# Patient Record
Sex: FEMALE | Race: BLACK OR AFRICAN AMERICAN | NOT HISPANIC OR LATINO | Employment: PART TIME | URBAN - METROPOLITAN AREA
[De-identification: names, ages, dates, MRNs, and addresses within clinical notes are randomized per-mention and may not be internally consistent; named-entity substitution may affect disease eponyms.]

---

## 2017-11-13 ENCOUNTER — CONVERSION ENCOUNTER (OUTPATIENT)
Dept: MAMMOGRAPHY | Facility: HOSPITAL | Age: 62
End: 2017-11-13

## 2018-01-29 ENCOUNTER — OFFICE VISIT CONVERTED (OUTPATIENT)
Dept: ORTHOPEDIC SURGERY | Facility: CLINIC | Age: 63
End: 2018-01-29
Attending: PHYSICIAN ASSISTANT

## 2018-02-02 ENCOUNTER — OFFICE VISIT CONVERTED (OUTPATIENT)
Dept: FAMILY MEDICINE CLINIC | Facility: CLINIC | Age: 63
End: 2018-02-02
Attending: NURSE PRACTITIONER

## 2018-02-02 ENCOUNTER — CONVERSION ENCOUNTER (OUTPATIENT)
Dept: FAMILY MEDICINE CLINIC | Facility: CLINIC | Age: 63
End: 2018-02-02

## 2018-02-28 ENCOUNTER — OFFICE VISIT CONVERTED (OUTPATIENT)
Dept: ORTHOPEDIC SURGERY | Facility: CLINIC | Age: 63
End: 2018-02-28
Attending: PHYSICIAN ASSISTANT

## 2018-03-28 ENCOUNTER — OFFICE VISIT CONVERTED (OUTPATIENT)
Dept: ORTHOPEDIC SURGERY | Facility: CLINIC | Age: 63
End: 2018-03-28
Attending: PHYSICIAN ASSISTANT

## 2018-04-25 ENCOUNTER — OFFICE VISIT CONVERTED (OUTPATIENT)
Dept: ORTHOPEDIC SURGERY | Facility: CLINIC | Age: 63
End: 2018-04-25
Attending: PHYSICIAN ASSISTANT

## 2018-05-16 ENCOUNTER — OFFICE VISIT CONVERTED (OUTPATIENT)
Dept: ORTHOPEDIC SURGERY | Facility: CLINIC | Age: 63
End: 2018-05-16
Attending: ORTHOPAEDIC SURGERY

## 2018-06-25 ENCOUNTER — OFFICE VISIT CONVERTED (OUTPATIENT)
Dept: FAMILY MEDICINE CLINIC | Facility: CLINIC | Age: 63
End: 2018-06-25
Attending: NURSE PRACTITIONER

## 2018-08-29 ENCOUNTER — OFFICE VISIT CONVERTED (OUTPATIENT)
Dept: FAMILY MEDICINE CLINIC | Facility: CLINIC | Age: 63
End: 2018-08-29
Attending: NURSE PRACTITIONER

## 2018-08-29 ENCOUNTER — CONVERSION ENCOUNTER (OUTPATIENT)
Dept: FAMILY MEDICINE CLINIC | Facility: CLINIC | Age: 63
End: 2018-08-29

## 2018-09-12 ENCOUNTER — OFFICE VISIT CONVERTED (OUTPATIENT)
Dept: ORTHOPEDIC SURGERY | Facility: CLINIC | Age: 63
End: 2018-09-12
Attending: PHYSICIAN ASSISTANT

## 2018-11-15 ENCOUNTER — CONVERSION ENCOUNTER (OUTPATIENT)
Dept: MAMMOGRAPHY | Facility: HOSPITAL | Age: 63
End: 2018-11-15

## 2018-12-07 ENCOUNTER — CONVERSION ENCOUNTER (OUTPATIENT)
Dept: MAMMOGRAPHY | Facility: HOSPITAL | Age: 63
End: 2018-12-07

## 2019-01-15 ENCOUNTER — HOSPITAL ENCOUNTER (OUTPATIENT)
Dept: GENERAL RADIOLOGY | Facility: HOSPITAL | Age: 64
Discharge: HOME | End: 2019-01-15
Attending: NURSE PRACTITIONER

## 2019-01-15 ENCOUNTER — OFFICE VISIT CONVERTED (OUTPATIENT)
Dept: FAMILY MEDICINE CLINIC | Facility: CLINIC | Age: 64
End: 2019-01-15
Attending: NURSE PRACTITIONER

## 2019-03-12 ENCOUNTER — HOSPITAL ENCOUNTER (OUTPATIENT)
Dept: PHYSICAL THERAPY | Facility: CLINIC | Age: 64
Setting detail: RECURRING SERIES
Discharge: STILL ADMITTED OR ADMINISTRATIVE | End: 2019-07-05
Attending: NURSE PRACTITIONER

## 2019-03-25 ENCOUNTER — CONVERSION ENCOUNTER (OUTPATIENT)
Dept: ORTHOPEDIC SURGERY | Facility: CLINIC | Age: 64
End: 2019-03-25

## 2019-03-25 ENCOUNTER — OFFICE VISIT CONVERTED (OUTPATIENT)
Dept: ORTHOPEDIC SURGERY | Facility: CLINIC | Age: 64
End: 2019-03-25
Attending: ORTHOPAEDIC SURGERY

## 2019-03-27 ENCOUNTER — OFFICE VISIT CONVERTED (OUTPATIENT)
Dept: FAMILY MEDICINE CLINIC | Facility: CLINIC | Age: 64
End: 2019-03-27
Attending: NURSE PRACTITIONER

## 2019-05-22 ENCOUNTER — HOSPITAL ENCOUNTER (OUTPATIENT)
Dept: ULTRASOUND IMAGING | Facility: HOSPITAL | Age: 64
Discharge: HOME | End: 2019-05-22
Attending: NURSE PRACTITIONER

## 2019-06-04 ENCOUNTER — OFFICE VISIT CONVERTED (OUTPATIENT)
Dept: FAMILY MEDICINE CLINIC | Facility: CLINIC | Age: 64
End: 2019-06-04
Attending: NURSE PRACTITIONER

## 2019-06-04 ENCOUNTER — CONVERSION ENCOUNTER (OUTPATIENT)
Dept: FAMILY MEDICINE CLINIC | Facility: CLINIC | Age: 64
End: 2019-06-04

## 2019-07-03 ENCOUNTER — HOSPITAL ENCOUNTER (OUTPATIENT)
Dept: LAB | Facility: HOSPITAL | Age: 64
Discharge: HOME | End: 2019-07-03
Attending: NURSE PRACTITIONER

## 2019-07-03 ENCOUNTER — OFFICE VISIT CONVERTED (OUTPATIENT)
Dept: FAMILY MEDICINE CLINIC | Facility: CLINIC | Age: 64
End: 2019-07-03
Attending: NURSE PRACTITIONER

## 2019-07-03 LAB
CONV RHEUMATOID FACTOR IGM: 91.9 [IU]/ML (ref 0–14)
ERYTHROCYTE [SEDIMENTATION RATE] IN BLOOD: 14 MM/H (ref 0–30)
URATE SERPL-MCNC: 5.4 MG/DL (ref 2.5–7.5)

## 2019-07-05 LAB — CONV ANTI NUCLEAR ANTIBODY WITH REFLEX: POSITIVE

## 2019-07-08 LAB
CENTROMERE B AB SER-ACNC: <0.2 AI (ref 0–0.9)
CHROMATIN AB: 0.2 AI (ref 0–0.9)
CONV ANTI DOUBLE STRAND DNA  (REFLEX): 1 IU/ML (ref 0–9)
CONV SS-A ANTIBODY (REFLEX): >8 AI (ref 0–0.9)
CONV SS-B ANTIBODY (REFLEX): 0.4 AI (ref 0–0.9)
ENA JO1 AB SER IA-ACNC: <0.2 AI (ref 0–0.9)
ENA RNP AB SER-ACNC: <0.2 AI (ref 0–0.9)
ENA SCL70 AB SER QL IA: <0.2 AI (ref 0–0.9)
ENA SM AB SER-ACNC: <0.2 AI (ref 0–0.9)
Lab: ABNORMAL

## 2019-07-10 ENCOUNTER — HOSPITAL ENCOUNTER (OUTPATIENT)
Dept: PHYSICAL THERAPY | Facility: CLINIC | Age: 64
Setting detail: RECURRING SERIES
Discharge: HOME | End: 2019-07-19
Attending: NURSE PRACTITIONER

## 2019-07-12 ENCOUNTER — OFFICE VISIT CONVERTED (OUTPATIENT)
Dept: FAMILY MEDICINE CLINIC | Facility: CLINIC | Age: 64
End: 2019-07-12
Attending: NURSE PRACTITIONER

## 2019-11-21 ENCOUNTER — HOSPITAL ENCOUNTER (OUTPATIENT)
Dept: MAMMOGRAPHY | Facility: HOSPITAL | Age: 64
Discharge: HOME | End: 2019-11-21
Attending: NURSE PRACTITIONER

## 2019-12-04 ENCOUNTER — CONVERSION ENCOUNTER (OUTPATIENT)
Dept: FAMILY MEDICINE CLINIC | Facility: CLINIC | Age: 64
End: 2019-12-04

## 2019-12-04 ENCOUNTER — OFFICE VISIT CONVERTED (OUTPATIENT)
Dept: FAMILY MEDICINE CLINIC | Facility: CLINIC | Age: 64
End: 2019-12-04
Attending: NURSE PRACTITIONER

## 2020-02-14 ENCOUNTER — HOSPITAL ENCOUNTER (OUTPATIENT)
Dept: LAB | Facility: HOSPITAL | Age: 65
Discharge: HOME | End: 2020-02-14
Attending: NURSE PRACTITIONER

## 2020-02-14 LAB
ALBUMIN SERPL-MCNC: 4.1 G/DL (ref 3.5–5)
ALBUMIN/GLOB SERPL: 1.2 {RATIO} (ref 1.4–2.6)
ALP SERPL-CCNC: 67 U/L (ref 43–160)
ALT SERPL-CCNC: 22 U/L (ref 10–40)
ANION GAP SERPL CALC-SCNC: 18 MMOL/L (ref 8–19)
AST SERPL-CCNC: 17 U/L (ref 15–50)
BASOPHILS # BLD AUTO: 0.02 10*3/UL (ref 0–0.2)
BASOPHILS NFR BLD AUTO: 0.4 % (ref 0–3)
BILIRUB SERPL-MCNC: 0.26 MG/DL (ref 0.2–1.3)
BUN SERPL-MCNC: 12 MG/DL (ref 5–25)
BUN/CREAT SERPL: 19 {RATIO} (ref 6–20)
CALCIUM SERPL-MCNC: 9.3 MG/DL (ref 8.7–10.4)
CHLORIDE SERPL-SCNC: 105 MMOL/L (ref 99–111)
CHOLEST SERPL-MCNC: 147 MG/DL (ref 107–200)
CHOLEST/HDLC SERPL: 2.6 {RATIO} (ref 3–6)
CONV ABS IMM GRAN: 0.01 10*3/UL (ref 0–0.2)
CONV CO2: 24 MMOL/L (ref 22–32)
CONV IMMATURE GRAN: 0.2 % (ref 0–1.8)
CONV TOTAL PROTEIN: 7.4 G/DL (ref 6.3–8.2)
CREAT UR-MCNC: 0.63 MG/DL (ref 0.5–0.9)
DEPRECATED RDW RBC AUTO: 37.7 FL (ref 36.4–46.3)
EOSINOPHIL # BLD AUTO: 0.15 10*3/UL (ref 0–0.7)
EOSINOPHIL # BLD AUTO: 3.2 % (ref 0–7)
ERYTHROCYTE [DISTWIDTH] IN BLOOD BY AUTOMATED COUNT: 13.9 % (ref 11.7–14.4)
GFR SERPLBLD BASED ON 1.73 SQ M-ARVRAT: >60 ML/MIN/{1.73_M2}
GLOBULIN UR ELPH-MCNC: 3.3 G/DL (ref 2–3.5)
GLUCOSE SERPL-MCNC: 83 MG/DL (ref 65–99)
HCT VFR BLD AUTO: 37.2 % (ref 37–47)
HDLC SERPL-MCNC: 56 MG/DL (ref 40–60)
HGB BLD-MCNC: 10.9 G/DL (ref 12–16)
LDLC SERPL CALC-MCNC: 81 MG/DL (ref 70–100)
LYMPHOCYTES # BLD AUTO: 2.09 10*3/UL (ref 1–5)
LYMPHOCYTES NFR BLD AUTO: 44.8 % (ref 20–45)
MCH RBC QN AUTO: 22.4 PG (ref 27–31)
MCHC RBC AUTO-ENTMCNC: 29.3 G/DL (ref 33–37)
MCV RBC AUTO: 76.5 FL (ref 81–99)
MONOCYTES # BLD AUTO: 0.45 10*3/UL (ref 0.2–1.2)
MONOCYTES NFR BLD AUTO: 9.6 % (ref 3–10)
NEUTROPHILS # BLD AUTO: 1.95 10*3/UL (ref 2–8)
NEUTROPHILS NFR BLD AUTO: 41.8 % (ref 30–85)
NRBC CBCN: 0 % (ref 0–0.7)
OSMOLALITY SERPL CALC.SUM OF ELEC: 295 MOSM/KG (ref 273–304)
PLATELET # BLD AUTO: 368 10*3/UL (ref 130–400)
PMV BLD AUTO: 10.9 FL (ref 9.4–12.3)
POTASSIUM SERPL-SCNC: 3.9 MMOL/L (ref 3.5–5.3)
RBC # BLD AUTO: 4.86 10*6/UL (ref 4.2–5.4)
SODIUM SERPL-SCNC: 143 MMOL/L (ref 135–147)
TRIGL SERPL-MCNC: 49 MG/DL (ref 40–150)
TSH SERPL-ACNC: 0.97 M[IU]/L (ref 0.27–4.2)
VLDLC SERPL-MCNC: 10 MG/DL (ref 5–37)
WBC # BLD AUTO: 4.67 10*3/UL (ref 4.8–10.8)

## 2020-03-13 ENCOUNTER — HOSPITAL ENCOUNTER (OUTPATIENT)
Dept: LAB | Facility: HOSPITAL | Age: 65
Discharge: HOME | End: 2020-03-13
Attending: NURSE PRACTITIONER

## 2020-03-13 LAB
BASOPHILS # BLD AUTO: 0.02 10*3/UL (ref 0–0.2)
BASOPHILS NFR BLD AUTO: 0.4 % (ref 0–3)
CONV ABS IMM GRAN: 0.01 10*3/UL (ref 0–0.2)
CONV IMMATURE GRAN: 0.2 % (ref 0–1.8)
DEPRECATED RDW RBC AUTO: 39.9 FL (ref 36.4–46.3)
EOSINOPHIL # BLD AUTO: 0.26 10*3/UL (ref 0–0.7)
EOSINOPHIL # BLD AUTO: 4.8 % (ref 0–7)
ERYTHROCYTE [DISTWIDTH] IN BLOOD BY AUTOMATED COUNT: 14.5 % (ref 11.7–14.4)
HCT VFR BLD AUTO: 37.8 % (ref 37–47)
HGB BLD-MCNC: 10.9 G/DL (ref 12–16)
LYMPHOCYTES # BLD AUTO: 2.22 10*3/UL (ref 1–5)
LYMPHOCYTES NFR BLD AUTO: 40.6 % (ref 20–45)
MCH RBC QN AUTO: 22.3 PG (ref 27–31)
MCHC RBC AUTO-ENTMCNC: 28.8 G/DL (ref 33–37)
MCV RBC AUTO: 77.3 FL (ref 81–99)
MONOCYTES # BLD AUTO: 0.43 10*3/UL (ref 0.2–1.2)
MONOCYTES NFR BLD AUTO: 7.9 % (ref 3–10)
NEUTROPHILS # BLD AUTO: 2.53 10*3/UL (ref 2–8)
NEUTROPHILS NFR BLD AUTO: 46.1 % (ref 30–85)
NRBC CBCN: 0 % (ref 0–0.7)
PLATELET # BLD AUTO: 319 10*3/UL (ref 130–400)
PMV BLD AUTO: 11 FL (ref 9.4–12.3)
RBC # BLD AUTO: 4.89 10*6/UL (ref 4.2–5.4)
WBC # BLD AUTO: 5.47 10*3/UL (ref 4.8–10.8)

## 2020-03-20 ENCOUNTER — OFFICE VISIT CONVERTED (OUTPATIENT)
Dept: FAMILY MEDICINE CLINIC | Facility: CLINIC | Age: 65
End: 2020-03-20
Attending: NURSE PRACTITIONER

## 2020-03-20 ENCOUNTER — CONVERSION ENCOUNTER (OUTPATIENT)
Dept: FAMILY MEDICINE CLINIC | Facility: CLINIC | Age: 65
End: 2020-03-20

## 2020-04-08 ENCOUNTER — OFFICE VISIT CONVERTED (OUTPATIENT)
Dept: ORTHOPEDIC SURGERY | Facility: CLINIC | Age: 65
End: 2020-04-08
Attending: ORTHOPAEDIC SURGERY

## 2020-05-22 ENCOUNTER — CONVERSION ENCOUNTER (OUTPATIENT)
Dept: ORTHOPEDIC SURGERY | Facility: CLINIC | Age: 65
End: 2020-05-22

## 2020-05-22 ENCOUNTER — TELEMEDICINE CONVERTED (OUTPATIENT)
Dept: ORTHOPEDIC SURGERY | Facility: CLINIC | Age: 65
End: 2020-05-22
Attending: PHYSICIAN ASSISTANT

## 2020-06-05 ENCOUNTER — OFFICE VISIT CONVERTED (OUTPATIENT)
Dept: FAMILY MEDICINE CLINIC | Facility: CLINIC | Age: 65
End: 2020-06-05
Attending: NURSE PRACTITIONER

## 2020-06-05 ENCOUNTER — HOSPITAL ENCOUNTER (OUTPATIENT)
Dept: GENERAL RADIOLOGY | Facility: HOSPITAL | Age: 65
Discharge: HOME | End: 2020-06-05
Attending: NURSE PRACTITIONER

## 2020-06-15 ENCOUNTER — OFFICE VISIT CONVERTED (OUTPATIENT)
Dept: FAMILY MEDICINE CLINIC | Facility: CLINIC | Age: 65
End: 2020-06-15
Attending: NURSE PRACTITIONER

## 2020-06-16 ENCOUNTER — HOSPITAL ENCOUNTER (OUTPATIENT)
Dept: MRI IMAGING | Facility: HOSPITAL | Age: 65
Discharge: HOME | End: 2020-06-16
Attending: NURSE PRACTITIONER

## 2020-06-29 ENCOUNTER — OFFICE VISIT CONVERTED (OUTPATIENT)
Dept: ORTHOPEDIC SURGERY | Facility: CLINIC | Age: 65
End: 2020-06-29
Attending: ORTHOPAEDIC SURGERY

## 2020-09-21 ENCOUNTER — OFFICE VISIT CONVERTED (OUTPATIENT)
Dept: ORTHOPEDIC SURGERY | Facility: CLINIC | Age: 65
End: 2020-09-21
Attending: ORTHOPAEDIC SURGERY

## 2020-11-04 ENCOUNTER — OFFICE VISIT CONVERTED (OUTPATIENT)
Dept: FAMILY MEDICINE CLINIC | Facility: CLINIC | Age: 65
End: 2020-11-04
Attending: STUDENT IN AN ORGANIZED HEALTH CARE EDUCATION/TRAINING PROGRAM

## 2020-11-04 ENCOUNTER — OFFICE VISIT CONVERTED (OUTPATIENT)
Dept: ORTHOPEDIC SURGERY | Facility: CLINIC | Age: 65
End: 2020-11-04
Attending: PHYSICIAN ASSISTANT

## 2020-12-03 ENCOUNTER — HOSPITAL ENCOUNTER (OUTPATIENT)
Dept: LAB | Facility: HOSPITAL | Age: 65
Discharge: HOME | End: 2020-12-03
Attending: NURSE PRACTITIONER

## 2020-12-03 ENCOUNTER — CONVERSION ENCOUNTER (OUTPATIENT)
Dept: FAMILY MEDICINE CLINIC | Facility: CLINIC | Age: 65
End: 2020-12-03

## 2020-12-03 ENCOUNTER — OFFICE VISIT CONVERTED (OUTPATIENT)
Dept: FAMILY MEDICINE CLINIC | Facility: CLINIC | Age: 65
End: 2020-12-03
Attending: NURSE PRACTITIONER

## 2020-12-03 LAB
ALBUMIN SERPL-MCNC: 3.9 G/DL (ref 3.5–5)
ALBUMIN/GLOB SERPL: 1.1 {RATIO} (ref 1.4–2.6)
ALP SERPL-CCNC: 72 U/L (ref 43–160)
ALT SERPL-CCNC: 15 U/L (ref 10–40)
ANION GAP SERPL CALC-SCNC: 13 MMOL/L (ref 8–19)
AST SERPL-CCNC: 19 U/L (ref 15–50)
BILIRUB SERPL-MCNC: 0.22 MG/DL (ref 0.2–1.3)
BUN SERPL-MCNC: 12 MG/DL (ref 5–25)
BUN/CREAT SERPL: 17 {RATIO} (ref 6–20)
CALCIUM SERPL-MCNC: 9.5 MG/DL (ref 8.7–10.4)
CHLORIDE SERPL-SCNC: 104 MMOL/L (ref 99–111)
CHOLEST SERPL-MCNC: 162 MG/DL (ref 107–200)
CHOLEST/HDLC SERPL: 2.6 {RATIO} (ref 3–6)
CONV CO2: 26 MMOL/L (ref 22–32)
CONV TOTAL PROTEIN: 7.5 G/DL (ref 6.3–8.2)
CREAT UR-MCNC: 0.71 MG/DL (ref 0.5–0.9)
GFR SERPLBLD BASED ON 1.73 SQ M-ARVRAT: >60 ML/MIN/{1.73_M2}
GLOBULIN UR ELPH-MCNC: 3.6 G/DL (ref 2–3.5)
GLUCOSE SERPL-MCNC: 76 MG/DL (ref 65–99)
HDLC SERPL-MCNC: 63 MG/DL (ref 40–60)
LDLC SERPL CALC-MCNC: 90 MG/DL (ref 70–100)
OSMOLALITY SERPL CALC.SUM OF ELEC: 287 MOSM/KG (ref 273–304)
POTASSIUM SERPL-SCNC: 3.9 MMOL/L (ref 3.5–5.3)
SODIUM SERPL-SCNC: 139 MMOL/L (ref 135–147)
TRIGL SERPL-MCNC: 47 MG/DL (ref 40–150)
VLDLC SERPL-MCNC: 9 MG/DL (ref 5–37)

## 2020-12-15 ENCOUNTER — HOSPITAL ENCOUNTER (OUTPATIENT)
Dept: URGENT CARE | Facility: CLINIC | Age: 65
Discharge: HOME | End: 2020-12-15
Attending: EMERGENCY MEDICINE

## 2020-12-15 ENCOUNTER — HOSPITAL ENCOUNTER (OUTPATIENT)
Dept: MAMMOGRAPHY | Facility: HOSPITAL | Age: 65
Discharge: HOME | End: 2020-12-15
Attending: NURSE PRACTITIONER

## 2021-01-02 ENCOUNTER — HOSPITAL ENCOUNTER (OUTPATIENT)
Dept: PREADMISSION TESTING | Facility: HOSPITAL | Age: 66
Discharge: HOME | End: 2021-01-02
Attending: ORTHOPAEDIC SURGERY

## 2021-01-04 LAB — SARS-COV-2 RNA SPEC QL NAA+PROBE: NOT DETECTED

## 2021-01-07 ENCOUNTER — HOSPITAL ENCOUNTER (OUTPATIENT)
Dept: PERIOP | Facility: HOSPITAL | Age: 66
Setting detail: HOSPITAL OUTPATIENT SURGERY
Discharge: HOME | End: 2021-01-07
Attending: ORTHOPAEDIC SURGERY

## 2021-01-20 ENCOUNTER — OFFICE VISIT CONVERTED (OUTPATIENT)
Dept: ORTHOPEDIC SURGERY | Facility: CLINIC | Age: 66
End: 2021-01-20
Attending: PHYSICIAN ASSISTANT

## 2021-02-22 ENCOUNTER — OFFICE VISIT CONVERTED (OUTPATIENT)
Dept: ORTHOPEDIC SURGERY | Facility: CLINIC | Age: 66
End: 2021-02-22
Attending: PHYSICIAN ASSISTANT

## 2021-03-16 ENCOUNTER — HOSPITAL ENCOUNTER (OUTPATIENT)
Dept: VACCINE CLINIC | Facility: HOSPITAL | Age: 66
Discharge: HOME | End: 2021-03-16
Attending: INTERNAL MEDICINE

## 2021-03-29 ENCOUNTER — OFFICE VISIT CONVERTED (OUTPATIENT)
Dept: ORTHOPEDIC SURGERY | Facility: CLINIC | Age: 66
End: 2021-03-29

## 2021-04-07 ENCOUNTER — HOSPITAL ENCOUNTER (OUTPATIENT)
Dept: VACCINE CLINIC | Facility: HOSPITAL | Age: 66
Discharge: HOME | End: 2021-04-07
Attending: INTERNAL MEDICINE

## 2021-05-10 NOTE — H&P
History and Physical      Patient Name: Meliza Art   Patient ID: [id number]   Sex: Female   YOB: 1955    Primary Care Provider: Bishnu GRIJALVA    Visit Date: June 29, 2020    Provider: Oc Wang MD   Location: [facility]   Location Address: [address] [place] [address]  [id number]   Location Phone: [phone]          Chief Complaint  · Left Shoulder Pain       History Of Present Illness  Meliza Art is a 64 year old /Black female who presents today to [facility]. Patient is following-up for left shoulder injury sustained one month ago after she fell. She states pain at the anterior shoulder, radiates down to her left elbow. Patient states weakness in the left arm. Patient had a MRI.       Past Medical History  Aftercare following right knee joint replacement surgery; Allergic conjunctivitis and rhinitis; Anemia; Arthritis; Hepatitis; History of total knee arthroplasty, right; Limb Swelling; Lipoma of arm; Screening Mammogram; Seasonal allergies; Sinus trouble         Past Surgical History  Artificial Joints/Limbs; Colonoscopy; Excision of right breast mass with needle localization; Hysterectomy; Joint Surgery; Knee repair; Tubal ligation         Medication List  Tylenol Arthritis Pain 650 mg oral tablet extended release; Voltaren 1 % topical gel         Allergy List  SULFA (SULFONAMIDES)         Family Medical History  Stroke; Heart Disease; Cancer, Unspecified; Diabetes, unspecified type; Multiple myeloma; Diabetes; Family history of certain chronic disabling diseases; arthritis; Family history of Arthritis         Social History  Alcohol (Never); Alcohol Use (Current some day); .; lives with other; Recreational Drug Use (Never); Retired.; Tobacco (Never)         Review of Systems  · Constitutional  o Denies  o : fever, chills, weight loss  · Cardiovascular  o Denies  o : chest pain, shortness of  "breath  · Gastrointestinal  o Denies  o : liver disease, heartburn, nausea, blood in stools  · Genitourinary  o Denies  o : painful urination, blood in urine  · Integument  o Denies  o : rash, itching  · Neurologic  o Denies  o : headache, weakness, loss of consciousness  · Musculoskeletal  o Denies  o : painful, swollen joints  · Psychiatric  o Denies  o : drug/alcohol addiction, anxiety, depression      Vitals  Date Time BP Position Site L\R Cuff Size HR RR TEMP (F) WT  HT  BMI kg/m2 BSA m2 O2 Sat        06/29/2020 01:36 PM      85 - R   194lbs 16oz 5'  9.5\" 28.38 2.08 98 %          Physical Examination  · Constitutional  o Appearance  o : well developed, well-nourished, no obvious deformities present  · Head and Face  o Head  o :   § Inspection  § : normocephalic  o Face  o :   § Inspection  § : no facial lesions  · Eyes  o Conjunctivae  o : conjunctivae normal  o Sclerae  o : sclerae white  · Ears, Nose, Mouth and Throat  o Ears  o :   § External Ears  § : appearance within normal limits  § Hearing  § : intact  o Nose  o :   § External Nose  § : appearance normal  · Neck  o Inspection/Palpation  o : normal appearance  o Range of Motion  o : full range of motion  · Respiratory  o Respiratory Effort  o : breathing unlabored  o Inspection of Chest  o : normal appearance  o Auscultation of Lungs  o : no audible wheezing or rales  · Cardiovascular  o Heart  o : regular rate  · Gastrointestinal  o Abdominal Examination  o : soft and non-tender  · Skin and Subcutaneous Tissue  o General Inspection  o : intact, no rashes  · Psychiatric  o General  o : Alert and oriented x3  o Judgement and Insight  o : judgment and insight intact  o Mood and Affect  o : mood normal, affect appropriate  · Left Shoulder  o Inspection  o : No skin discoloration, atrophy or swelling. Palpable tenderness over AC joint. Palpable tenderness over subacromial bursa. Palpable tenderness over greater tuberosity. Forward flexion to 160. Abduction " 160. Internal rotation to L4. Positive empty can. Positive Neers. positive Urrutia. Pain with cross body adduction. Neurovascularly and sensation grossly intact.   · Injection Note/Aspiration Note  o Site  o : left shoulder   o Procedure  o : Procedure: After educating the patient, patient gave consent for procedure. After using Chloraprep, the joint space was injected. The patient tolerated the procedure well.   o Medication  o : 80 mg of DepoMedrol with 9cc of 1% Lidocaine  · Imaging  o Imaging  o : MRI left shoulder: 1) Mildly prominent left axillary lymph nodes. Correlate with physical examination. Consider chest CT to further evaluate. 2) Moderate supraspinatus and mild subscapularis tendinopathy. 3) Tear of the distal supraspinatus tendon which is favored to extend full-thickness. No appreciate tendon retraction. 4) Moderate glenohumeral joint effusion.           Assessment  · Left shoulder pain, unspecified chronicity     719.41/M25.512  · Left rotator cuff tear     840.4/M75.100      Plan  · Orders  o Depo-Medrol injection 80mg () - - 06/29/2020   Lot [id number] Exp 06 2021 [employer] Administered by LUCY Wang MD  o Shoulder Intra-articular Injection without US Guidance [facility] ([id number]) - - 06/29/2020   Lot [id number] 07 01 2021 [employer] Administered by LUCY Wang MD  · Medications  o Medications have been Reconciled  o Transition of Care or Provider Policy  · Instructions  o Reviewed the patient's Past Medical, Social, and Family history as well as the ROS at today's visit, no changes.  o Call or return if worsening symptoms.  o Discussed surgery.  o Risks/benefits discussed with patient including, but not limited to: infection, bleeding, neurovascular damage, malunion, nonunion, aesthetic deformity, need for further surgery, and death.  o Surgery pamphlet given.  o This note is transcribed by Soumya abdi/zaida  o Steroid injection. Schedule surgery. Risks and benefits of left arthroscopic surgery have  been discussed. Patient understands and wishes to proceed.   o Electronically Identified Patient Education Materials Provided Electronically            Electronically Signed by: Soumya Dick, -Author on July 1, 2020 03:39:56 PM  Electronically Co-signed by: Susan Rose PA-C -Reviewer on July 1, 2020 03:45:49 PM  Electronically Co-signed by: Oc Wang MD -Reviewer on July 6, 2020 02:04:21 PM

## 2021-05-10 NOTE — H&P
History and Physical      Patient Name: Meliza Art   Patient ID: [id number]   Sex: Female   YOB: 1955    Primary Care Provider: Bishnu GRIJALVA    Visit Date: April 8, 2020    Provider: Oc Wang MD   Location: [facility]   Location Address: [address] [place] [address]  [id number]   Location Phone: [phone]          Chief Complaint  · Left Knee Pain      History Of Present Illness  Meliza Art is a 64 year old /Black female who presents today to [facility].      She's here for evaluation of left knee pain that started recently. Patient complains of left knee pain and swelling. Left knee x-rays 3/16/2020 revealed no acute osseous abnormality. A joint effusion is present. 2) Moderate tricompartmental osteoarthritic degenerative changes of the left knee. Patient has a history of right total knee arthroplasty on 1/16/18.       Past Medical History  Aftercare following right knee joint replacement surgery; Allergic conjunctivitis and rhinitis; Anemia; Arthritis; Hepatitis; History of total knee arthroplasty, right; Limb Swelling; Screening Mammogram; Seasonal allergies; Sinus trouble         Past Surgical History  Artificial Joints/Limbs; Colonoscopy; Excision of right breast mass with needle localization; Hysterectomy; Joint Surgery; Knee repair; Tubal ligation         Medication List  Adacel(Tdap Adolesn/Adult)(PF) 2 Lf-(2.5-5-3-5 mcg)-5Lf/0.5 mL intramuscular syringe; amoxicillin 500 mg oral capsule; diclofenac sodium 1 % topical gel; diclofenac sodium 50 mg oral tablet,delayed release (DR/EC); hydrocodone-acetaminophen 5-325 mg oral tablet; ibuprofen 800 mg oral tablet; prednisone 20 mg oral tablet; valacyclovir 1 gram oral tablet; Voltaren 1 % topical gel         Allergy List  SULFA (SULFONAMIDES)         Family Medical History  Stroke; Heart Disease; Cancer, Unspecified; Diabetes, unspecified type; Multiple myeloma;  "Diabetes; Family history of certain chronic disabling diseases; arthritis; Family history of Arthritis         Social History  Alcohol (Never); Alcohol Use (Current some day); .; lives with other; Recreational Drug Use (Never); Retired.; Tobacco (Never)         Immunizations  Name Date Admin   Influenza Refused         Review of Systems  · Constitutional  o Denies  o : fever, chills, weight loss  · Cardiovascular  o Denies  o : chest pain, shortness of breath  · Gastrointestinal  o Denies  o : liver disease, heartburn, nausea, blood in stools  · Genitourinary  o Denies  o : painful urination, blood in urine  · Integument  o Denies  o : rash, itching  · Neurologic  o Denies  o : headache, weakness, loss of consciousness  · Musculoskeletal  o Denies  o : painful, swollen joints  · Psychiatric  o Denies  o : drug/alcohol addiction, anxiety, depression      Vitals  Date Time BP Position Site L\R Cuff Size HR RR TEMP (F) WT  HT  BMI kg/m2 BSA m2 O2 Sat        04/08/2020 10:14 AM         192lbs 0oz 5'  10\" 27.55 2.07           Physical Examination  · Constitutional  o Appearance  o : well developed, well-nourished, no obvious deformities present  · Head and Face  o Head  o :   § Inspection  § : normocephalic  o Face  o :   § Inspection  § : no facial lesions  · Eyes  o Conjunctivae  o : conjunctivae normal  o Sclerae  o : sclerae white  · Ears, Nose, Mouth and Throat  o Ears  o :   § External Ears  § : appearance within normal limits  § Hearing  § : intact  o Nose  o :   § External Nose  § : appearance normal  · Neck  o Inspection/Palpation  o : normal appearance  o Range of Motion  o : full range of motion  · Respiratory  o Respiratory Effort  o : breathing unlabored  o Inspection of Chest  o : normal appearance  o Auscultation of Lungs  o : no audible wheezing or rales  · Cardiovascular  o Heart  o : regular rate  · Gastrointestinal  o Abdominal Examination  o : soft and non-tender  · Skin and Subcutaneous " Tissue  o General Inspection  o : intact, no rashes  · Psychiatric  o General  o : Alert and oriented x3  o Judgement and Insight  o : judgment and insight intact  o Mood and Affect  o : mood normal, affect appropriate  · Left Knee  o Inspection  o : Full weight-bearing. Positive effusion. No skin discoloration, or atrophy. Near-full range of motion with pain. Medial and lateral joint line tenderness. Positive crepitus. Good strength of quadriceps, hamstrings, dorsiflexors, and plantar flexors. Neurovascularly intact. Sensation grossly intact. Calf supple; non-tender.   · Injection Note/Aspiration Note  o Site  o : left knee   o Procedure  o : Procedure: After educating the patient, patient gave consent for procedure. After using Chloraprep, the joint space was injected. The patient tolerated the procedure well.   o Medication  o : 80 mg of DepoMedrol with 9cc of 1% Lidocaine          Assessment  · Primary osteoarthritis of left knee     715.16/M17.12  · Left knee pain, unspecified chronicity     719.46/M25.562      Plan  · Orders  o Depo-Medrol injection 80mg () - - 04/08/2020   Lot [id number] Exp 05 2021 [employer] Administered by LUCY Wang MD  o Knee Intra-articular Injection without US Guidance [facility] ([id number]) - - 04/08/2020   Lot [id number] 07 01 2021 [employer] Administered by LUCY Wang MD  · Medications  o Medications have been Reconciled  o Transition of Care or Provider Policy  · Instructions  o Reviewed the patient's Past Medical, Social, and Family history as well as the ROS at today's visit, no changes.  o Call or return if worsening symptoms.  o Discussed surgery.  o Risks/benefits discussed with patient including, but not limited to: infection, bleeding, neurovascular damage, malunion, nonunion, aesthetic deformity, need for further surgery, and death.  o Reviewed Xrays.  o The above service was scribed by Katiana Mac on my behalf and I attest to the accuracy of the note. zaida  o Discussed  conservative management versus surgical intervention. Patient is a candidate for left total knee arthroplasty. The plan is left knee aspiration and steroid injection. Follow-up in 4-6 weeks if failing to improve.            Electronically Signed by: Negrita Mac - , Other -Author on April 9, 2020 09:35:26 AM  Electronically Co-signed by: Oc Wang MD -Reviewer on April 10, 2020 05:04:34 PM

## 2021-05-12 ENCOUNTER — OFFICE VISIT CONVERTED (OUTPATIENT)
Dept: ORTHOPEDIC SURGERY | Facility: CLINIC | Age: 66
End: 2021-05-12
Attending: PHYSICIAN ASSISTANT

## 2021-05-13 NOTE — PROGRESS NOTES
Progress Note      Patient Name: Meliza Art   Patient ID: [id number]   Sex: Female   YOB: 1955    Primary Care Provider: Bishnu GRIJALVA    Visit Date: December 3, 2020    Provider: VALENTINE Cast   Location: [facility]   Location Address: [address] [place] [address], [address] [place] [address]  [id number]   Location Phone: [phone]          Chief Complaint     The patient is here for a six month f/u of joint pain.       History Of Present Illness  Meliza Art is a 65 year old /Black female who presents for evaluation and treatment of:      arthralgia:  doing well on medication.       Past Medical History  Disease Name Date Onset Notes   Aftercare following right knee joint replacement surgery 01/29/2018 --    Allergic conjunctivitis and rhinitis --  --    Anemia --  --    Arthritis --  --    Hepatitis --  --    History of total knee arthroplasty, right 05/16/2018 --    Limb Swelling --  --    Lipoma of arm 06/15/2020 --    Screening Mammogram 11/21.2019 --    Seasonal allergies --  --    Sinus trouble --  --          Past Surgical History  Procedure Name Date Notes   Artificial Joints/Limbs --  --    Colonoscopy 06/2017 --    Excision of right breast mass with needle localization 6/6/1997 --    Hysterectomy 2006 --    Joint Surgery --  --    Knee repair 1989/1991/2010 rt knee-89'/10' lft knee-91'   Tubal ligation 09/14/1993 --          Medication List  Name Date Started Instructions   Tylenol Arthritis Pain 650 mg oral tablet extended release  take 2 tablets by oral route every 8 hours   Voltaren 1 % topical gel 09/21/2020 APPLY 4 GRAMS TOPICALLY TO THE AFFECTED AREA(S) FOUR TIMES DAILY AS DIRECTED         Allergy List  Allergen Name Date Reaction Notes   SULFA (SULFONAMIDES) --  --  --          Family Medical History  Disease Name Relative/Age Notes   Stroke Father/   Father   Heart Disease Father/   Father  Father  Mother   "Mother  grandparents/not specified   Cancer, Unspecified Mother/   Mother  Mother  Mother  Mother; Brother   Diabetes, unspecified type Brother/  Father/  Sister/   Father; Sister; Brother  Father; Sister; Brother  Father; Brother  Father; Sister; Brother   Multiple myeloma Brother/23   --    Diabetes Brother/  Father/  Sister/   --    Family history of certain chronic disabling diseases; arthritis Mother/   Mother   Family history of Arthritis Brother/  Mother/   Mother; Brother  Mother         Social History  Finding Status Start/Stop Quantity Notes   Alcohol Never --/-- --  01/29/2018 -    Alcohol Use Never --/-- --  does not drink   . --  --/-- --  --    lives with other --  --/-- --  --    Recreational Drug Use Never --/-- --  no   Retired. --  --/-- --  --    Tobacco Never --/-- --  never smoker   Working --  --/-- --  --          Immunizations  NameDate Admin Mfg Trade Name Lot Number Route Inj VIS Given VIS Publication   InfluenzaRefused 12/03/2020 NE Not Entered  NE NE     Comments:          Review of Systems  · Constitutional  o Denies  o : fever, fatigue, weight loss, weight gain  · Cardiovascular  o Denies  o : lower extremity edema, claudication, chest pressure, palpitations  · Respiratory  o Denies  o : shortness of breath, wheezing, cough, hemoptysis, dyspnea on exertion  · Gastrointestinal  o Denies  o : nausea, vomiting, diarrhea, constipation, abdominal pain      Vitals  Date Time BP Position Site L\R Cuff Size HR RR TEMP (F) WT  HT  BMI kg/m2 BSA m2 O2 Sat FR L/min FiO2        12/03/2020 08:10 /82 Sitting    91 - R 16 97 192lbs 0oz 5'  11\" 26.78 2.09 98 %  21%          Physical Examination  · Constitutional  o Appearance  o : well-nourished, well developed, alert, in no acute distress  · Eyes  o Conjunctivae  o : conjunctivae normal  o Sclerae  o : sclerae white  o Pupils and Irises  o : pupils equal, round, and reactive to light and accommodation " bilaterally  o Corneas  o : tear film normal, no lesions present  o Eyelids/Ocular Adnexae  o : eyelid appearance normal, no exudates present, eye moisture level normal  · Respiratory  o Respiratory Effort  o : breathing unlabored  o Inspection of Chest  o : normal appearance, no retractions  o Auscultation of Lungs  o : normal breath sounds throughout  · Cardiovascular  o Heart  o :   § Auscultation of Heart  § : regular rate and rhythm without murmur, PMI normal  o Peripheral Vascular System  o :   § Extremities  § : no cyanosis, clubbing or edema; less than 2 second refill noted  · Musculoskeletal  o General  o : No joint swelling or deformity noted. Muscle tone, strength and development grossly normal.  · Skin and Subcutaneous Tissue  o General Inspection  o : no rashes or lesions present, no areas of discoloration  · Neurologic  o Mental Status Examination  o : judgement, insight intact, modd and affect appropriate  o Motor Examination  o : strength grossly intact in all four extremities  o Gait and Station  o : normal gait, able to stand without difficulty          Assessment  · Encounter for screening for cardiovascular disorders     V81.2/Z13.6  · Osteoarthritis     715.90/M19.90  · Other long term (current) drug therapy     V58.69/Z79.899      Plan  · Orders  o Lipid Panel [facility] ([id number]) - V81.2/Z13.6 - 12/03/2020  o CMP [facility] ([id number]) - V58.69/Z79.899 - 12/03/2020  o ACO-39: Current medications updated and reviewed (, 1159F) - - 12/03/2020  o ACO-14: Influenza immunization was not administered for reasons documented [facility] () - - 12/03/2020  · Medications  o Medications have been Reconciled  o Transition of Care or Provider Policy  · Instructions  o Patient was educated/instructed on their diagnosis, treatment and medications prior to discharge from the clinic today.  o Minutes spent with patient including greater than 50% in Education/Counseling/Care Coordination.  o Time spent with the patient was  minutes, more than 50% face to face.  o Flu vaccine declined.  · Disposition  o F/U in 1 year            Electronically Signed by: VALENTINE Cast -Author on December 3, 2020 08:47:28 AM

## 2021-05-13 NOTE — PROGRESS NOTES
Progress Note      Patient Name: Meliza Art   Patient ID: [id number]   Sex: Female   YOB: 1955    Primary Care Provider: Bishnu GRIJALVA    Visit Date: Viviana 15, 2020    Provider: VALENTINE Cast   Location: [facility]   Location Address: [address] [place] [address], [address] [place] [address]  [id number]   Location Phone: [phone]          Chief Complaint     The patient is here for a six month f/u of arthralgia.  She still has shoulder pain, but MRI is scheduled.       History Of Present Illness  Meliza Art is a 64 year old /Black female who presents for evaluation and treatment of:      6 month follow up:    arthralgia- doing ok.  States Voltaren gel works sometimes and she is also taking Tylenol arthritis as needed.    She is still having left shoulder pain but states that she reinjured it 2 weeks ago while at work.  She is having an MRI scheduled.  She describes the pain as constant and occasionally sharp.    She also states that she has two lumps on her right arm that should would like to have looked it.  They have been there for awhile and do not hurt.       Past Medical History  Disease Name Date Onset Notes   Aftercare following right knee joint replacement surgery 01/29/2018 --    Allergic conjunctivitis and rhinitis --  --    Anemia --  --    Arthritis --  --    Hepatitis --  --    History of total knee arthroplasty, right 05/16/2018 --    Limb Swelling --  --    Lipoma of arm 06/15/2020 --    Screening Mammogram 11/21.2019 --    Seasonal allergies --  --    Sinus trouble --  --          Past Surgical History  Procedure Name Date Notes   Artificial Joints/Limbs --  --    Colonoscopy 06/2017 --    Excision of right breast mass with needle localization 6/6/1997 --    Hysterectomy 2006 --    Joint Surgery --  --    Knee repair 1989/1991/2010 rt knee-89'/10' lft knee-91'   Tubal ligation 09/14/1993 --          Medication List  Name Date  Started Instructions   Tylenol Arthritis Pain 650 mg oral tablet extended release  take 2 tablets by oral route every 8 hours   Voltaren 1 % topical gel 12/04/2019 apply 4 gram to the affected area(s) by topical route 4 times per day for 90 days         Allergy List  Allergen Name Date Reaction Notes   SULFA (SULFONAMIDES) --  --  --          Family Medical History  Disease Name Relative/Age Notes   Stroke Father/   Father   Heart Disease Father/   Father  Mother  Mother  grandparents/not specified   Cancer, Unspecified Mother/   Mother  Mother  Mother; Brother   Diabetes, unspecified type Brother/  Father/  Sister/   Father; Sister; Brother  Father; Brother  Father; Sister; Brother   Multiple myeloma Brother/23   --    Diabetes Brother/  Father/  Sister/   --    Family history of certain chronic disabling diseases; arthritis Mother/   Mother   Family history of Arthritis Mother/   Mother         Social History  Finding Status Start/Stop Quantity Notes   Alcohol Never --/-- --  01/29/2018 -    Alcohol Use Current some day --/-- --  rarely drinks   . --  --/-- --  --    lives with other --  --/-- --  --    Recreational Drug Use Never --/-- --  no   Retired. --  --/-- --  --    Tobacco Never --/-- --  never smoker         Immunizations  NameDate Admin Mfg Trade Name Lot Number Route Inj VIS Given VIS Publication   InfluenzaRefused 07/03/2019 NE Not Entered  NE NE     Comments:          Review of Systems  · Constitutional  o Denies  o : fever, fatigue, weight loss, weight gain  · Cardiovascular  o Denies  o : lower extremity edema, claudication, chest pressure, palpitations  · Respiratory  o Denies  o : shortness of breath, wheezing, cough, hemoptysis, dyspnea on exertion  · Gastrointestinal  o Denies  o : nausea, vomiting, diarrhea, constipation, abdominal pain  · Musculoskeletal  o Admits  o : joint pain, shoulder pain      Vitals  Date Time BP Position Site L\R Cuff Size HR RR TEMP (F) WT  HT  BMI  "kg/m2 BSA m2 O2 Sat        06/15/2020 07:51 /72 Sitting    83 - R 16 97.1 189lbs 0oz 5'  10\" 27.12 2.06 97 %          Physical Examination  · Constitutional  o Appearance  o : well-nourished, well developed, alert, in no acute distress  · Eyes  o Conjunctivae  o : conjunctivae normal  o Sclerae  o : sclerae white  o Pupils and Irises  o : pupils equal, round, and reactive to light and accommodation bilaterally  o Corneas  o : tear film normal, no lesions present  o Eyelids/Ocular Adnexae  o : eyelid appearance normal, no exudates present, eye moisture level normal  · Respiratory  o Respiratory Effort  o : breathing unlabored, no accessory muscle use  o Inspection of Chest  o : normal appearance, no retractions  o Auscultation of Lungs  o : normal breath sounds throughout  · Cardiovascular  o Heart  o :   § Auscultation of Heart  § : regular rate and rhythm without murmur, PMI normal  o Peripheral Vascular System  o :   § Carotid Arteries  § : normal pulses bilaterally, no bruits present  § Extremities  § : no cyanosis, clubbing or edema; less than 2 second refill noted  · Musculoskeletal  o General  o : decreased ROM of left shoulder with flexion, extension, and abduction  · Skin and Subcutaneous Tissue  o General Inspection  o : nontender lump on right shoulder and forearm, no rashes or lesions present, no areas of discoloration  · Neurologic  o Mental Status Examination  o : judgement, insight intact, modd and affect appropriate  o Motor Examination  o : strength grossly intact in all four extremities  o Gait and Station  o : normal gait, able to stand without difficulty              Assessment  · Arthralgia     719.40/M25.50  · Shoulder pain, left     719.41/M25.512  · Lipoma of arm     214.8/D17.20      Plan  · Orders  o ACO-39: Current medications updated and reviewed () - - 06/15/2020  o ACO-14: Influenza immunization was not administered for reasons documented () - - " 06/15/2020  · Medications  o Medications have been Reconciled  o Transition of Care or Provider Policy  · Instructions  o Patient was educated/instructed on their diagnosis, treatment and medications prior to discharge from the clinic today.  o Minutes spent with patient including greater than 50% in Education/Counseling/Care Coordination.  o Time spent with the patient was minutes, more than 50% face to face.  · Disposition  o Return in 6 months            Electronically Signed by: VALENTINE Cast -Author on Vivaina 15, 2020 08:37:26 AM

## 2021-05-13 NOTE — PROGRESS NOTES
Progress Note      Patient Name: Meliza Art   Patient ID: [id number]   Sex: Female   YOB: 1955    Primary Care Provider: Bishnu GRIJALVA    Visit Date: November 4, 2020    Provider: Orin Wynn MD   Location: [facility]   Location Address: [address] [place] [address], [address]  [place], [place]  [id number]   Location Phone: [phone]          Chief Complaint     Pt here today for right hand pain X 3 days.       History Of Present Illness  Meliza Art is a 65 year old /Black female who presents for evaluation and treatment of:      65 years old female with past medical history of arthritis comes to the clinic today complaining of right wrist pain for 3 days.    Patient presents to the clinic complaining of right wrist pain and mild dorsal wrist swelling for 3 days.  Patient reports using Tylenol at home which helped with pain.  Patient reports swelling has improved significantly today.  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  Patient reports no skin changes or any weakness/numbness/tingling.  Pain is just located on the wrist area, sharp pain, intermittent, worsening with work.  Patient reports she has been using wrist brace which is helping her a lot with pain and swelling.       Past Medical History  Disease Name Date Onset Notes   Aftercare following right knee joint replacement surgery 01/29/2018 --    Allergic conjunctivitis and rhinitis --  --    Anemia --  --    Arthritis --  --    Hepatitis --  --    History of total knee arthroplasty, right 05/16/2018 --    Limb Swelling --  --    Lipoma of arm 06/15/2020 --    Screening Mammogram 11/21.2019 --    Seasonal allergies --  --    Sinus trouble --  --          Past Surgical History  Procedure Name Date Notes   Artificial Joints/Limbs --  --    Colonoscopy 06/2017 --    Excision of right breast mass with needle  localization 6/6/1997 --    Hysterectomy 2006 --    Joint Surgery --  --    Knee repair 1989/1991/2010 rt knee-89'/10' lft knee-91'   Tubal ligation 09/14/1993 --          Medication List  Name Date Started Instructions   Tylenol Arthritis Pain 650 mg oral tablet extended release  take 2 tablets by oral route every 8 hours   Voltaren 1 % topical gel 09/21/2020 APPLY 4 GRAMS TOPICALLY TO THE AFFECTED AREA(S) FOUR TIMES DAILY AS DIRECTED         Allergy List  Allergen Name Date Reaction Notes   SULFA (SULFONAMIDES) --  --  --          Family Medical History  Disease Name Relative/Age Notes   Stroke Father/   Father   Heart Disease Father/   Father  Father  Mother  Mother  grandparents/not specified   Cancer, Unspecified Mother/   Mother  Mother  Mother  Mother; Brother   Diabetes, unspecified type Brother/  Father/  Sister/   Father; Sister; Brother  Father; Sister; Brother  Father; Brother  Father; Sister; Brother   Multiple myeloma Brother/23   --    Diabetes Brother/  Father/  Sister/   --    Family history of certain chronic disabling diseases; arthritis Mother/   Mother   Family history of Arthritis Brother/  Mother/   Mother; Brother  Mother         Social History  Finding Status Start/Stop Quantity Notes   Alcohol Never --/-- --  01/29/2018 -    Alcohol Use Never --/-- --  does not drink   . --  --/-- --  --    lives with other --  --/-- --  --    Recreational Drug Use Never --/-- --  no   Retired. --  --/-- --  --    Tobacco Never --/-- --  never smoker   Working --  --/-- --  --          Immunizations  NameDate Admin Mfg Trade Name Lot Number Route Inj VIS Given VIS Publication   InfluenzaRefused 07/03/2019 NE Not Entered  NE NE     Comments:          Review of Systems  · Constitutional  o Denies  o : fatigue, fever  · Eyes  o Denies  o : discharge from eye, redness  · HENT  o Denies  o : headaches, congestion  · Cardiovascular  o Denies  o : chest pain,  "palpitations  · Respiratory  o Denies  o : shortness of breath, wheezing, cough  · Gastrointestinal  o Denies  o : vomiting, diarrhea, constipation  · Genitourinary  o Denies  o : dysuria, hematuria  · Integument  o Denies  o : rash, new skin lesions  · Neurologic  o Denies  o : altered mental status, seizures  · Musculoskeletal  o Admits  o : wrist pain, right hand pain/swelling.  o Denies  o : weakness, joint swelling  · Psychiatric  o Denies  o : anxiety, depression  · Heme-Lymph  o Denies  o : lymph node enlargement, tenderness      Vitals  Date Time BP Position Site L\R Cuff Size HR RR TEMP (F) WT  HT  BMI kg/m2 BSA m2 O2 Sat FR L/min FiO2 HC       11/04/2020 07:38 AM      82 - R   188lbs 0oz 5'  11\" 26.22 2.07 97 %      11/04/2020 01:26 /79 Sitting    99 - R 16 97.4 187lbs 0oz 5'  10\" 26.83 2.05 98 %  21%          Physical Examination  · Constitutional  o Appearance  o : alert, in no acute distress, well developed, well-nourished  · Head and Face  o Head  o : normocephalic, atraumatic, non tender, no palpable masses or nodules.  o Face  o : no facial lesions  · Eyes  o Vision  o : Acuity: grossly normal at distance, Conjuntivae: Normal, Sclerae white, Pupils: PERRL, Cornea: Clear, no lesions bilateral  · Neck  o Inspection/Palpation  o : Supple, no masses or tenderness, no deformities, Trachea: Midline, ROM: with in normal limits, no neck stiffness  o Thyroid  o : no thyomegaly, no palpabale masses   · Respiratory  o Auscultation of Lungs  o : normal breath sounds throughout  · Cardiovascular  o Heart  o : Regular rate and rhythm, Normal S1,S2 , No cardiac murmers, No S3 or S4 gallop or rubs  · Gastrointestinal  o Abdominal Examination  o : abdomen soft, nontender, non distended, no rigidity, gaurding, rebound tenderness, no ventral or inguinal hernias present  o Liver and spleen  o : no hepatomegaly present, liver nontender to palpation, spleen not palpable  · Musculoskeletal  o General  o :   § General " Musculoskeletal  § : Right wrist; mild dorsal swelling very minimal, nontender, motor and sensory intact, no erythema or any limitation in her wrist movement noted.  · Skin and Subcutaneous Tissue  o General Inspection  o : no rashes , or lesions present, normal skin color, warm and dry  o Digits and Nails  o : no clubbing, cyanosis, deformities or edema present, normal appearing nails  · Neurologic  o Mental Status Examination  o : alert and oriented to time, place, and person., Cranial Nerves: grossly intact,   · Psychiatric  o Mood and Affect  o : normal mood and affect          Assessment  · Wrist pain, acute, right     719.43/M25.531  --Likely due to arthritis/inflammation versus work-related  --RICE with NSAIDs discussed   --Continue with wrist brace while working  --Return to the office if no improvement in 4 to 5 days or worsening      Plan  · Orders  o ACO-39: Current medications updated and reviewed (1159F, ) - - 11/04/2020  o ACO-14: Influenza immunization was not administered for reasons documented [facility] () - - 11/04/2020  · Medications  o Naprosyn 500 mg oral tablet   SIG: take 1 tablet by oral route every 12 hours as needed for 14 days   DISP: (28) Tablet with 0 refills  Prescribed on 11/04/2020     · Instructions  o Patient was educated/instructed on their diagnosis, treatment and medications prior to discharge from the clinic today.  o Patient instructed to seek medical attention urgently for new or worsening symptoms.  o Call the office with any concerns or questions.  o Bring all medicines with their bottles to each office visit.  o Minutes spent with patient including greater than 50% in Education/Counseling/Care Coordination.  o Time spent with the patient was minutes, more than 50% face to face.  o Discussed Covid-19 precautions including, but not limited to, social distancing, avoid touching your face, and hand washing.   · Disposition  o Call or Return if symptoms worsen or  persist.  o Follow Up in 2 weeks.            Electronically Signed by: Orin Wynn MD -Author on November 4, 2020 01:45:54 PM

## 2021-05-13 NOTE — PROGRESS NOTES
Progress Note      Patient Name: Meliza Art   Patient ID: [id number]   Sex: Female   YOB: 1955    Primary Care Provider: Bishnu GRIJALVA    Visit Date: June 5, 2020    Provider: VALENTINE Cast   Location: [facility]   Location Address: [address] [place] [address], [address] [place] [address]  [id number]   Location Phone: [phone]          Chief Complaint     Patient fell on 6/3/20   She hurt her left shoulder and right hand when catching herself when she fell. She has been taking NSAIDS.       History Of Present Illness  Meliza Art is a 64 year old /Black female who presents for evaluation and treatment of:      Patient here after a fall.  She missed the last step of a ladder.  And caught herself and has left shoulder pain and right wrist pain.  Ibuprofen has helped a little bit.  Has arthritis.  And already has had some issues with her left shoulder.  Tenderness on her right side of her neck as well.  But it is point tender.       Past Medical History  Disease Name Date Onset Notes   Aftercare following right knee joint replacement surgery 01/29/2018 --    Allergic conjunctivitis and rhinitis --  --    Anemia --  --    Arthritis --  --    Hepatitis --  --    History of total knee arthroplasty, right 05/16/2018 --    Limb Swelling --  --    Screening Mammogram 11/21.2019 --    Seasonal allergies --  --    Sinus trouble --  --          Past Surgical History  Procedure Name Date Notes   Artificial Joints/Limbs --  --    Colonoscopy 06/2017 --    Excision of right breast mass with needle localization 6/6/1997 --    Hysterectomy 2006 --    Joint Surgery --  --    Knee repair 1989/1991/2010 rt knee-89'/10' lft knee-91'   Tubal ligation 09/14/1993 --          Medication List  Name Date Started Instructions   ibuprofen 800 mg oral tablet 03/20/2020 TAKE ONE TABLET BY MOUTH THREE TIMES DAILY WITH FOOD.   Voltaren 1 % topical gel 12/04/2019 apply 4 gram to  the affected area(s) by topical route 4 times per day for 90 days         Allergy List  Allergen Name Date Reaction Notes   SULFA (SULFONAMIDES) --  --  --          Family Medical History  Disease Name Relative/Age Notes   Stroke Father/   Father   Heart Disease Father/   Father  Mother  Mother  grandparents/not specified   Cancer, Unspecified Mother/   Mother  Mother  Mother; Brother   Diabetes, unspecified type Brother/  Father/  Sister/   Father; Sister; Brother  Father; Brother  Father; Sister; Brother   Multiple myeloma Brother/23   --    Diabetes Brother/  Father/  Sister/   --    Family history of certain chronic disabling diseases; arthritis Mother/   Mother   Family history of Arthritis Mother/   Mother         Social History  Finding Status Start/Stop Quantity Notes   Alcohol Never --/-- --  01/29/2018 -    Alcohol Use Current some day --/-- --  rarely drinks   . --  --/-- --  --    lives with other --  --/-- --  --    Recreational Drug Use Never --/-- --  no   Retired. --  --/-- --  --    Tobacco Never --/-- --  never smoker         Immunizations  NameDate Admin Mfg Trade Name Lot Number Route Inj VIS Given VIS Publication   InfluenzaRefused 07/03/2019 NE Not Entered  NE NE     Comments:          Review of Systems  · Constitutional  o Denies  o : fatigue, night sweats  · Eyes  o Denies  o : double vision, blurred vision  · HENT  o Denies  o : vertigo, recent head injury  · Breasts  o Denies  o : abnormal changes in breast size, additional breast symptoms except as noted in the HPI  · Cardiovascular  o Denies  o : chest pain, irregular heart beats  · Respiratory  o Denies  o : shortness of breath, productive cough  · Gastrointestinal  o Denies  o : nausea, vomiting  · Genitourinary  o Denies  o : dysuria, urinary retention  · Integument  o Denies  o : hair growth change, new skin lesions  · Neurologic  o Denies  o : altered mental status, seizures  · Musculoskeletal  o Admits  o : shoulder  "pain, wrist pain, hand pain  o Denies  o : joint swelling, limitation of motion  · Endocrine  o Denies  o : cold intolerance, heat intolerance  · Heme-Lymph  o Denies  o : petechiae, lymph node enlargement or tenderness  · Allergic-Immunologic  o Denies  o : frequent illnesses      Vitals  Date Time BP Position Site L\R Cuff Size HR RR TEMP (F) WT  HT  BMI kg/m2 BSA m2 O2 Sat        06/05/2020 01:40 /72 Sitting    90 - R 18 97.5 193lbs 0oz 5'  10\" 27.69 2.08 99 %          Physical Examination  · Constitutional  o Appearance  o : well-nourished, in no acute distress  · Eyes  o Conjunctivae  o : conjunctivae normal  o Sclerae  o : sclerae white  o Pupils and Irises  o : pupils equal and round  o Eyelids/Ocular Adnexae  o : eyelid appearance normal, no exudates present  · Respiratory  o Respiratory Effort  o : breathing unlabored  o Inspection of Chest  o : normal appearance  o Auscultation of Lungs  o : normal breath sounds throughout inspiration and expiration  · Cardiovascular  o Heart  o :   § Auscultation of Heart  § : regular rate and rhythm, no murmurs, gallops or rubs  o Peripheral Vascular System  o :   § Extremities  § : no clubbing or edema  · Musculoskeletal  o Spine  o :   § Inspection/Palpation  § : no spinal tenderness, scoliosis or kyphosis present  § Range of Motion  § : spine range of motion normal  o Right Upper Extremity  o :   § Inspection/Palpation  § : no tenderness to palpation, ROM normal  o Left Upper Extremity  o :   § Inspection/Palpation  § : no tenderness to palpation, ROM normal  o Right Lower Extremity  o :   § Inspection/Palpation  § : no joint or limb tenderness to palpation, ROM normal  o Left Lower Extremity  o :   § Inspection/Palpation  § : no joint or limb tenderness to palpation, ROM normal  · Skin and Subcutaneous Tissue  o General Inspection  o : no rashes or lesions present, no areas of discoloration  o Body Hair  o : hair normal for age, general body hair distribution " normal for age  o Digits and Nails  o : no clubbing, cyanosis, deformities or edema present, normal appearing nails  · Neurologic  o Mental Status Examination  o :   § Orientation  § : grossly oriented to person, place and time  o Gait and Station  o : normal gait, able to stand without difficulty  · Psychiatric  o Judgement and Insight  o : judgment and insight intact  o Mood and Affect  o : mood normal, affect appropriate  o Presence of Abnormal Thoughts  o : no hallucinations, no delusions present, no psychotic thoughts  · Left Shoulder  o Inspection  o : no swelling  o Palpation  o : non tender to palpation  o Range of Motion  o : abduction 90 degrees   o Strength  o : resisted supination weak   · Right Wrist  o Palpation  o : scaphoid tender, tenderness to palpation           Assessment  · Shoulder pain, left     719.41/M25.512  · Fall     E888.9/W19.XXXA  · Wrist pain, acute, right     719.43/M25.531      Plan  · Orders  o ACO-39: Current medications updated and reviewed () - - 06/05/2020  o ACO-14: Influenza immunization was not administered for reasons documented () - - 06/05/2020  o Wrist (Right) 3 or more views X-Ray [facility] ([id number]) - 719.43/M25.531 - 06/05/2020  o Shoulder (Left) 2 or more views X-Ray [facility] ([id number]) - 719.41/M25.512 - 06/05/2020  · Medications  o Adacel(Tdap Adolesn/Adult)(PF) 2 Lf-(2.5-5-3-5 mcg)-5Lf/0.5 mL intramuscular syringe   SIG: ---   DISP: # 0 with 0 refills  Discontinued on 06/05/2020     o amoxicillin 500 mg oral capsule   SIG: ---   DISP: # 0 with 0 refills  Discontinued on 06/05/2020     o diclofenac sodium 1 % topical gel   SIG: ---   DISP: # 0 with 0 refills  Discontinued on 06/05/2020     o diclofenac sodium 50 mg oral tablet,delayed release (DR/EC)   SIG: ---   DISP: # 0 with 0 refills  Discontinued on 06/05/2020     o hydrocodone-acetaminophen 5-325 mg oral tablet   SIG: ---   DISP: # 0 with 0 refills  Discontinued on 06/05/2020      o prednisone 20 mg oral tablet   SIG: ---   DISP: # 0 with 0 refills  Discontinued on 06/05/2020     o valacyclovir 1 gram oral tablet   SIG: ---   DISP: # 0 with 0 refills  Discontinued on 06/05/2020     o Medications have been Reconciled  o Transition of Care or Provider Policy  · Instructions  o Patient was educated/instructed on their diagnosis, treatment and medications prior to discharge from the clinic today.            Electronically Signed by: VALENTINE Cast -Author on June 5, 2020 02:13:10 PM

## 2021-05-13 NOTE — PROGRESS NOTES
Progress Note      Patient Name: Meliza Art   Patient ID: [id number]   Sex: Female   YOB: 1955    Primary Care Provider: Bishnu GRIJALVA   Referring Provider: Oc Wang MD    Visit Date: November 4, 2020    Provider: Rosie Acevedo PA-C   Location: [facility]   Location Address: [address] [place] [address]  [id number]   Location Phone: [phone]          Chief Complaint  · Follow up left knee pain      History Of Present Illness  Meliza Art is a 65 year old /Black female who presents today to [facility].      She is here for left knee pain. She has advanced OA. She states pain has progressed and states swelling. She works on her feet on concrete floors at [employer].       Past Medical History  Aftercare following right knee joint replacement surgery; Allergic conjunctivitis and rhinitis; Anemia; Arthritis; Hepatitis; History of total knee arthroplasty, right; Limb Swelling; Lipoma of arm; Screening Mammogram; Seasonal allergies; Sinus trouble         Past Surgical History  Artificial Joints/Limbs; Colonoscopy; Excision of right breast mass with needle localization; Hysterectomy; Joint Surgery; Knee repair; Tubal ligation         Medication List  Tylenol Arthritis Pain 650 mg oral tablet extended release; Voltaren 1 % topical gel         Allergy List  SULFA (SULFONAMIDES)       Allergies Reconciled  Family Medical History  Stroke; Heart Disease; Cancer, Unspecified; Diabetes, unspecified type; Multiple myeloma; Diabetes; Family history of certain chronic disabling diseases; arthritis; Family history of Arthritis         Social History  Alcohol (Never); Alcohol Use (Never); .; lives with other; Recreational Drug Use (Never); Retired.; Tobacco (Never); Working         Review of Systems  · Constitutional  o Denies  o : fever, chills, weight loss  · Cardiovascular  o Denies  o : chest pain, shortness of  "breath  · Gastrointestinal  o Denies  o : liver disease, heartburn, nausea, blood in stools  · Genitourinary  o Denies  o : painful urination, blood in urine  · Integument  o Denies  o : rash, itching  · Neurologic  o Denies  o : headache, weakness, loss of consciousness  · Musculoskeletal  o Admits  o : painful, swollen joints  · Psychiatric  o Denies  o : drug/alcohol addiction, anxiety, depression      Vitals  Date Time BP Position Site L\R Cuff Size HR RR TEMP (F) WT  HT  BMI kg/m2 BSA m2 O2 Sat FR L/min FiO2 HC       11/04/2020 07:38 AM      82 - R   188lbs 0oz 5'  11\" 26.22 2.07 97 %            Physical Examination  · Constitutional  o Appearance  o : well developed, well-nourished, no obvious deformities present  · Head and Face  o Head  o :   § Inspection  § : normocephalic  o Face  o :   § Inspection  § : no facial lesions  · Eyes  o Conjunctivae  o : conjunctivae normal  o Sclerae  o : sclerae white  · Ears, Nose, Mouth and Throat  o Ears  o :   § External Ears  § : appearance within normal limits  § Hearing  § : intact  o Nose  o :   § External Nose  § : appearance normal  · Neck  o Inspection/Palpation  o : normal appearance  o Range of Motion  o : full range of motion  · Respiratory  o Respiratory Effort  o : breathing unlabored  o Inspection of Chest  o : normal appearance  o Auscultation of Lungs  o : no audible wheezing or rales  · Cardiovascular  o Heart  o : regular rate  · Gastrointestinal  o Abdominal Examination  o : soft and non-tender  · Skin and Subcutaneous Tissue  o General Inspection  o : intact, no rashes  · Psychiatric  o General  o : Alert and oriented x3  o Judgement and Insight  o : judgment and insight intact  o Mood and Affect  o : mood normal, affect appropriate  · Left Knee  o Inspection  o : 2+ Effusion. No erythema or warmth. Tender joint lines. Full extension. Flexion 120 degrees. Stable to varus/valgus stress. Negative Lachman's. + Chelita's. Calf supple/nontender. Negative " Abby's. Neurovascularly intact. Full weight bearing.   · Injection Note/Aspiration Note  o Site  o : left knee   o Procedure  o : Procedure: After educating the patient, patient gave consent for procedure. After using Chloraprep, the joint space was injected. The patient tolerated the procedure well.   o Medication  o : 80 mg of DepoMedrol with 9cc of 1% Lidocaine          Assessment  · Primary osteoarthritis of left knee     715.16/M17.12  · Pain: Knee     719.46/M25.569      Plan  · Orders  o Depo-Medrol injection 80mg () - 719.46/M25.569 - 11/04/2020   Lot [id number] exp 08 21 Anayeli CARRILLO  o Knee Intra-articular Injection without US Guidance [facility] ([id number]) - 719.46/M25.569 - 11/04/2020   Lot 08 [id number] [id number] exo 08 21 [facility] [place] Rosie CARRILLO  · Medications  o Medications have been Reconciled  o Transition of Care or Provider Policy  · Instructions  o Reviewed the patient's Past Medical, Social, and Family history as well as the ROS at today's visit, no changes.  o Call or return if worsening symptoms.  o Consent and time out done. Left knee aspirated/injected. 35 cc of straw colored fluid aspirated. Patient tolerated procedure well. Folllow up PRN. May consider gel injection in future.   o Electronically Identified Patient Education Materials Provided Electronically            Electronically Signed by: GERARDO Barba-C -Author on November 4, 2020 08:19:14 AM  Electronically Co-signed by: Oc Wang MD -Reviewer on November 4, 2020 07:33:33 PM

## 2021-05-13 NOTE — PROGRESS NOTES
Progress Note      Patient Name: Meliza Art   Patient ID: [id number]   Sex: Female   YOB: 1955    Primary Care Provider: Bishnu GRIJALVA    Visit Date: September 21, 2020    Provider: cO Wang MD   Location: [facility]   Location Address: [address] [place] [address]  [id number]   Location Phone: [phone]          Chief Complaint  · Left Shoulder Pain      History Of Present Illness  Meliza Art is a 65 year old /Black female who presents today to [facility].      Patient presents today with a follow-up for left shoulder pain. Patient sustained initial shoulder injury at work when she missed the last step of the ladder and caught herself and caused pain in her left shoulder. Patient has a history of OA. Patient has been experiencing tenderness in her neck. Patient has pain on her anterior shoulder that radiates down her left elbow. Patient has some weakness in her left arm. Patient had an MRI that revealed a full thickness rotator cuff tear of the left shoulder. Patient presents today because workers comp denied her surgery for a left RTC repair. Patient wants to start from the beginning and try other options. Patient presents today with wanting an injection for her shoulder.                    Past Medical History  Aftercare following right knee joint replacement surgery; Allergic conjunctivitis and rhinitis; Anemia; Arthritis; Hepatitis; History of total knee arthroplasty, right; Limb Swelling; Lipoma of arm; Screening Mammogram; Seasonal allergies; Sinus trouble         Past Surgical History  Artificial Joints/Limbs; Colonoscopy; Excision of right breast mass with needle localization; Hysterectomy; Joint Surgery; Knee repair; Tubal ligation         Medication List  Tylenol Arthritis Pain 650 mg oral tablet extended release; Voltaren 1 % topical gel         Allergy List  SULFA (SULFONAMIDES)       Allergies  "Reconciled  Family Medical History  Stroke; Heart Disease; Cancer, Unspecified; Diabetes, unspecified type; Multiple myeloma; Diabetes; Family history of certain chronic disabling diseases; arthritis; Family history of Arthritis         Social History  Alcohol (Never); Alcohol Use (Never); .; lives with other; Recreational Drug Use (Never); Retired.; Tobacco (Never); Working         Immunizations  Name Date Admin   Influenza Refused         Review of Systems  · Constitutional  o Denies  o : fever, chills, weight loss  · Cardiovascular  o Denies  o : chest pain, shortness of breath  · Gastrointestinal  o Denies  o : liver disease, heartburn, nausea, blood in stools  · Genitourinary  o Denies  o : painful urination, blood in urine  · Integument  o Denies  o : rash, itching  · Neurologic  o Denies  o : headache, weakness, loss of consciousness  · Musculoskeletal  o Denies  o : painful, swollen joints  · Psychiatric  o Denies  o : drug/alcohol addiction, anxiety, depression      Vitals  Date Time BP Position Site L\R Cuff Size HR RR TEMP (F) WT  HT  BMI kg/m2 BSA m2 O2 Sat        09/21/2020 03:25 PM      88 - R   190lbs 0oz 5'  10\" 27.26 2.06 97 %          Physical Examination  · Constitutional  o Appearance  o : well developed, well-nourished, no obvious deformities present  · Head and Face  o Head  o :   § Inspection  § : normocephalic  o Face  o :   § Inspection  § : no facial lesions  · Eyes  o Conjunctivae  o : conjunctivae normal  o Sclerae  o : sclerae white  · Ears, Nose, Mouth and Throat  o Ears  o :   § External Ears  § : appearance within normal limits  § Hearing  § : intact  o Nose  o :   § External Nose  § : appearance normal  · Neck  o Inspection/Palpation  o : normal appearance  o Range of Motion  o : full range of motion  · Respiratory  o Respiratory Effort  o : breathing unlabored  o Inspection of Chest  o : normal appearance  o Auscultation of Lungs  o : no audible wheezing or " rales  · Cardiovascular  o Heart  o : regular rate  · Gastrointestinal  o Abdominal Examination  o : soft and non-tender  · Skin and Subcutaneous Tissue  o General Inspection  o : intact, no rashes  · Psychiatric  o General  o : Alert and oriented x3  o Judgement and Insight  o : judgment and insight intact  o Mood and Affect  o : mood normal, affect appropriate  · Left Shoulder  o Inspection  o : Sensation grossly intact. No swelling, skin discoloration or swelling. Palpable tenderness over the AC joint and the subacromial bursa. Palpable tenderness over the greater tuberosity. Forward flexion to 140. Abduction to 145. Internal rotation to L4. Postive empty can testing. Positive Neers. Positive Urrutia. Pain with cross body adduction. Weakened strength of left arm.   · Injection Note/Aspiration Note  o Site  o : left shoulder   o Procedure  o : Procedure: After educating the patient, patient gave consent for procedure. After using Chloraprep, the joint space was injected. The patient tolerated the procedure well.   o Medication  o : 80 mg of DepoMedrol with 9cc of 1% Lidocaine          Assessment  · Left shoulder pain, unspecified chronicity     719.41/M25.512  · Rotator cuff tear of left shoulder     840.4/M75.100  · Shoulder impingement syndrome, left     726.2/M75.42      Plan  · Orders  o Depo-Medrol injection 80mg () - - 09/21/2020   Lot [id number] Exp 07 2021 [employer] Administered by LUCY Wang MD  o Shoulder Intra-articular Injection without US Guidance [facility] ([id number]) - - 09/21/2020   Lot [id number] Exp 08 01 2021 [employer] Administered by LUCY Wang MD  · Medications  o Medications have been Reconciled  o Transition of Care or Provider Policy  · Instructions  o Dr. Wang saw and examined the patient and agrees with plan.   o X-rays reviewed by Dr. Wang.  o Reviewed the patient's Past Medical, Social, and Family history as well as the ROS at today's visit, no changes.  o Call or return if worsening  symptoms.  o Follow Up PRN.  o This note was transcribed by Brielle Martines. zaida  o Discussed diagnosis and treatment options with the patient. Patient given a work restriction note and opted to try an injection. Patient tolerated the injection well and will follow-up PRN.             Electronically Signed by: Brielle Martines-, Other -Author on September 24, 2020 09:30:36 AM  Electronically Co-signed by: Oc Wang MD -Reviewer on September 24, 2020 05:14:33 PM

## 2021-05-13 NOTE — PROGRESS NOTES
Progress Note      Patient Name: Meliza Art   Patient ID: [id number]   Sex: Female   YOB: 1955    Primary Care Provider: Bishnu GRIJALVA    Visit Date: May 22, 2020    Provider: Rosie Acevedo PA-C   Location: [facility]   Location Address: [address] [place] [address]  [id number]   Location Phone: [phone]          Chief Complaint  · Left knee pain      History Of Present Illness  Video Conferencing Visit  Meliza Art is a 64 year old /Black female who is presenting for evaluation via video conferencing via televisit. Verbal consent obtained before beginning visit.   The following staff were present during this visit: Rosie Acevedo PA-C      She is following up for left knee pain. She states her pain is much improved after knee aspiration and injection. She is happy.       Past Medical History  Aftercare following right knee joint replacement surgery; Allergic conjunctivitis and rhinitis; Anemia; Arthritis; Hepatitis; History of total knee arthroplasty, right; Limb Swelling; Screening Mammogram; Seasonal allergies; Sinus trouble         Past Surgical History  Artificial Joints/Limbs; Colonoscopy; Excision of right breast mass with needle localization; Hysterectomy; Joint Surgery; Knee repair; Tubal ligation         Medication List  Adacel(Tdap Adolesn/Adult)(PF) 2 Lf-(2.5-5-3-5 mcg)-5Lf/0.5 mL intramuscular syringe; amoxicillin 500 mg oral capsule; diclofenac sodium 1 % topical gel; diclofenac sodium 50 mg oral tablet,delayed release (DR/EC); hydrocodone-acetaminophen 5-325 mg oral tablet; ibuprofen 800 mg oral tablet; prednisone 20 mg oral tablet; valacyclovir 1 gram oral tablet; Voltaren 1 % topical gel         Allergy List  SULFA (SULFONAMIDES)       Allergies Reconciled  Family Medical History  Stroke; Heart Disease; Cancer, Unspecified; Diabetes, unspecified type; Multiple myeloma; Diabetes; Family history of certain chronic disabling  "diseases; arthritis; Family history of Arthritis         Social History  Alcohol (Never); Alcohol Use (Current some day); .; lives with other; Recreational Drug Use (Never); Retired.; Tobacco (Never)         Review of Systems  · Constitutional  o Denies  o : fever, chills, weight loss  · Cardiovascular  o Denies  o : chest pain, shortness of breath  · Gastrointestinal  o Denies  o : liver disease, heartburn, nausea, blood in stools  · Genitourinary  o Denies  o : painful urination, blood in urine  · Integument  o Denies  o : rash, itching  · Neurologic  o Denies  o : headache, weakness, loss of consciousness  · Musculoskeletal  o Admits  o : painful, swollen joints  · Psychiatric  o Denies  o : drug/alcohol addiction, anxiety, depression      Vitals  Date Time BP Position Site L\R Cuff Size HR RR TEMP (F) WT  HT  BMI kg/m2 BSA m2 O2 Sat HC       05/22/2020 03:10 PM         192lbs 0oz 5'  10\" 27.55 2.07           Physical Examination  · Constitutional  o Appearance  o : well developed, well-nourished, no obvious deformities present  · Head and Face  o Head  o :   § Inspection  § : normocephalic  o Face  o :   § Inspection  § : no facial lesions  · Eyes  o Conjunctivae  o : conjunctivae normal  o Sclerae  o : sclerae white  · Ears, Nose, Mouth and Throat  o Ears  o :   § External Ears  § : appearance within normal limits  § Hearing  § : intact  o Nose  o :   § External Nose  § : appearance normal  · Neck  o Inspection/Palpation  o : normal appearance  o Range of Motion  o : full range of motion  · Respiratory  o Respiratory Effort  o : breathing unlabored  o Inspection of Chest  o : normal appearance  o Auscultation of Lungs  o : no audible wheezing or rales  · Skin and Subcutaneous Tissue  o General Inspection  o : intact, no rashes  · Psychiatric  o General  o : Alert and oriented x3  o Judgement and Insight  o : judgment and insight intact  o Mood and Affect  o : mood normal, affect appropriate  · Left " Knee  o Inspection  o : Swelling improved. ROM appears normal.           Assessment  · Primary osteoarthritis of left knee     715.16/M17.12  · Left knee pain, unspecified chronicity     719.46/M25.562      Plan  · Medications  o Medications have been Reconciled  o Transition of Care or Provider Policy  · Instructions  o Reviewed the patient's Past Medical, Social, and Family history as well as the ROS at today's visit, no changes.  o Call or return if worsening symptoms.  o Follow up as needed.            Electronically Signed by: Rosie Acevedo PA-C -Author on May 27, 2020 12:26:09 PM

## 2021-05-14 VITALS
OXYGEN SATURATION: 98 % | RESPIRATION RATE: 16 BRPM | SYSTOLIC BLOOD PRESSURE: 126 MMHG | HEART RATE: 91 BPM | HEIGHT: 71 IN | BODY MASS INDEX: 26.88 KG/M2 | TEMPERATURE: 97 F | WEIGHT: 192 LBS | DIASTOLIC BLOOD PRESSURE: 82 MMHG

## 2021-05-14 VITALS — HEIGHT: 71 IN | HEART RATE: 80 BPM | BODY MASS INDEX: 28.07 KG/M2 | WEIGHT: 200.5 LBS | OXYGEN SATURATION: 97 %

## 2021-05-14 VITALS — BODY MASS INDEX: 26.32 KG/M2 | HEART RATE: 82 BPM | OXYGEN SATURATION: 97 % | WEIGHT: 188 LBS | HEIGHT: 71 IN

## 2021-05-14 VITALS — BODY MASS INDEX: 26.88 KG/M2 | OXYGEN SATURATION: 98 % | WEIGHT: 192 LBS | HEIGHT: 71 IN | HEART RATE: 85 BPM

## 2021-05-14 VITALS
HEIGHT: 70 IN | SYSTOLIC BLOOD PRESSURE: 131 MMHG | WEIGHT: 187 LBS | HEART RATE: 99 BPM | OXYGEN SATURATION: 98 % | DIASTOLIC BLOOD PRESSURE: 79 MMHG | BODY MASS INDEX: 26.77 KG/M2 | TEMPERATURE: 97.4 F | RESPIRATION RATE: 16 BRPM

## 2021-05-14 VITALS — OXYGEN SATURATION: 97 % | WEIGHT: 190 LBS | HEIGHT: 70 IN | HEART RATE: 88 BPM | BODY MASS INDEX: 27.2 KG/M2

## 2021-05-14 VITALS — BODY MASS INDEX: 26.88 KG/M2 | WEIGHT: 192 LBS | HEART RATE: 82 BPM | HEIGHT: 71 IN | OXYGEN SATURATION: 99 %

## 2021-05-14 NOTE — PROGRESS NOTES
Progress Note      Patient Name: Meliza Art   Patient ID: [id number]   Sex: Female   YOB: 1955    Primary Care Provider: Bishnu GRIJALVA   Referring Provider: Oc Wang MD    Visit Date: February 22, 2021    Provider: Rosie Acevedo PA-C   Location: [facility]   Location Address: [address] [place] [address]  [id number]   Location Phone: [phone]          Chief Complaint  · Follow up left shoulder arthroscopy      History Of Present Illness  Meliza Art is a 65 year old /Black female who presents today to [facility].      She is s/p left arthroscopic SAD/DC and mini open RCR 1/7/21 by Dr. Wang. She is doing well. She is compliant with sling. Her pain is well controlled. She has had 3 PT visits, as she missed a couple due to weather.             Past Medical History  Aftercare following right knee joint replacement surgery; Allergic conjunctivitis and rhinitis; Anemia; Arthritis; Hepatitis; History of total knee arthroplasty, right; Limb Swelling; Lipoma of arm; Screening Mammogram; Seasonal allergies; Sinus trouble         Past Surgical History  Artificial Joints/Limbs; Colonoscopy; Excision of right breast mass with needle localization; Hysterectomy; Joint Surgery; Knee repair; Tubal ligation         Medication List  Tylenol Arthritis Pain 650 mg oral tablet extended release; Voltaren 1 % topical gel         Allergy List  SULFA (SULFONAMIDES)       Allergies Reconciled  Family Medical History  Stroke; Heart Disease; Cancer, Unspecified; Diabetes, unspecified type; Multiple myeloma; Diabetes; Family history of certain chronic disabling diseases; arthritis; Family history of Arthritis         Social History  Alcohol (Never); Alcohol Use (Never); .; lives with other; Recreational Drug Use (Never); Retired.; Tobacco (Never); Working         Review of Systems  · Constitutional  o Denies  o : fever, chills, weight  "loss  · Cardiovascular  o Denies  o : chest pain, shortness of breath  · Gastrointestinal  o Denies  o : liver disease, heartburn, nausea, blood in stools  · Genitourinary  o Denies  o : painful urination, blood in urine  · Integument  o Denies  o : rash, itching  · Neurologic  o Denies  o : headache, weakness, loss of consciousness  · Musculoskeletal  o Admits  o : painful, swollen joints  · Psychiatric  o Denies  o : drug/alcohol addiction, anxiety, depression      Vitals  Date Time BP Position Site L\R Cuff Size HR RR TEMP (F) WT  HT  BMI kg/m2 BSA m2 O2 Sat FR L/min FiO2 HC       02/22/2021 08:55 AM      85 - R   192lbs 0oz 5'  11\" 26.78 2.09 98 %            Physical Examination  · Constitutional  o Appearance  o : well developed, well-nourished, no obvious deformities present  · Head and Face  o Head  o :   § Inspection  § : normocephalic  o Face  o :   § Inspection  § : no facial lesions  · Eyes  o Conjunctivae  o : conjunctivae normal  o Sclerae  o : sclerae white  · Ears, Nose, Mouth and Throat  o Ears  o :   § External Ears  § : appearance within normal limits  § Hearing  § : intact  o Nose  o :   § External Nose  § : appearance normal  · Neck  o Inspection/Palpation  o : normal appearance  o Range of Motion  o : full range of motion  · Respiratory  o Respiratory Effort  o : breathing unlabored  o Inspection of Chest  o : normal appearance  o Auscultation of Lungs  o : no audible wheezing or rales  · Cardiovascular  o Heart  o : regular rate  · Gastrointestinal  o Abdominal Examination  o : soft and non-tender  · Skin and Subcutaneous Tissue  o General Inspection  o : intact, no rashes  · Psychiatric  o General  o : Alert and oriented x3  o Judgement and Insight  o : judgment and insight intact  o Mood and Affect  o : mood normal, affect appropriate  · Left Shoulder  o Inspection  o : Well healed incisions. Forward flexion 80 degrees. Abduction 80 degrees. ER 30 degrees. IR limited. Strength decreased. All " compartments soft and well perfused. Full ROM of elbow/wrist/digits. Neurovascularly intact.           Assessment  · Left Aftercare following surgery of the muskuloskeletal system     V54.81      Plan  · Medications  o Medications have been Reconciled  o Transition of Care or Provider Policy  · Instructions  o Reviewed the patient's Past Medical, Social, and Family history as well as the ROS at today's visit, no changes.  o Call or return if worsening symptoms.  o Continue PT. Follow up 6 weeks. Discussed restrictions. Wean from sling.   o Electronically Identified Patient Education Materials Provided Electronically            Electronically Signed by: GERARDO Barba-C -Author on February 22, 2021 09:04:01 AM  Electronically Co-signed by: Oc Wang MD -Reviewer on February 22, 2021 09:54:54 PM

## 2021-05-14 NOTE — PROGRESS NOTES
Progress Note      Patient Name: Meliza Art   Patient ID: [id number]   Sex: Female   YOB: 1955    Primary Care Provider: Bishnu GRIJALVA    Visit Date: March 29, 2021    Provider: Galindo Lowe PA-C   Location: [facility]   Location Address: [address] [place] [address]  [id number]   Location Phone: [phone]          Chief Complaint  · left shoulder pain'      History Of Present Illness  Meliza Art is a 65 year old /Black female who presents today to [facility].      Patient follows up today for left shoulder arthroscopy with mini open rotator cuff repair and distal clavicle resection and subacromial decompression performed 1/7/2021 by Dr. Batres.  Patient reports pain has improved since the time of surgery.  Patient has been adherent to interventions and instructions.       Past Medical History  Aftercare following right knee joint replacement surgery; Allergic conjunctivitis and rhinitis; Anemia; Arthritis; Hepatitis; History of total knee arthroplasty, right; Limb Swelling; Lipoma of arm; Screening Mammogram; Seasonal allergies; Sinus trouble         Past Surgical History  Artificial Joints/Limbs; Colonoscopy; Excision of right breast mass with needle localization; Hysterectomy; Joint Surgery; Knee repair; Tubal ligation         Medication List  Tylenol Arthritis Pain 650 mg oral tablet extended release; Voltaren 1 % topical gel         Allergy List  SULFA (SULFONAMIDES)         Family Medical History  Stroke; Heart Disease; Cancer, Unspecified; Diabetes, unspecified type; Multiple myeloma; Diabetes; Family history of certain chronic disabling diseases; arthritis; Family history of Arthritis         Social History  Alcohol (Never); Alcohol Use (Never); .; lives with other; Recreational Drug Use (Never); Retired.; Tobacco (Never); Working         Immunizations  Name Date Admin   Influenza Refused   Influenza  "Refused         Review of Systems  · Constitutional  o Denies  o : fever, chills, weight loss  · Cardiovascular  o Denies  o : chest pain, shortness of breath  · Gastrointestinal  o Denies  o : liver disease, heartburn, nausea, blood in stools  · Genitourinary  o Denies  o : painful urination, blood in urine  · Integument  o Denies  o : rash, itching  · Neurologic  o Denies  o : headache, weakness, loss of consciousness  · Musculoskeletal  o Denies  o : painful, swollen joints  · Psychiatric  o Denies  o : drug/alcohol addiction, anxiety, depression      Vitals  Date Time BP Position Site L\R Cuff Size HR RR TEMP (F) WT  HT  BMI kg/m2 BSA m2 O2 Sat FR L/min FiO2 HC       03/29/2021 09:06 AM      80 - R   200lbs 8oz 5'  11\" 27.96 2.13 97 %            Physical Examination  · Constitutional  o Appearance  o : well developed, well-nourished, no obvious deformities present  · Head and Face  o Head  o :   § Inspection  § : normocephalic  o Face  o :   § Inspection  § : no facial lesions  · Eyes  o Conjunctivae  o : conjunctivae normal  o Sclerae  o : sclerae white  · Ears, Nose, Mouth and Throat  o Ears  o :   § External Ears  § : appearance within normal limits  § Hearing  § : intact  o Nose  o :   § External Nose  § : appearance normal  · Neck  o Inspection/Palpation  o : normal appearance  o Range of Motion  o : full range of motion  · Respiratory  o Respiratory Effort  o : breathing unlabored  o Inspection of Chest  o : normal appearance  o Auscultation of Lungs  o : no audible wheezing or rales  · Cardiovascular  o Heart  o : regular rate  · Gastrointestinal  o Abdominal Examination  o : soft and non-tender  · Skin and Subcutaneous Tissue  o General Inspection  o : intact, no rashes  · Psychiatric  o General  o : Alert and oriented x3  o Judgement and Insight  o : judgment and insight intact  o Mood and Affect  o : mood normal, affect appropriate  · Left Shoulder  o Inspection  o : Surgical sites appreciated be " healing appropriately with good scar formation that is supple. Range of motion remains decreased compared to contralateral side as expected. Neurovascularly intact distally.          Assessment  · Pain: Shoulder     719.41/M25.519  · Surgical aftercare, musculoskeletal system     V58.78/Z47.89      Plan  · Instructions  o Reviewed the patient's Past Medical, Social, and Family history as well as the ROS at today's visit, no changes.  o Call or return if worsening symptoms.  o Patient is 6 weeks post op. Patient educated to focus on ROM, no heavy lifting or push/pull, and is to discontinue sling and bolster.  o .Portions of this note were generated with voice recognition software. While efforts have been made to proofread the text, some sound alike errors may still persist.             Electronically Signed by: Galindo Lowe PA-C -Author on March 30, 2021 07:43:09 AM

## 2021-05-14 NOTE — PROGRESS NOTES
Progress Note      Patient Name: Meliza Art   Patient ID: [id number]   Sex: Female   YOB: 1955    Primary Care Provider: Bishnu GRIJALVA   Referring Provider: Oc Wang MD    Visit Date: January 20, 2021    Provider: Rosie Acevedo PA-C   Location: [facility]   Location Address: [address] [place] [address]  [id number]   Location Phone: [phone]          Chief Complaint  · Surgery follow up left shoulder arthroscopy      History Of Present Illness  Meliza Art is a 65 year old /Black female who presents today to [facility].      She is s/p left arthroscopic SAD/DC and mini open RCR 1/7/21 by Dr. Wang. She is doing well. She is compliant with sling. Her pain is well controlled.       Past Medical History  Aftercare following right knee joint replacement surgery; Allergic conjunctivitis and rhinitis; Anemia; Arthritis; Hepatitis; History of total knee arthroplasty, right; Limb Swelling; Lipoma of arm; Screening Mammogram; Seasonal allergies; Sinus trouble         Past Surgical History  Artificial Joints/Limbs; Colonoscopy; Excision of right breast mass with needle localization; Hysterectomy; Joint Surgery; Knee repair; Tubal ligation         Medication List  Tylenol Arthritis Pain 650 mg oral tablet extended release; Voltaren 1 % topical gel         Allergy List  SULFA (SULFONAMIDES)       Allergies Reconciled  Family Medical History  Stroke; Heart Disease; Cancer, Unspecified; Diabetes, unspecified type; Multiple myeloma; Diabetes; Family history of certain chronic disabling diseases; arthritis; Family history of Arthritis         Social History  Alcohol (Never); Alcohol Use (Never); .; lives with other; Recreational Drug Use (Never); Retired.; Tobacco (Never); Working         Review of Systems  · Constitutional  o Denies  o : fever, chills, weight loss  · Cardiovascular  o Denies  o : chest pain, shortness of  "breath  · Gastrointestinal  o Denies  o : liver disease, heartburn, nausea, blood in stools  · Genitourinary  o Denies  o : painful urination, blood in urine  · Integument  o Denies  o : rash, itching  · Neurologic  o Denies  o : headache, weakness, loss of consciousness  · Musculoskeletal  o Admits  o : painful, swollen joints  · Psychiatric  o Denies  o : drug/alcohol addiction, anxiety, depression      Vitals  Date Time BP Position Site L\R Cuff Size HR RR TEMP (F) WT  HT  BMI kg/m2 BSA m2 O2 Sat FR L/min FiO2 HC       01/20/2021 09:20 AM      82 - R   192lbs 0oz 5'  11\" 26.78 2.09 99 %            Physical Examination  · Constitutional  o Appearance  o : well developed, well-nourished, no obvious deformities present  · Head and Face  o Head  o :   § Inspection  § : normocephalic  o Face  o :   § Inspection  § : no facial lesions  · Eyes  o Conjunctivae  o : conjunctivae normal  o Sclerae  o : sclerae white  · Ears, Nose, Mouth and Throat  o Ears  o :   § External Ears  § : appearance within normal limits  § Hearing  § : intact  o Nose  o :   § External Nose  § : appearance normal  · Neck  o Inspection/Palpation  o : normal appearance  o Range of Motion  o : full range of motion  · Respiratory  o Respiratory Effort  o : breathing unlabored  o Inspection of Chest  o : normal appearance  o Auscultation of Lungs  o : no audible wheezing or rales  · Cardiovascular  o Heart  o : regular rate  · Gastrointestinal  o Abdominal Examination  o : soft and non-tender  · Skin and Subcutaneous Tissue  o General Inspection  o : intact, no rashes  · Psychiatric  o General  o : Alert and oriented x3  o Judgement and Insight  o : judgment and insight intact  o Mood and Affect  o : mood normal, affect appropriate  · Left Shoulder  o Inspection  o : Well approximated incisions. No signs of infection. Full elbow, wrist, digit ROM. Neurovascularly intact. All compartments soft and well perfused.           Assessment  · Left Aftercare " following surgery of the muskuloskeletal system     V54.81      Plan  · Medications  o Medications have been Reconciled  o Transition of Care or Provider Policy  · Instructions  o Reviewed the patient's Past Medical, Social, and Family history as well as the ROS at today's visit, no changes.  o Call or return if worsening symptoms.  o Continue sling. Start PT in 2 weeks. Follow up 4 weeks. Demonstrated PROM exercises.   o Electronically Identified Patient Education Materials Provided Electronically            Electronically Signed by: GERARDO Barba-C -Author on January 20, 2021 09:48:24 AM  Electronically Co-signed by: Oc Wang MD -Reviewer on January 24, 2021 07:41:07 PM

## 2021-05-15 VITALS
WEIGHT: 209 LBS | BODY MASS INDEX: 29.92 KG/M2 | TEMPERATURE: 96.9 F | DIASTOLIC BLOOD PRESSURE: 91 MMHG | HEART RATE: 68 BPM | OXYGEN SATURATION: 98 % | HEIGHT: 70 IN | RESPIRATION RATE: 18 BRPM | SYSTOLIC BLOOD PRESSURE: 125 MMHG

## 2021-05-15 VITALS
HEIGHT: 70 IN | DIASTOLIC BLOOD PRESSURE: 88 MMHG | WEIGHT: 208 LBS | HEART RATE: 75 BPM | RESPIRATION RATE: 16 BRPM | SYSTOLIC BLOOD PRESSURE: 123 MMHG | OXYGEN SATURATION: 98 % | BODY MASS INDEX: 29.78 KG/M2 | TEMPERATURE: 97.7 F

## 2021-05-15 VITALS
RESPIRATION RATE: 16 BRPM | SYSTOLIC BLOOD PRESSURE: 122 MMHG | BODY MASS INDEX: 29.67 KG/M2 | DIASTOLIC BLOOD PRESSURE: 72 MMHG | TEMPERATURE: 98.2 F | WEIGHT: 200.31 LBS | OXYGEN SATURATION: 98 % | HEIGHT: 69 IN | HEART RATE: 91 BPM

## 2021-05-15 VITALS — HEIGHT: 70 IN | BODY MASS INDEX: 27.49 KG/M2 | WEIGHT: 192 LBS

## 2021-05-15 VITALS
OXYGEN SATURATION: 98 % | RESPIRATION RATE: 16 BRPM | BODY MASS INDEX: 27.49 KG/M2 | SYSTOLIC BLOOD PRESSURE: 108 MMHG | HEART RATE: 89 BPM | HEIGHT: 70 IN | DIASTOLIC BLOOD PRESSURE: 82 MMHG | TEMPERATURE: 97.2 F | WEIGHT: 192 LBS

## 2021-05-15 VITALS
BODY MASS INDEX: 29.35 KG/M2 | TEMPERATURE: 97.5 F | RESPIRATION RATE: 16 BRPM | HEIGHT: 70 IN | OXYGEN SATURATION: 98 % | DIASTOLIC BLOOD PRESSURE: 66 MMHG | WEIGHT: 205 LBS | HEART RATE: 77 BPM | SYSTOLIC BLOOD PRESSURE: 110 MMHG

## 2021-05-15 VITALS
HEIGHT: 70 IN | WEIGHT: 200 LBS | TEMPERATURE: 96.8 F | HEART RATE: 77 BPM | SYSTOLIC BLOOD PRESSURE: 122 MMHG | BODY MASS INDEX: 28.63 KG/M2 | DIASTOLIC BLOOD PRESSURE: 78 MMHG | RESPIRATION RATE: 18 BRPM | OXYGEN SATURATION: 98 %

## 2021-05-15 VITALS — WEIGHT: 195 LBS | BODY MASS INDEX: 28.88 KG/M2 | HEART RATE: 85 BPM | HEIGHT: 69 IN | OXYGEN SATURATION: 98 %

## 2021-05-15 VITALS — HEART RATE: 79 BPM | BODY MASS INDEX: 31.1 KG/M2 | OXYGEN SATURATION: 97 % | HEIGHT: 69 IN | WEIGHT: 210 LBS

## 2021-05-15 VITALS
RESPIRATION RATE: 16 BRPM | HEART RATE: 72 BPM | WEIGHT: 212 LBS | HEIGHT: 70 IN | OXYGEN SATURATION: 98 % | TEMPERATURE: 97.2 F | DIASTOLIC BLOOD PRESSURE: 70 MMHG | BODY MASS INDEX: 30.35 KG/M2 | SYSTOLIC BLOOD PRESSURE: 122 MMHG

## 2021-05-15 VITALS
WEIGHT: 193 LBS | TEMPERATURE: 97.5 F | HEART RATE: 90 BPM | HEIGHT: 70 IN | OXYGEN SATURATION: 99 % | BODY MASS INDEX: 27.63 KG/M2 | RESPIRATION RATE: 18 BRPM | SYSTOLIC BLOOD PRESSURE: 120 MMHG | DIASTOLIC BLOOD PRESSURE: 72 MMHG

## 2021-05-15 VITALS
DIASTOLIC BLOOD PRESSURE: 72 MMHG | HEART RATE: 83 BPM | TEMPERATURE: 97.1 F | OXYGEN SATURATION: 97 % | SYSTOLIC BLOOD PRESSURE: 122 MMHG | RESPIRATION RATE: 16 BRPM | WEIGHT: 189 LBS | BODY MASS INDEX: 27.06 KG/M2 | HEIGHT: 70 IN

## 2021-05-15 VITALS — BODY MASS INDEX: 27.49 KG/M2 | HEIGHT: 70 IN | WEIGHT: 192 LBS

## 2021-05-16 VITALS
TEMPERATURE: 97.3 F | HEIGHT: 70 IN | WEIGHT: 173 LBS | SYSTOLIC BLOOD PRESSURE: 136 MMHG | HEART RATE: 87 BPM | BODY MASS INDEX: 24.77 KG/M2 | RESPIRATION RATE: 18 BRPM | DIASTOLIC BLOOD PRESSURE: 77 MMHG | OXYGEN SATURATION: 98 %

## 2021-05-16 VITALS — HEIGHT: 70 IN | BODY MASS INDEX: 26.2 KG/M2 | HEART RATE: 95 BPM | OXYGEN SATURATION: 98 % | WEIGHT: 183 LBS

## 2021-05-16 VITALS — WEIGHT: 174.5 LBS | OXYGEN SATURATION: 98 % | BODY MASS INDEX: 25.84 KG/M2 | HEART RATE: 97 BPM | HEIGHT: 69 IN

## 2021-05-16 VITALS — OXYGEN SATURATION: 98 % | WEIGHT: 170.37 LBS | HEART RATE: 84 BPM | HEIGHT: 70 IN | BODY MASS INDEX: 24.39 KG/M2

## 2021-05-16 VITALS — HEIGHT: 70 IN | OXYGEN SATURATION: 98 % | BODY MASS INDEX: 25.64 KG/M2 | WEIGHT: 179.12 LBS | HEART RATE: 93 BPM

## 2021-05-16 VITALS
DIASTOLIC BLOOD PRESSURE: 74 MMHG | TEMPERATURE: 97 F | RESPIRATION RATE: 18 BRPM | HEIGHT: 70 IN | HEART RATE: 106 BPM | OXYGEN SATURATION: 100 % | BODY MASS INDEX: 25.77 KG/M2 | WEIGHT: 180 LBS | SYSTOLIC BLOOD PRESSURE: 122 MMHG

## 2021-05-16 VITALS — HEIGHT: 69 IN | BODY MASS INDEX: 25.53 KG/M2 | WEIGHT: 172.37 LBS

## 2021-05-16 VITALS — BODY MASS INDEX: 26.66 KG/M2 | HEIGHT: 69 IN | HEART RATE: 78 BPM | WEIGHT: 180 LBS | OXYGEN SATURATION: 97 %

## 2021-05-16 VITALS
SYSTOLIC BLOOD PRESSURE: 126 MMHG | TEMPERATURE: 98 F | HEIGHT: 69 IN | HEART RATE: 90 BPM | WEIGHT: 172 LBS | RESPIRATION RATE: 16 BRPM | OXYGEN SATURATION: 96 % | BODY MASS INDEX: 25.48 KG/M2 | DIASTOLIC BLOOD PRESSURE: 83 MMHG

## 2021-06-03 ENCOUNTER — TRANSCRIBE ORDERS (OUTPATIENT)
Dept: PHYSICAL THERAPY | Facility: CLINIC | Age: 66
End: 2021-06-03

## 2021-06-03 ENCOUNTER — HOSPITAL ENCOUNTER (OUTPATIENT)
Dept: PHYSICAL THERAPY | Facility: CLINIC | Age: 66
Setting detail: RECURRING SERIES
Discharge: HOME | End: 2021-06-16
Attending: ORTHOPAEDIC SURGERY

## 2021-06-03 DIAGNOSIS — Z47.89 AFTERCARE FOLLOWING SURGERY OF THE MUSCULOSKELETAL SYSTEM, NEC: Primary | ICD-10-CM

## 2021-06-05 NOTE — PROGRESS NOTES
Progress Note      Patient Name: Meliza Art   Patient ID: [id number]   Sex: Female   YOB: 1955    Primary Care Provider: Bishnu GRIJALVA    Visit Date: May 12, 2021    Provider: Galindo Lowe PA-C   Location: [facility]   Location Address: [address] [place] [address]  [id number]   Location Phone: [phone]          Chief Complaint  · Follow up left shoulder arthroscopy      History Of Present Illness  Meliza Art is a 65 year old /Black female who presents today to [facility].      Patient follows up today for left shoulder arthroscopy with mini open RCR, subacromial decompression and distal clavicle resection performed 1/7/2021 by Dr. Wang.  Patient reports pain has improved since the time of surgery.  Patient has been adherent to interventions and instructions.       Past Medical History  Aftercare following right knee joint replacement surgery; Allergic conjunctivitis and rhinitis; Anemia; Arthritis; Hepatitis; History of total knee arthroplasty, right; Limb Swelling; Lipoma of arm; Screening Mammogram; Seasonal allergies; Sinus trouble         Past Surgical History  Artificial Joints/Limbs; Colonoscopy; Excision of right breast mass with needle localization; Hysterectomy; Joint Surgery; Knee repair; Tubal ligation         Medication List  Tylenol Arthritis Pain 650 mg oral tablet extended release; Voltaren 1 % topical gel         Allergy List  SULFA (SULFONAMIDES)         Family Medical History  Stroke; Heart Disease; Cancer, Unspecified; Diabetes, unspecified type; Multiple myeloma; Diabetes; Family history of certain chronic disabling diseases; arthritis; Family history of Arthritis         Social History  Alcohol (Never); Alcohol Use (Never); .; lives with other; Recreational Drug Use (Never); Retired.; Tobacco (Never); Working         Immunizations  Name Date Admin   Influenza Refused   Influenza  "Refused         Review of Systems  · Constitutional  o Denies  o : fever, chills, weight loss  · Cardiovascular  o Denies  o : chest pain, shortness of breath  · Gastrointestinal  o Denies  o : liver disease, heartburn, nausea, blood in stools  · Genitourinary  o Denies  o : painful urination, blood in urine  · Integument  o Denies  o : rash, itching  · Neurologic  o Denies  o : headache, weakness, loss of consciousness  · Musculoskeletal  o Denies  o : painful, swollen joints  · Psychiatric  o Denies  o : drug/alcohol addiction, anxiety, depression      Vitals  Date Time BP Position Site L\R Cuff Size HR RR TEMP (F) WT  HT  BMI kg/m2 BSA m2 O2 Sat FR L/min FiO2 HC       05/12/2021 07:33 AM      79 - R   200lbs 0oz 5'  10\" 28.7 2.12 97 %            Physical Examination  · Constitutional  o Appearance  o : well developed, well-nourished, no obvious deformities present  · Head and Face  o Head  o :   § Inspection  § : normocephalic  o Face  o :   § Inspection  § : no facial lesions  · Eyes  o Conjunctivae  o : conjunctivae normal  o Sclerae  o : sclerae white  · Ears, Nose, Mouth and Throat  o Ears  o :   § External Ears  § : appearance within normal limits  § Hearing  § : intact  o Nose  o :   § External Nose  § : appearance normal  · Neck  o Inspection/Palpation  o : normal appearance  o Range of Motion  o : full range of motion  · Respiratory  o Respiratory Effort  o : breathing unlabored  o Inspection of Chest  o : normal appearance  o Auscultation of Lungs  o : no audible wheezing or rales  · Cardiovascular  o Heart  o : regular rate  · Gastrointestinal  o Abdominal Examination  o : soft and non-tender  · Skin and Subcutaneous Tissue  o General Inspection  o : intact, no rashes  · Psychiatric  o General  o : Alert and oriented x3  o Judgement and Insight  o : judgment and insight intact  o Mood and Affect  o : mood normal, affect appropriate  · Left Shoulder  o Inspection  o : Surgical site is appreciated to have " healed well with good scar formation that is supple. AROM is 155 degrees of forward flexion, 125 degrees of abduction, 50 degrees of external rotation and internal rotation to the TL junction. Decreased strength in forward flexion abduction and external rotation compared to contralateral side.          Assessment  · Left Aftercare following surgery of the muskuloskeletal system     V54.81  · Decreased range of motion of left shoulder     719.51/M25.612      Plan  · Instructions  o Reviewed the patient's Past Medical, Social, and Family history as well as the ROS at today's visit, no changes.  o Call or return if worsening symptoms.  o Patient is to continue with physical therapy. We will begin a trial of light duty at work. Patient to follow-up in 6 weeks to evaluate these interventions.  o Portions of this note were generated with voice recognition software. While efforts have been made to proofread the text, some sound alike errors may still persist.             Electronically Signed by: Galindo Lowe PA-C -Author on May 12, 2021 07:22:46 PM

## 2021-06-07 ENCOUNTER — TREATMENT (OUTPATIENT)
Dept: PHYSICAL THERAPY | Facility: CLINIC | Age: 66
End: 2021-06-07

## 2021-06-07 DIAGNOSIS — Z98.890 STATUS POST ROTATOR CUFF REPAIR: Primary | ICD-10-CM

## 2021-06-07 DIAGNOSIS — M25.512 ACUTE PAIN OF LEFT SHOULDER: ICD-10-CM

## 2021-06-07 PROCEDURE — 97110 THERAPEUTIC EXERCISES: CPT | Performed by: PHYSICAL THERAPIST

## 2021-06-07 NOTE — PROGRESS NOTES
Physical Therapy Daily Progress Note    Patient: Meliza Roberts   : 1955  Diagnosis/ICD-10 Code:  No primary diagnosis found.  Referring practitioner: GERARDO Shaw*  Date of Initial Visit: No linked episodes  Today's Date: 2021  Patient seen for Visit count could not be calculated. Make sure you are using a visit which is associated with an episode. sessions         Meliza Roberts reports: left shoulder pain 2/10 today.  Subjective Questionnaire: QuickDASH: 23      Subjective    Objective   See Exercise, Manual, and Modality Logs for complete treatment.       Assessment/Plan    Progress per Plan of Care           Manual Therapy:         mins  20098;  Therapeutic Exercise:    27     mins  74459;     Neuromuscular Aleida:        mins  13144;    Therapeutic Activity:          mins  22923;     Gait Training:           mins  75472;     Ultrasound:          mins  04283;    Electrical Stimulation:         mins  13938 ( );  Dry Needling          mins self-pay    Timed Treatment:   27   mins   Total Treatment:     27   mins    Carlota Sanchez PT  Physical Therapist

## 2021-06-09 ENCOUNTER — TREATMENT (OUTPATIENT)
Dept: PHYSICAL THERAPY | Facility: CLINIC | Age: 66
End: 2021-06-09

## 2021-06-09 DIAGNOSIS — Z98.890 STATUS POST ROTATOR CUFF REPAIR: ICD-10-CM

## 2021-06-09 DIAGNOSIS — Z98.890 STATUS POST LEFT ROTATOR CUFF REPAIR: Primary | ICD-10-CM

## 2021-06-09 DIAGNOSIS — M25.512 ACUTE PAIN OF LEFT SHOULDER: ICD-10-CM

## 2021-06-09 PROCEDURE — 97110 THERAPEUTIC EXERCISES: CPT | Performed by: PHYSICAL THERAPIST

## 2021-06-09 NOTE — PROGRESS NOTES
Re-Assessment / Re-Certification      Patient: Meliza Roberts   : 1955  Diagnosis/ICD-10 Code:  S/p left rotator cuff repair  Referring practitioner: GERARDO Shaw*  Date of Initial Visit: No linked episodes  Today's Date: 2021  Patient seen for 32 sessions      Subjective:   Meliza Roberts reports: left shoulder pain 2/10  Clinical Progress: improved  Home Program Compliance: Yes  Treatment has included: therapeutic exercise    Subjective   Objective          Active Range of Motion     Additional Active Range of Motion Details  Shoulder  Location:  Left  Flexion Active:  155  Flexion Passive:  160  Abduction Active:  160  Internal Rotation Passive:  70  External Rotation Passive:  70        Strength/Myotome Testing     Additional Strength Details  Shoulder Strength  Location:  Left (improved 1/2 grade)  Flexion:   3+/5 (F+)  Extension:   4-/5  (G-)  Abduction:    3/5  (F)  External Rotation:    3/5  (F)  Internal Rotation:   3+/5 (F+)      Functional Assessment     Comments  Score 23=20-39% limitation improve from prior score 37=60-79% limitation        Assessment & Plan     Assessment  Impairments: impaired physical strength and pain with function  Prognosis: good  Functional Limitations: carrying objects, lifting, pulling, pushing, reaching behind back, reaching overhead and unable to perform repetitive tasks  Goals  Plan Goals: SHOULDER  PROBLEMS:  1. The patient has limited ROM of the left shoulder.    LTG 1: 8 weeks:  The patient will demonstrate 160 degrees of left shoulder flexion, 160 degrees of shoulder abduction, 70 degrees of shoulder external rotation, and 70 degrees of shoulder internal rotation to allow the patient to reach into upper kitchen cabinets and manipulate clothing behind the back with greater ease.    STATUS: met except for flexion   STG 1a: 4 weeks:  The patient will demonstrate 120 degrees of left shoulder flexion, 120 degrees of shoulder abduction,  60 degrees of shoulder external rotation, and 60 degrees of shoulder internal rotation.    STATUS:  Met   TREATMENT: Manual therapy, therapeutic exercise, home exercise instruction, and modalities as needed to include: electrical stimulation, ultrasound, moist heat, and ice.    2. The patient has limited strength of the left shoulder.   LTG 2: 8 weeks:  The patient will demonstrate 4/5 strength for left shoulder flexion, abduction, external rotation, and internal rotation in order to demonstrate improved shoulder stability.    STATUS: progressing   STG 2a: 4 weeks:  The patient will demonstrate 3/5 strength for left shoulder flexion, abduction, external rotation, and internal rotation.    STATUS: Met   STG2b:  2 weeks:  The patient will be independent with home exercises.     STATUS:  Met and progressing as appropriate   TREATMENT: Manual therapy, therapeutic exercise, home exercise instruction, and modalities as needed to include: electrical stimulation, moist heat, and ice.     3. The patient complains of pain to the left shoulder.   LTG 3: 8 weeks:  The patient will report a pain rating of 2/10 or better in order to improve sleep quality and tolerance to performance of activities of daily living.    STATUS:  ongoing   STG 3a: 4 weeks:  The patient will report a pain rating of 4/10 or better.     STATUS:  Met    TREATMENT: Manual therapy, therapeutic exercise, home exercise instruction, and modalities as needed to include: electrical stimulation, moist heat, and ice.    4. Carrying, Moving, and Handling Objects Functional Limitation     LTG 4: 8 weeks:  The patient will demonstrate 1-19% limitation by achieving a score of 19 on the Quick Dash.    STATUS:  progressing   STG 4a: 4 weeks:  The patient will demonstrate 40-59% limitation by achieving a score of 36 on the Quick Dash.      STATUS:  Met   TREATMENT:  Manual therapy, therapeutic exercise, home exercise instruction,    Plan  Therapy options: will be seen  for skilled physical therapy services  Planned modality interventions: TENS and cryotherapy  Planned therapy interventions: manual therapy, strengthening, stretching, joint mobilization and functional ROM exercises  Frequency: 2x week  Duration in weeks: 8  Treatment plan discussed with: patient        Visit Diagnoses:  No diagnosis found.    Progress toward previous goals: Partially Met       Recommendations: Continue as planned  Timeframe: 2 months  Prognosis to achieve goals: good    PT Signature: Carlota Sanchez, PT      Based upon review of the patient's progress and continued therapy plan, it is my medical opinion that Meliza Roberts should continue physical therapy treatment at [facility]  [address] [place] [address]  [place] [address] [phone].    Signature: __________________________________  Galindo Lowe PA-C    Timed:  Manual Therapy:         mins  51213;  Therapeutic Exercise:    32     mins  74467;     Neuromuscular Aleida:        mins  43275;    Therapeutic Activity:          mins  43258;     Gait Training:           mins  86253;     Ultrasound:          mins  81778;    Electrical Stimulation:         mins  68462 ( );    Untimed:  Electrical Stimulation:         mins  15397 ( );  Mechanical Traction:         mins  14104;     Timed Treatment:   32   mins   Total Treatment:     32   mins

## 2021-06-14 ENCOUNTER — TREATMENT (OUTPATIENT)
Dept: PHYSICAL THERAPY | Facility: CLINIC | Age: 66
End: 2021-06-14

## 2021-06-14 DIAGNOSIS — Z98.890 STATUS POST LEFT ROTATOR CUFF REPAIR: ICD-10-CM

## 2021-06-14 PROCEDURE — 97110 THERAPEUTIC EXERCISES: CPT | Performed by: PHYSICAL THERAPIST

## 2021-06-14 NOTE — PROGRESS NOTES
Physical Therapy Daily Treatment Note      Patient: Meliza Roberts   : 1955  Referring practitioner: Dr. Oc Wang  Date of Initial Visit: 21  Today's Date: 2021  Patient seen for 33 sessions         Meliza Roberts reports: left shoulder pain 2/10.     Objective   See Exercise, Manual, and Modality Logs for complete treatment.       Assessment/Plan    Visit Diagnoses:  S/p Left shoulder RTC repair    Progress per Plan of Care and Progress strengthening /stabilization /functional activity           Timed:  Manual Therapy:         mins  84377;  Therapeutic Exercise:    30     mins  33201;     Neuromuscular Aleida:        mins  32451;    Therapeutic Activity:          mins  54807;     Gait Training:           mins  63652;     Ultrasound:          mins  77898;    Electrical Stimulation:         mins  93900 ( );    Untimed:  Electrical Stimulation:         mins  87048 ( );  Mechanical Traction:         mins  43764;     Timed Treatment:   30   mins   Total Treatment:     30  mins  Carlota Sanchez PT  Physical Therapist

## 2021-06-16 ENCOUNTER — TREATMENT (OUTPATIENT)
Dept: PHYSICAL THERAPY | Facility: CLINIC | Age: 66
End: 2021-06-16

## 2021-06-16 DIAGNOSIS — M25.512 ACUTE PAIN OF LEFT SHOULDER: Primary | ICD-10-CM

## 2021-06-16 DIAGNOSIS — Z98.890 STATUS POST LEFT ROTATOR CUFF REPAIR: ICD-10-CM

## 2021-06-16 PROCEDURE — 97110 THERAPEUTIC EXERCISES: CPT | Performed by: PHYSICAL THERAPIST

## 2021-06-16 NOTE — PROGRESS NOTES
Physical Therapy Daily Treatment Note      Patient: Meliza Roberts   : 1955  Referring practitioner: GERARDO Shaw*  Date of Initial Visit: Type: THERAPY  Noted: 2021  Today's Date: 2021  Patient seen for 34  sessions         Meliza Roberts reports: left shoulder pain 2/10    Subjective     Objective   See Exercise, Manual, and Modality Logs for complete treatment.       Assessment & Plan     Assessment  Impairments: activity intolerance, impaired physical strength and pain with function  Prognosis: good  Functional Limitations: carrying objects, lifting, pulling, pushing, reaching behind back, reaching overhead and unable to perform repetitive tasks  Plan  Therapy options: will be seen for skilled physical therapy services  Planned therapy interventions: manual therapy, postural training, strengthening, flexibility and functional ROM exercises        Visit Diagnoses:    ICD-10-CM ICD-9-CM   1. Acute pain of left shoulder  M25.512 719.41   2. Status post left rotator cuff repair  Z98.890 V45.89       Progress per Plan of Care           Timed:  Manual Therapy:         mins  44575;  Therapeutic Exercise:    29     mins  87891;     Neuromuscular Aleida:        mins  84187;    Therapeutic Activity:          mins  96362;     Gait Training:           mins  10858;     Ultrasound:          mins  65875;    Electrical Stimulation:         mins  06550 ( );    Untimed:  Electrical Stimulation:         mins  46901 ( );  Mechanical Traction:         mins  47555;     Timed Treatment:   29   mins   Total Treatment:     29   mins  Carlota Sanchez PT  Physical Therapist

## 2021-06-22 ENCOUNTER — TREATMENT (OUTPATIENT)
Dept: PHYSICAL THERAPY | Facility: CLINIC | Age: 66
End: 2021-06-22

## 2021-06-22 DIAGNOSIS — Z98.890 STATUS POST ROTATOR CUFF REPAIR: ICD-10-CM

## 2021-06-22 DIAGNOSIS — M25.512 ACUTE PAIN OF LEFT SHOULDER: Primary | ICD-10-CM

## 2021-06-22 DIAGNOSIS — Z98.890 STATUS POST LEFT ROTATOR CUFF REPAIR: ICD-10-CM

## 2021-06-22 PROCEDURE — 97110 THERAPEUTIC EXERCISES: CPT | Performed by: PHYSICAL THERAPIST

## 2021-06-22 NOTE — PROGRESS NOTES
Physical Therapy Daily Treatment Note      Patient: Meliza Roberts   : 1955  Referring practitioner: GERARDO Shaw*  Date of Initial Visit: Type: THERAPY  Noted: 2021  Today's Date: 2021  Patient seen for 5 sessions           Subjective Questionnaire:       Subjective Evaluation    History of Present Illness    Subjective comment: Pt presents to clinic with no new complaints.  Pt reported she is on her way to work.Pain  Current pain ratin           Objective           General Comments     Shoulder Comments   Pt presents with lifting LUE over head.  Pt has minimal pain 2/10 before and after tx.       See Exercise, Manual, and Modality Logs for complete treatment.       Assessment & Plan     Assessment  Assessment details: Pt will benefit from continued strengthening to meet LTGs and return to PLOF.  Pt is back to work but on alternate duty secondary to lifting restrictions.           Visit Diagnoses:    ICD-10-CM ICD-9-CM   1. Acute pain of left shoulder  M25.512 719.41   2. Status post left rotator cuff repair  Z98.890 V45.89   3. Status post rotator cuff repair  Z98.890 V45.89       Progress per Plan of Care           Timed:  Manual Therapy:         mins  89739;  Therapeutic Exercise:    33     mins  92749;     Neuromuscular Aleida:        mins  19549;    Therapeutic Activity:          mins  72480;     Gait Training:           mins  21209;     Ultrasound:          mins  23986;    Electrical Stimulation:         mins  37039 ( );    Untimed:  Electrical Stimulation:         mins  39632 ( );  Mechanical Traction:         mins  34149;     Timed Treatment:   33   mins   Total Treatment:     33   mins  Nayeli Rizo PTA  Physical Therapist

## 2021-06-23 ENCOUNTER — OFFICE VISIT (OUTPATIENT)
Dept: ORTHOPEDIC SURGERY | Facility: CLINIC | Age: 66
End: 2021-06-23

## 2021-06-23 VITALS — WEIGHT: 203 LBS | OXYGEN SATURATION: 98 % | HEIGHT: 71 IN | HEART RATE: 91 BPM | BODY MASS INDEX: 28.42 KG/M2

## 2021-06-23 DIAGNOSIS — Z47.89 AFTERCARE FOLLOWING SURGERY OF THE MUSCULOSKELETAL SYSTEM: Primary | ICD-10-CM

## 2021-06-23 DIAGNOSIS — G89.29 CHRONIC LEFT SHOULDER PAIN: ICD-10-CM

## 2021-06-23 DIAGNOSIS — M25.512 CHRONIC LEFT SHOULDER PAIN: ICD-10-CM

## 2021-06-23 PROCEDURE — 99213 OFFICE O/P EST LOW 20 MIN: CPT | Performed by: PHYSICIAN ASSISTANT

## 2021-06-23 RX ORDER — IBUPROFEN 200 MG
200 TABLET ORAL EVERY 6 HOURS PRN
COMMUNITY
End: 2023-01-03

## 2021-06-23 RX ORDER — SENNOSIDES 8.6 MG
CAPSULE ORAL
COMMUNITY

## 2021-06-23 RX ORDER — CHOLECALCIFEROL (VITAMIN D3) 125 MCG
500 CAPSULE ORAL DAILY
COMMUNITY

## 2021-06-23 RX ORDER — HYDROCODONE BITARTRATE AND ACETAMINOPHEN 5; 325 MG/1; MG/1
TABLET ORAL
COMMUNITY
End: 2021-12-09

## 2021-06-23 RX ORDER — FERROUS SULFATE TAB EC 324 MG (65 MG FE EQUIVALENT) 324 (65 FE) MG
324 TABLET DELAYED RESPONSE ORAL
COMMUNITY

## 2021-06-23 NOTE — PROGRESS NOTES
"Chief Complaint  Pain and Follow-up of the Left Shoulder    Subjective          Meliza Roberts presents to [facility] for follow-up on left shoulder pain following left shoulder arthroscopy performed by Dr. Wang 1/7/2021.  Patient has continued doing physical therapy.  She states her range of motion and pain have greatly improved but she still notices some weakness in the left shoulder with lifting pushing and pulling.  She currently works at [employer], has not been doing any lifting at work.  She is doing physical therapy 2 times a week and that is supposed to in July 13, patient is asking for an extension to work on strengthening.  Left shoulder: Well-healed surgical incisions, neurovascularly intact, radial pulses 2+ bilaterally, flexion 180 abduction 180 internal rotation to T12    Objective   Vital Signs:   Pulse 91   Ht 180.3 cm (71\")   Wt 92.1 kg (203 lb)   SpO2 98%   BMI 28.31 kg/m²       Physical Exam  Constitutional:       Appearance: Normal appearance. He is well-developed and normal weight.   HENT:      Head: Normocephalic.      Right Ear: Hearing and external ear normal.      Left Ear: Hearing and external ear normal.      Nose: Nose normal.   Eyes:      Conjunctiva/sclera: Conjunctivae normal.   Cardiovascular:      Rate and Rhythm: Normal rate.   Pulmonary:      Effort: Pulmonary effort is normal.      Breath sounds: No wheezing or rales.   Abdominal:      Palpations: Abdomen is soft.      Tenderness: There is no abdominal tenderness.   Musculoskeletal:      Cervical back: Normal range of motion.   Skin:     Findings: No rash.   Neurological:      Mental Status: He is alert and oriented to person, place, and time.   Psychiatric:         Mood and Affect: Mood and affect normal.         Judgment: Judgment normal.     Ortho Exam  Left shoulder: Skin intact with well-healed surgical incisions.  Neurovascularly intact.  Radial pulses 2+ bilaterally.  Flexion 180, " abduction 180, internal rotation to T12, external rotation to T1.  Full range of motion left elbow wrist and digits on left upper extremity.  Strength with resisted shoulder flexion and extension 4 out of 5 in left upper extremity, 5 out of 5 in right upper extremity.  Result Review :            Imaging Results (Most Recent)     None                Assessment and Plan    Problem List Items Addressed This Visit        Musculoskeletal and Injuries    Aftercare following surgery of the musculoskeletal system - Primary    Current Assessment & Plan     Continue PT, One handed duty at work, f/u 6 wks for recheck.          Relevant Orders    Ambulatory Referral to Physical Therapy Evaluate and treat, POST OP; Cross Fiber, Soft Tissue Mobilizaton, Desensitization    Chronic left shoulder pain          Follow Up   Return in about 6 weeks (around 8/4/2021) for Recheck.  Patient Instructions   Continue physical therapy to work on strengthening exercises.  One-handed duty recommended at work until strength improves.  Follow-up in 6 weeks for recheck.    Patient was given instructions and counseling regarding her condition or for health maintenance advice. Please see specific information pulled into the AVS if appropriate.

## 2021-06-23 NOTE — PATIENT INSTRUCTIONS
Continue physical therapy to work on strengthening exercises.  One-handed duty recommended at work until strength improves.  Follow-up in 6 weeks for recheck.

## 2021-06-24 ENCOUNTER — TREATMENT (OUTPATIENT)
Dept: PHYSICAL THERAPY | Facility: CLINIC | Age: 66
End: 2021-06-24

## 2021-06-24 DIAGNOSIS — M25.512 ACUTE PAIN OF LEFT SHOULDER: Primary | ICD-10-CM

## 2021-06-24 DIAGNOSIS — Z98.890 STATUS POST ROTATOR CUFF REPAIR: ICD-10-CM

## 2021-06-24 DIAGNOSIS — Z98.890 STATUS POST LEFT ROTATOR CUFF REPAIR: ICD-10-CM

## 2021-06-24 PROCEDURE — 97110 THERAPEUTIC EXERCISES: CPT | Performed by: PHYSICAL THERAPIST

## 2021-06-24 NOTE — PROGRESS NOTES
"Physical Therapy Daily Treatment Note      Patient: Meliza Roberts   : 1955  Referring practitioner: GERARDO Shaw*  Date of Initial Visit: Type: THERAPY  Noted: 2021  Today's Date: 2021  Patient seen for 35 sessions               Subjective   Pt arrived and reported her pain at 2/10, \"the same\". \"Saw the doctor yesterday, she heard that click and said we are not there yet,\" presenting a new order to continue therapy.  Objective   See Exercise, Manual, and Modality Logs for complete treatment.   Pt brought a new order for continued therapy.    Assessment/Plan    Visit Diagnoses:    ICD-10-CM ICD-9-CM   1. Acute pain of left shoulder  M25.512 719.41   2. Status post left rotator cuff repair  Z98.890 V45.89   3. Status post rotator cuff repair  Z98.890 V45.89    Advanced resistance on many exercises today, facilitating progression of strength and function.    Progress strengthening /stabilization /functional activity           Timed:  Manual Therapy:         mins  00542;  Therapeutic Exercise:     24    mins  54261;     Neuromuscular Aleida:        mins  92938;    Therapeutic Activity:          mins  94496;     Gait Training:           mins  77550;     Ultrasound:          mins  06321;    Electrical Stimulation:         mins  25147 ( );  Aquatics  __   mins   05799    Untimed:  Electrical Stimulation:         mins  29799 ( );  Mechanical Traction:         mins  07535;     Timed Treatment:   24   mins   Total Treatment:     24   mins  Olimpia Castro PTA  Physical Therapist            "

## 2021-06-28 ENCOUNTER — OFFICE VISIT (OUTPATIENT)
Dept: FAMILY MEDICINE CLINIC | Facility: CLINIC | Age: 66
End: 2021-06-28

## 2021-06-28 VITALS
BODY MASS INDEX: 28.92 KG/M2 | RESPIRATION RATE: 16 BRPM | HEIGHT: 70 IN | OXYGEN SATURATION: 96 % | HEART RATE: 97 BPM | SYSTOLIC BLOOD PRESSURE: 104 MMHG | DIASTOLIC BLOOD PRESSURE: 66 MMHG | TEMPERATURE: 97.2 F | WEIGHT: 202 LBS

## 2021-06-28 DIAGNOSIS — E55.9 VITAMIN D DEFICIENCY: Primary | ICD-10-CM

## 2021-06-28 DIAGNOSIS — Z79.899 MEDICATION MANAGEMENT: ICD-10-CM

## 2021-06-28 DIAGNOSIS — E53.8 B12 DEFICIENCY: ICD-10-CM

## 2021-06-28 PROBLEM — Z88.9 ALLERGIC CONDITION: Status: ACTIVE | Noted: 2021-06-28

## 2021-06-28 PROBLEM — D17.20 LIPOMA OF ARM: Status: ACTIVE | Noted: 2020-06-15

## 2021-06-28 PROBLEM — J30.2 SEASONAL ALLERGIC RHINITIS: Status: ACTIVE | Noted: 2021-06-28

## 2021-06-28 PROBLEM — M19.90 ARTHRITIS: Status: ACTIVE | Noted: 2021-06-28

## 2021-06-28 PROBLEM — Z96.651 HISTORY OF TOTAL KNEE ARTHROPLASTY, RIGHT: Status: ACTIVE | Noted: 2018-05-16

## 2021-06-28 PROBLEM — J34.9 SINUS TROUBLE: Status: ACTIVE | Noted: 2021-06-28

## 2021-06-28 PROBLEM — D64.9 ANEMIA: Status: ACTIVE | Noted: 2021-06-28

## 2021-06-28 PROCEDURE — 1170F FXNL STATUS ASSESSED: CPT | Performed by: NURSE PRACTITIONER

## 2021-06-28 PROCEDURE — 1125F AMNT PAIN NOTED PAIN PRSNT: CPT | Performed by: NURSE PRACTITIONER

## 2021-06-28 PROCEDURE — 96160 PT-FOCUSED HLTH RISK ASSMT: CPT | Performed by: NURSE PRACTITIONER

## 2021-06-28 PROCEDURE — G0439 PPPS, SUBSEQ VISIT: HCPCS | Performed by: NURSE PRACTITIONER

## 2021-06-28 PROCEDURE — 99213 OFFICE O/P EST LOW 20 MIN: CPT | Performed by: NURSE PRACTITIONER

## 2021-06-28 NOTE — PROGRESS NOTES
The ABCs of the Annual Wellness Visit  Subsequent Medicare Wellness Visit    Chief Complaint   Patient presents with   • Medicare Wellness-subsequent       Subjective   History of Present Illness:  Meliza Roberts is a 65 y.o. female who presents for a Subsequent Medicare Wellness Visit.    HEALTH RISK ASSESSMENT    Recent Hospitalizations:  No hospitalization(s) within the last year.    Current Medical Providers:  Patient Care Team:  Bishnu Figueroa APRN as PCP - General (Nurse Practitioner)    Smoking Status:  Social History     Tobacco Use   Smoking Status Never Smoker   Smokeless Tobacco Never Used       Alcohol Consumption:  Social History     Substance and Sexual Activity   Alcohol Use Never    Comment: does not drink       Depression Screen:   PHQ-2/PHQ-9 Depression Screening 6/28/2021   Little interest or pleasure in doing things 0   Feeling down, depressed, or hopeless 0   Total Score 0       Fall Risk Screen:  JAN Fall Risk Assessment has not been completed.    Health Habits and Functional and Cognitive Screening:  Functional & Cognitive Status 6/28/2021   Do you have difficulty preparing food and eating? No   Do you have difficulty bathing yourself, getting dressed or grooming yourself? No   Do you have difficulty using the toilet? No   Do you have difficulty moving around from place to place? No   Do you have trouble with steps or getting out of a bed or a chair? Yes   Current Diet Well Balanced Diet   Dental Exam Up to date   Eye Exam Up to date   Exercise (times per week) 4 times per week   Current Exercises Include Walking   Do you need help using the phone?  No   Are you deaf or do you have serious difficulty hearing?  No   Do you need help with transportation? No   Do you need help shopping? No   Do you need help preparing meals?  No   Do you need help with housework?  No   Do you need help with laundry? No   Do you need help taking your medications? No   Do you need help managing money?  No   Do you ever drive or ride in a car without wearing a seat belt? No         Does the patient have evidence of cognitive impairment? No    Asprin use counseling:Does not need ASA (and currently is not on it)    Age-appropriate Screening Schedule:  Refer to the list below for future screening recommendations based on patient's age, sex and/or medical conditions. Orders for these recommended tests are listed in the plan section. The patient has been provided with a written plan.    Health Maintenance   Topic Date Due   • INFLUENZA VACCINE  08/01/2021   • MAMMOGRAM  12/15/2022   • TDAP/TD VACCINES (2 - Td or Tdap) 10/09/2029   • ZOSTER VACCINE  Completed          The following portions of the patient's history were reviewed and updated as appropriate: allergies, current medications, past family history, past medical history, past social history, past surgical history and problem list.    Outpatient Medications Prior to Visit   Medication Sig Dispense Refill   • acetaminophen (Tylenol 8 Hour) 650 MG 8 hr tablet Tylenol Arthritis Pain 650 mg oral tablet extended release take 2 tablets by oral route every 8 hours   Active     • calcium carbonate-cholecalciferol 500-400 MG-UNIT tablet tablet Take  by mouth Daily.     • Diclofenac Sodium (VOLTAREN) 1 % gel gel      • ferrous sulfate 324 (65 Fe) MG tablet delayed-release EC tablet Take 324 mg by mouth Daily With Breakfast.     • HYDROcodone-acetaminophen (NORCO) 5-325 MG per tablet      • ibuprofen (ADVIL,MOTRIN) 200 MG tablet Take 200 mg by mouth Every 6 (Six) Hours As Needed for Mild Pain .     • vitamin B-12 (CYANOCOBALAMIN) 500 MCG tablet Take 500 mcg by mouth Daily.     • vitamin D3 125 MCG (5000 UT) capsule capsule Take 5,000 Units by mouth Daily.       No facility-administered medications prior to visit.       Patient Active Problem List   Diagnosis   • Aftercare following surgery of the musculoskeletal system   • Chronic left shoulder pain   • Allergic condition  "  • Anemia   • Arthritis   • History of total knee arthroplasty, right   • Seasonal allergic rhinitis   • Sinus trouble   • Lipoma of arm   • Vitamin D deficiency   • B12 deficiency       Advanced Care Planning:  ACP discussion was held with the patient during this visit. Patient has an advance directive in EMR which is still valid.     Review of Systems   Constitutional: Negative for activity change, appetite change, chills, fatigue and fever.   HENT: Negative for congestion and hearing loss.    Eyes: Negative for visual disturbance.   Respiratory: Negative for cough, shortness of breath and wheezing.    Cardiovascular: Negative for chest pain, palpitations and leg swelling.   Gastrointestinal: Negative for abdominal pain, constipation, diarrhea, nausea and vomiting.   Musculoskeletal: Negative for arthralgias and myalgias.   Skin: Negative for rash.   Neurological: Negative for dizziness, weakness and numbness.   Psychiatric/Behavioral: Negative for confusion, sleep disturbance and suicidal ideas.       Compared to one year ago, the patient feels her physical health is the same.  Compared to one year ago, the patient feels her mental health is the same.    Reviewed chart for potential of high risk medication in the elderly: yes  Reviewed chart for potential of harmful drug interactions in the elderly:yes    Objective         Vitals:    06/28/21 1306   BP: 104/66   BP Location: Right arm   Patient Position: Sitting   Cuff Size: Adult   Pulse: 97   Resp: 16   Temp: 97.2 °F (36.2 °C)   TempSrc: Infrared   SpO2: 96%   Weight: 91.6 kg (202 lb)   Height: 177.8 cm (70\")   PainSc:   5   PainLoc: Knee       Body mass index is 28.98 kg/m².  Discussed the patient's BMI with her. The BMI is above average; BMI management plan is completed.    Physical Exam  Vitals reviewed.   Constitutional:       Appearance: Normal appearance. She is well-developed.   HENT:      Head: Normocephalic and atraumatic.      Mouth/Throat:      " Pharynx: No oropharyngeal exudate.   Eyes:      Conjunctiva/sclera: Conjunctivae normal.      Pupils: Pupils are equal, round, and reactive to light.   Cardiovascular:      Rate and Rhythm: Normal rate and regular rhythm.      Heart sounds: No murmur heard.   No friction rub. No gallop.    Pulmonary:      Effort: Pulmonary effort is normal.      Breath sounds: Normal breath sounds. No wheezing or rhonchi.   Skin:     General: Skin is warm and dry.   Neurological:      Mental Status: She is alert and oriented to person, place, and time.   Psychiatric:         Mood and Affect: Mood and affect normal.         Behavior: Behavior normal.         Thought Content: Thought content normal.         Judgment: Judgment normal.               Assessment/Plan   Medicare Risks and Personalized Health Plan  CMS Preventative Services Quick Reference  Obesity/Overweight     The above risks/problems have been discussed with the patient.  Pertinent information has been shared with the patient in the After Visit Summary.  Follow up plans and orders are seen below in the Assessment/Plan Section.    Diagnoses and all orders for this visit:    1. Vitamin D deficiency (Primary)  -     Vitamin D 25 hydroxy; Future    2. B12 deficiency  -     Vitamin B12; Future    3. Medication management  -     Comprehensive metabolic panel; Future  -     CBC No Differential; Future  -     Urinalysis With Culture If Indicated -; Future      Follow Up:  Return in about 6 months (around 12/28/2021) for Next scheduled follow up.     An After Visit Summary and PPPS were given to the patient.

## 2021-06-29 ENCOUNTER — TREATMENT (OUTPATIENT)
Dept: PHYSICAL THERAPY | Facility: CLINIC | Age: 66
End: 2021-06-29

## 2021-06-29 DIAGNOSIS — Z98.890 STATUS POST LEFT ROTATOR CUFF REPAIR: ICD-10-CM

## 2021-06-29 DIAGNOSIS — Z98.890 STATUS POST ROTATOR CUFF REPAIR: ICD-10-CM

## 2021-06-29 DIAGNOSIS — M25.512 ACUTE PAIN OF LEFT SHOULDER: Primary | ICD-10-CM

## 2021-06-29 PROCEDURE — 97110 THERAPEUTIC EXERCISES: CPT | Performed by: PHYSICAL THERAPIST

## 2021-06-29 PROCEDURE — 97140 MANUAL THERAPY 1/> REGIONS: CPT | Performed by: PHYSICAL THERAPIST

## 2021-06-29 NOTE — PROGRESS NOTES
Physical Therapy Daily Treatment Note      Patient: Meliza Roberts   : 1955  Referring practitioner: GERARDO Shaw*  Date of Initial Visit: Type: THERAPY  Noted: 2021  Today's Date: 2021  Patient seen for 6 sessions           Subjective Questionnaire:       Subjective Evaluation    History of Present Illness    Subjective comment: Pt reported L shoulder pain is 2/10 today but was sore last night.  Pt is on her way to work.Quality of life: good    Pain  Current pain ratin           Objective   See Exercise, Manual, and Modality Logs for complete treatment.       Assessment:   Pt tolerated treatment well and is progressing with strength and ROM.  Visit Diagnoses:    ICD-10-CM ICD-9-CM   1. Acute pain of left shoulder  M25.512 719.41   2. Status post left rotator cuff repair  Z98.890 V45.89   3. Status post rotator cuff repair  Z98.890 V45.89       Progress per Plan of Care           Timed:  Manual Therapy:    8     mins  85121;  Therapeutic Exercise:    21     mins  71805;     Neuromuscular Aleida:        mins  25047;    Therapeutic Activity:          mins  91324;     Gait Training:           mins  83232;     Ultrasound:          mins  00141;    Electrical Stimulation:         mins  05277 ( );    Untimed:  Electrical Stimulation:         mins  77760 ( );  Mechanical Traction:         mins  73996;     Timed Treatment:   29   mins   Total Treatment:     29   mins  Nayeli Rizo PTA  Physical Therapist

## 2021-07-01 ENCOUNTER — TREATMENT (OUTPATIENT)
Dept: PHYSICAL THERAPY | Facility: CLINIC | Age: 66
End: 2021-07-01

## 2021-07-01 DIAGNOSIS — Z98.890 STATUS POST ROTATOR CUFF REPAIR: ICD-10-CM

## 2021-07-01 DIAGNOSIS — Z98.890 STATUS POST LEFT ROTATOR CUFF REPAIR: ICD-10-CM

## 2021-07-01 DIAGNOSIS — M25.512 ACUTE PAIN OF LEFT SHOULDER: Primary | ICD-10-CM

## 2021-07-01 PROCEDURE — 97110 THERAPEUTIC EXERCISES: CPT | Performed by: PHYSICAL THERAPIST

## 2021-07-01 NOTE — PROGRESS NOTES
Physical Therapy Daily Treatment Note      Patient: Meliza Roberts   : 1955  Referring practitioner: GERARDO Shaw*  Date of Initial Visit: Type: THERAPY  Noted: 2021  Today's Date: 2021  Patient seen for 7 sessions           Subjective Questionnaire:     Subjective Evaluation    History of Present Illness    Subjective comment: Pt reports with Pain  Current pain ratin           Objective           General Comments     Shoulder Comments   Pt exhibits L shoulder weakness and is limited in lifting anything heavier than 5lbs and lifting overhead.  Pt is on restricted duty at work.     See Exercise, Manual, and Modality Logs for complete treatment.       Assessment & Plan     Assessment  Assessment details: Pt reports popping with getting started with L shoulder adduction with band.  Pt is progressing still limited in L shoulder weakness.  Pt's range of motion is still limited and painful at end range flexion, ABD and external rotation.  Continue with POC to return pt to PLOF.        Visit Diagnoses:    ICD-10-CM ICD-9-CM   1. Acute pain of left shoulder  M25.512 719.41   2. Status post left rotator cuff repair  Z98.890 V45.89   3. Status post rotator cuff repair  Z98.890 V45.89       Progress per Plan of Care and Progress strengthening /stabilization /functional activity           Timed:  Manual Therapy:    5     mins  46511;  Therapeutic Exercise:    22     mins  10905;     Neuromuscular Aleida:        mins  25423;    Therapeutic Activity:          mins  67160;     Gait Training:           mins  79382;     Ultrasound:          mins  29331;    Electrical Stimulation:         mins  33044 ( );    Untimed:  Electrical Stimulation:         mins  61508 ( );  Mechanical Traction:         mins  40682;     Timed Treatment:   27   mins   Total Treatment:     27   mins  Nayeli Rizo PTA  Physical Therapist

## 2021-07-06 ENCOUNTER — TREATMENT (OUTPATIENT)
Dept: PHYSICAL THERAPY | Facility: CLINIC | Age: 66
End: 2021-07-06

## 2021-07-06 DIAGNOSIS — Z98.890 STATUS POST ROTATOR CUFF REPAIR: ICD-10-CM

## 2021-07-06 DIAGNOSIS — M25.512 ACUTE PAIN OF LEFT SHOULDER: Primary | ICD-10-CM

## 2021-07-06 DIAGNOSIS — Z98.890 STATUS POST LEFT ROTATOR CUFF REPAIR: ICD-10-CM

## 2021-07-06 PROCEDURE — 97110 THERAPEUTIC EXERCISES: CPT | Performed by: PHYSICAL THERAPIST

## 2021-07-06 NOTE — PROGRESS NOTES
Physical Therapy Daily Treatment Note      Patient: Meliza Roberts   : 1955  Referring practitioner: GERARDO Shaw*  Date of Initial Visit: Type: THERAPY  Noted: 2021  Today's Date: 2021  Patient seen for 36 sessions               Subjective   Pt reported that she is scheduled to return to work .  Objective   See Exercise, Manual, and Modality Logs for complete treatment.       Assessment/Plan  Pt is continuing to progress with strength as evident with increased resistance during program. VC and tactile prompts for posture and position of shoulder during exercises. Pain reported at shoulder cap L, rating 4./10 during exercise. Strengthening remains priority.    Visit Diagnoses:    ICD-10-CM ICD-9-CM   1. Acute pain of left shoulder  M25.512 719.41   2. Status post left rotator cuff repair  Z98.890 V45.89   3. Status post rotator cuff repair  Z98.890 V45.89       Progress strengthening /stabilization /functional activity           Timed:  Manual Therapy:         mins  19583;  Therapeutic Exercise:    30     mins  02889;     Neuromuscular Aleida:        mins  72728;    Therapeutic Activity:          mins  53623;     Gait Training:           mins  85148;     Ultrasound:          mins  27757;    Electrical Stimulation:         mins  84535 ( );  Aquatics  __   mins   91852    Untimed:  Electrical Stimulation:         mins  41810 ( );  Mechanical Traction:         mins  47521;     Timed Treatment:   30   mins   Total Treatment:     30   mins  Olimpia Castro PTA  Physical Therapist

## 2021-07-08 ENCOUNTER — TREATMENT (OUTPATIENT)
Dept: PHYSICAL THERAPY | Facility: CLINIC | Age: 66
End: 2021-07-08

## 2021-07-08 DIAGNOSIS — M25.512 ACUTE PAIN OF LEFT SHOULDER: Primary | ICD-10-CM

## 2021-07-08 DIAGNOSIS — Z98.890 STATUS POST LEFT ROTATOR CUFF REPAIR: ICD-10-CM

## 2021-07-08 DIAGNOSIS — Z98.890 STATUS POST ROTATOR CUFF REPAIR: ICD-10-CM

## 2021-07-08 PROCEDURE — 97110 THERAPEUTIC EXERCISES: CPT | Performed by: PHYSICAL THERAPIST

## 2021-07-08 NOTE — PROGRESS NOTES
Re-Assessment / Re-Certification      Patient: Meliza Roberts   : 1955  Diagnosis/ICD-10 Code:  Acute pain of left shoulder [M25.512]  Referring practitioner: GERARDO Shaw*  Date of Initial Visit: Type: THERAPY  Noted: 2021  Today's Date: 2021  Patient seen for 8 sessions      Subjective:   Meliza Roberts reports: left shoulder pain 5/10.  Subjective Questionnaire: QuickDASH: 24=20-39% limitation  Clinical Progress: improved  Home Program Compliance: Yes  Treatment has included: therapeutic exercise    Subjective   Objective          Active Range of Motion     Additional Active Range of Motion Details  Shoulder  Location:  Left  Flexion Active:  160  Flexion Passive:  160  Abduction Active:  160  Internal Rotation Passive:  70  External Rotation Passive:  70     Strength/Myotome Testing     Left Shoulder     Planes of Motion   Flexion: 3+   Extension: 4   Abduction: 3+   External rotation at 0°: 3+   Internal rotation at 0°: 4       Assessment & Plan     Assessment  Impairments: activity intolerance, impaired physical strength and pain with function  Prognosis: good  Functional Limitations: carrying objects, lifting, pulling, pushing, uncomfortable because of pain, reaching overhead and unable to perform repetitive tasks  Goals  Plan Goals: Plan Goals: SHOULDER  PROBLEMS:  1. The patient has limited ROM of the left shoulder.                LTG 1: 8 weeks:  The patient will demonstrate 160 degrees of left shoulder flexion, 160 degrees of shoulder abduction, 70 degrees of shoulder external rotation, and 70 degrees of shoulder internal rotation to allow the patient to reach into upper kitchen cabinets and manipulate clothing behind the back with greater ease.                          STATUS: met               STG 1a: 4 weeks:  The patient will demonstrate 120 degrees of left shoulder flexion, 120 degrees of shoulder abduction, 60 degrees of shoulder external rotation, and 60  degrees of shoulder internal rotation.                          STATUS:  Met              TREATMENT: Manual therapy, therapeutic exercise, home exercise instruction, and modalities as needed to include: electrical stimulation, ultrasound, moist heat, and ice.    2. The patient has limited strength of the left shoulder.              LTG 2: 8 weeks:  The patient will demonstrate 4/5 strength for left shoulder flexion, abduction, external rotation, and internal rotation in order to demonstrate improved shoulder stability.                          STATUS: progressing              STG 2a: 4 weeks:  The patient will demonstrate 3/5 strength for left shoulder flexion, abduction, external rotation, and internal rotation.                          STATUS: Met              STG2b:  2 weeks:  The patient will be independent with home exercises.                           STATUS:  Met and progressing as appropriate              TREATMENT: Manual therapy, therapeutic exercise, home exercise instruction, and modalities as needed to include: electrical stimulation, moist heat, and ice.                3. The patient complains of pain to the left shoulder.              LTG 3: 8 weeks:  The patient will report a pain rating of 2/10 or better in order to improve sleep quality and tolerance to performance of activities of daily living.                          STATUS:  ongoing              STG 3a: 4 weeks:  The patient will report a pain rating of 4/10 or better.                           STATUS:  Met               TREATMENT: Manual therapy, therapeutic exercise, home exercise instruction, and modalities as needed to include: electrical stimulation, moist heat, and ice.    4. Carrying, Moving, and Handling Objects Functional Limitation                               LTG 4: 8 weeks:  The patient will demonstrate 1-19% limitation by achieving a score of 19 on the Quick Dash.                          STATUS:  progressing              STG 4a:  4 weeks:  The patient will demonstrate 40-59% limitation by achieving a score of 36 on the Quick Dash.                            STATUS:  Met              TREATMENT:  Manual therapy, therapeutic exercise, home exercise instruction,    Plan  Therapy options: will be seen for skilled physical therapy services  Planned modality interventions: cryotherapy and TENS  Planned therapy interventions: strengthening, home exercise program and manual therapy  Frequency: 2x week  Duration in weeks: 4  Treatment plan discussed with: patient        Visit Diagnoses:    ICD-10-CM ICD-9-CM   1. Acute pain of left shoulder  M25.512 719.41   2. Status post left rotator cuff repair  Z98.890 V45.89   3. Status post rotator cuff repair  Z98.890 V45.89       Progress toward previous goals: Partially Met          Recommendations: Continue as planned  Timeframe: 1 month  Prognosis to achieve goals: good    PT Signature: Carlota Sanchez PT      Based upon review of the patient's progress and continued therapy plan, it is my medical opinion that Meliza Roberts should continue physical therapy treatment at [facility]  [address] [place] [address]  [place] [address] [phone].    Signature: __________________________________  Galindo Lowe PA-C    Timed:  Manual Therapy:         mins  63423;  Therapeutic Exercise:    27     mins  69700;     Neuromuscular Aleida:        mins  91970;    Therapeutic Activity:          mins  17079;     Gait Training:           mins  95431;     Ultrasound:          mins  10057;    Electrical Stimulation:         mins  20798 ( );    Untimed:  Electrical Stimulation:         mins  25201 ( );  Mechanical Traction:         mins  61635;     Timed Treatment:   27   mins   Total Treatment:     27   mins

## 2021-07-13 ENCOUNTER — TREATMENT (OUTPATIENT)
Dept: PHYSICAL THERAPY | Facility: CLINIC | Age: 66
End: 2021-07-13

## 2021-07-13 DIAGNOSIS — Z98.890 STATUS POST LEFT ROTATOR CUFF REPAIR: ICD-10-CM

## 2021-07-13 DIAGNOSIS — M25.512 ACUTE PAIN OF LEFT SHOULDER: Primary | ICD-10-CM

## 2021-07-13 DIAGNOSIS — Z98.890 STATUS POST ROTATOR CUFF REPAIR: ICD-10-CM

## 2021-07-13 PROCEDURE — 97110 THERAPEUTIC EXERCISES: CPT | Performed by: PHYSICAL THERAPIST

## 2021-07-13 NOTE — PROGRESS NOTES
"Physical Therapy Daily Treatment Note      Patient: Meliza Roberts   : 1955  Referring practitioner: GERARDO Shaw*  Date of Initial Visit: Type: THERAPY  Noted: 2021  Today's Date: 2021  Patient seen for 10 sessions         Meliza Roberts reports: \"My shoulder is good, my feet are hurting though, had to leave work yesterday.\"  Informed pt that we only have 2 additional visit approved by insurance.         Objective   See Exercise, Manual, and Modality Logs for complete treatment.   Dispensed blue and purple bands for her HEP. She denies questions about HEP.    Assessment/Plan    Visit Diagnoses:    ICD-10-CM ICD-9-CM   1. Acute pain of left shoulder  M25.512 719.41   2. Status post left rotator cuff repair  Z98.890 V45.89   3. Status post rotator cuff repair  Z98.890 V45.89       Progress strengthening /stabilization /functional activity           Timed:  Manual Therapy:         mins  93336;  Therapeutic Exercise:    30     mins  93717;     Neuromuscular Aleida:        mins  57608;    Therapeutic Activity:          mins  20441;     Gait Training:           mins  43855;     Ultrasound:          mins  88007;    Electrical Stimulation:         mins  90775 ( );  Aquatics  __   mins   03497    Untimed:  Electrical Stimulation:         mins  06042 ( );  Mechanical Traction:         mins  42851;     Timed Treatment:   30   mins   Total Treatment:     30   mins  Olimpia Castro PTA  Physical Therapist              "

## 2021-07-15 VITALS — HEART RATE: 79 BPM | OXYGEN SATURATION: 97 % | HEIGHT: 70 IN | BODY MASS INDEX: 28.63 KG/M2 | WEIGHT: 200 LBS

## 2021-07-20 ENCOUNTER — TREATMENT (OUTPATIENT)
Dept: PHYSICAL THERAPY | Facility: CLINIC | Age: 66
End: 2021-07-20

## 2021-07-20 DIAGNOSIS — Z98.890 STATUS POST ROTATOR CUFF REPAIR: ICD-10-CM

## 2021-07-20 DIAGNOSIS — Z98.890 STATUS POST LEFT ROTATOR CUFF REPAIR: ICD-10-CM

## 2021-07-20 DIAGNOSIS — M25.512 ACUTE PAIN OF LEFT SHOULDER: Primary | ICD-10-CM

## 2021-07-20 PROCEDURE — 97110 THERAPEUTIC EXERCISES: CPT | Performed by: PHYSICAL THERAPIST

## 2021-07-20 NOTE — PROGRESS NOTES
Physical Therapy Daily Treatment Note      Patient: Meliza Roberts   : 1955  Referring practitioner: No ref. provider found  Date of Initial Visit: Type: THERAPY  Noted: 2021  Today's Date: 2021  Patient seen for 40 sessions         Meliza Roberts reports: left shoulder 2/10    Subjective     Objective   See Exercise, Manual, and Modality Logs for complete treatment.       Assessment & Plan     Assessment  Prognosis details: Patient continues to progress toward goals; probable discharge next session.        Visit Diagnoses:    ICD-10-CM ICD-9-CM   1. Acute pain of left shoulder  M25.512 719.41   2. Status post left rotator cuff repair  Z98.890 V45.89   3. Status post rotator cuff repair  Z98.890 V45.89       Progress per Plan of Care and Anticipate DC next Visit           Timed:  Manual Therapy:         mins  07589;  Therapeutic Exercise:    26     mins  77661;     Neuromuscular Aleida:        mins  27863;    Therapeutic Activity:          mins  64739;     Gait Training:           mins  05454;     Ultrasound:          mins  80652;    Electrical Stimulation:         mins  92606 ( );    Untimed:  Electrical Stimulation:         mins  24049 ( );  Mechanical Traction:         mins  58450;     Timed Treatment:   26   mins   Total Treatment:     26   mins  Carlota Sanchez PT  Physical Therapist

## 2021-07-28 ENCOUNTER — TREATMENT (OUTPATIENT)
Dept: PHYSICAL THERAPY | Facility: CLINIC | Age: 66
End: 2021-07-28

## 2021-07-28 DIAGNOSIS — Z98.890 STATUS POST ROTATOR CUFF REPAIR: ICD-10-CM

## 2021-07-28 DIAGNOSIS — Z98.890 STATUS POST LEFT ROTATOR CUFF REPAIR: ICD-10-CM

## 2021-07-28 DIAGNOSIS — M25.512 ACUTE PAIN OF LEFT SHOULDER: Primary | ICD-10-CM

## 2021-07-28 PROCEDURE — 97110 THERAPEUTIC EXERCISES: CPT | Performed by: PHYSICAL THERAPIST

## 2021-07-28 NOTE — PROGRESS NOTES
Re-Assessment / Discharge Summary      Patient: Meliza Roberts   : 1955  Diagnosis/ICD-10 Code:  Acute pain of left shoulder [M25.512]  Referring practitioner: No ref. provider found  Date of Initial Visit: Type: THERAPY  Noted: 2021  Today's Date: 2021  Patient seen for 12 sessions      Subjective:   Meliza Roberts reports: left shoulder pain 2/10.  Subjective Questionnaire: QuickDASH: 24=20-39% limitation  Clinical Progress: improved  Home Program Compliance: Yes  Treatment has included: therapeutic exercise and manual therapy    Subjective   Objective          Active Range of Motion     Additional Active Range of Motion Details  Shoulder  Location: Left  Flexion Active:  160  Flexion Passive:  160  Abduction Active:  160  Internal Rotation Passive:  70  External Rotation Passive:  70        Strength/Myotome Testing     Left Shoulder     Planes of Motion   Flexion: 4   Extension: 4+   Abduction: 4   External rotation at 0°: 4   Internal rotation at 0°: 4+       Assessment & Plan       Goals  Plan Goals: 1. The patient has limited ROM of the left shoulder.                LTG 1: 8 weeks:  The patient will demonstrate 160 degrees of left shoulder flexion, 160 degrees of shoulder abduction, 70 degrees of shoulder external rotation, and 70 degrees of shoulder internal rotation to allow the patient to reach into upper kitchen cabinets and manipulate clothing behind the back with greater ease.                          STATUS: met               STG 1a: 4 weeks:  The patient will demonstrate 120 degrees of left shoulder flexion, 120 degrees of shoulder abduction, 60 degrees of shoulder external rotation, and 60 degrees of shoulder internal rotation.                          STATUS:  Met              TREATMENT: Manual therapy, therapeutic exercise, home exercise instruction, and modalities as needed to include: electrical stimulation, ultrasound, moist heat, and ice.    2. The patient has  limited strength of the left shoulder.              LTG 2: 8 weeks:  The patient will demonstrate 4/5 strength for left shoulder flexion, abduction, external rotation, and internal rotation in order to demonstrate improved shoulder stability.                          STATUS: Met              STG 2a: 4 weeks:  The patient will demonstrate 3/5 strength for left shoulder flexion, abduction, external rotation, and internal rotation.                          STATUS: Met              STG2b:  2 weeks:  The patient will be independent with home exercises.                           STATUS:  Met               TREATMENT: Manual therapy, therapeutic exercise, home exercise instruction, and modalities as needed to include: electrical stimulation, moist heat, and ice.                3. The patient complains of pain to the left shoulder.              LTG 3: 8 weeks:  The patient will report a pain rating of 2/10 or better in order to improve sleep quality and tolerance to performance of activities of daily living.                          STATUS:  Met              STG 3a: 4 weeks:  The patient will report a pain rating of 4/10 or better.                           STATUS:  Met               TREATMENT: Manual therapy, therapeutic exercise, home exercise instruction, and modalities as needed to include: electrical stimulation, moist heat, and ice.    4. Carrying, Moving, and Handling Objects Functional Limitation                               LTG 4: 8 weeks:  The patient will demonstrate 1-19% limitation by achieving a score of 19 on the Quick Dash.                          STATUS:  Not met              STG 4a: 4 weeks:  The patient will demonstrate 40-59% limitation by achieving a score of 36 on the Quick Dash.                            STATUS:  Met              TREATMENT:  Manual therapy, therapeutic exercise, home exercise instruction,    Plan  Therapy options: will not be seen for skilled physical therapy services  Treatment  plan discussed with: patient  Plan details: Patient to continue with home exercise program and has follow up with MD next week.        Visit Diagnoses:    ICD-10-CM ICD-9-CM   1. Acute pain of left shoulder  M25.512 719.41   2. Status post left rotator cuff repair  Z98.890 V45.89   3. Status post rotator cuff repair  Z98.890 V45.89       Progress toward previous goals: all met except for subjective questionnaire    Recommendations: Discharge  Prognosis to achieve goals: good    PT Signature: Carlota Sanchez PT      Based upon review of the patient's progress and continued therapy plan, it is my medical opinion that Meliza Roberts should continue physical therapy treatment at [facility]  [address] [facility] [place] [address] [phone].    Signature: __________________________________      Timed:  Manual Therapy:         mins  46143;  Therapeutic Exercise:    26     mins  16609;     Neuromuscular Aleida:        mins  10788;    Therapeutic Activity:          mins  64580;     Gait Training:           mins  44321;     Ultrasound:          mins  10749;    Electrical Stimulation:         mins  09935 ( );    Untimed:  Electrical Stimulation:         mins  05459 ( );  Mechanical Traction:         mins  57054;     Timed Treatment:   26   mins   Total Treatment:     26   mins

## 2021-08-04 ENCOUNTER — OFFICE VISIT (OUTPATIENT)
Dept: ORTHOPEDIC SURGERY | Facility: CLINIC | Age: 66
End: 2021-08-04

## 2021-08-04 VITALS — OXYGEN SATURATION: 98 % | HEART RATE: 90 BPM | BODY MASS INDEX: 28.17 KG/M2 | HEIGHT: 71 IN | WEIGHT: 201.2 LBS

## 2021-08-04 DIAGNOSIS — Z47.89 AFTERCARE FOLLOWING SURGERY OF THE MUSCULOSKELETAL SYSTEM: Primary | ICD-10-CM

## 2021-08-04 PROCEDURE — 99212 OFFICE O/P EST SF 10 MIN: CPT | Performed by: PHYSICIAN ASSISTANT

## 2021-08-04 NOTE — PROGRESS NOTES
"Chief Complaint  Pain of the Left Shoulder    Subjective          Meliza Roberts presents to [facility] for follow-up on left shoulder status post left shoulder arthroscopy performed by Dr. Wang 1/7/2021.  Patient completed physical therapy 7/26/2021.  She states that she is very happy with her range of motion and will continue home exercises to work on strengthening.  She is currently working at [employer] and is planning to get a new job where she states she will need to sit at a desk and be on a phone/computer majority of the day.  She states this job will not require any heavy lifting, climbing, pushing or pulling.    Objective   Vital Signs:   Pulse 90   Ht 180.3 cm (71\")   Wt 91.3 kg (201 lb 3.2 oz)   SpO2 98%   BMI 28.06 kg/m²       Physical Exam  Constitutional:       Appearance: Normal appearance. He is well-developed and normal weight.   HENT:      Head: Normocephalic.      Right Ear: Hearing and external ear normal.      Left Ear: Hearing and external ear normal.      Nose: Nose normal.   Eyes:      Conjunctiva/sclera: Conjunctivae normal.   Cardiovascular:      Rate and Rhythm: Normal rate.   Pulmonary:      Effort: Pulmonary effort is normal.      Breath sounds: No wheezing or rales.   Abdominal:      Palpations: Abdomen is soft.      Tenderness: There is no abdominal tenderness.   Musculoskeletal:      Cervical back: Normal range of motion.   Skin:     Findings: No rash.   Neurological:      Mental Status: He is alert and oriented to person, place, and time.   Psychiatric:         Mood and Affect: Mood and affect normal.         Judgment: Judgment normal.     Ortho Exam  Left shoulder: Well-healed surgical incisions, no tenderness to palpation, forward flexion to 170, abduction to 160, internal rotation to T12.  Strength 4 out of 5 in the left upper extremity with resisted shoulder flexion and extension compared to 5 out of 5 in the right upper extremity.  " Sensation to light touch intact, radial pulses 2+, good range of motion left elbow wrist and digits.  Result Review :       \plain     Imaging Results (Most Recent)     None                Assessment and Plan    Problem List Items Addressed This Visit        Musculoskeletal and Injuries    Aftercare following surgery of the musculoskeletal system - Primary    Current Assessment & Plan     Patient has completed physical therapy, she plans to continue home exercises to work on strengthening of the left shoulder.  Patient will follow up on an as-needed basis.               Follow Up   Return if symptoms worsen or fail to improve.  Patient Instructions   Patient has completed physical therapy, she plans to continue home exercises to work on strengthening of the left shoulder.  Patient will follow up on an as-needed basis.    Patient was given instructions and counseling regarding her condition or for health maintenance advice. Please see specific information pulled into the AVS if appropriate.

## 2021-08-04 NOTE — PATIENT INSTRUCTIONS
Patient has completed physical therapy, she plans to continue home exercises to work on strengthening of the left shoulder.  Patient will follow up on an as-needed basis.

## 2021-08-05 ENCOUNTER — OFFICE VISIT (OUTPATIENT)
Dept: FAMILY MEDICINE CLINIC | Facility: CLINIC | Age: 66
End: 2021-08-05

## 2021-08-05 VITALS
BODY MASS INDEX: 28.14 KG/M2 | WEIGHT: 201 LBS | HEIGHT: 71 IN | RESPIRATION RATE: 16 BRPM | SYSTOLIC BLOOD PRESSURE: 118 MMHG | DIASTOLIC BLOOD PRESSURE: 74 MMHG | HEART RATE: 95 BPM | TEMPERATURE: 97.7 F | OXYGEN SATURATION: 93 %

## 2021-08-05 DIAGNOSIS — M25.50 POLYARTHRALGIA: Primary | ICD-10-CM

## 2021-08-05 PROCEDURE — 99212 OFFICE O/P EST SF 10 MIN: CPT | Performed by: NURSE PRACTITIONER

## 2021-08-05 NOTE — PROGRESS NOTES
Chief Complaint  Constipation    Subjective        Meliza Roberts presents to [facility]  Here for paperwork to  Be employed on "[employer].  Have chronic knee pain with surgical repairs.  Arthritis throughout body and repaired rotator cuff on left shoulder.        Past History:    Medical History: has a past medical history of Aftercare following right knee joint replacement surgery (01/29/2018), Allergic conjunctivitis and rhinitis, Anemia, Arthritis, Hepatitis, History of total knee arthroplasty, right (05/16/2018), Limb swelling, Lipoma of arm (06/15/2020), Seasonal allergies, and Sinus trouble.     Surgical History: has a past surgical history that includes Other surgical history; Colonoscopy (06/2017); Breast mass excision (06/06/1997); Hysterectomy (2006); Ankle surgery; Anterior cruciate ligament repair (Bilateral, 1989/1991/2010); Tubal ligation (09/14/1993); and Rotator cuff repair (Left, 01/27/2021).     Family History: family history includes Arthritis in her brother and mother; Cancer in her mother; Diabetes in her brother, father, and sister; Heart disease in her father, maternal grandmother, and mother; Multiple myeloma (age of onset: 23) in her brother; Stroke in her father.     Social History: reports that she has never smoked. She has never used smokeless tobacco. She reports that she does not drink alcohol and does not use drugs.    Allergies: Sulfa antibiotics          Current Outpatient Medications:   •  acetaminophen (Tylenol 8 Hour) 650 MG 8 hr tablet, Tylenol Arthritis Pain 650 mg oral tablet extended release take 2 tablets by oral route every 8 hours   Active, Disp: , Rfl:   •  calcium carbonate-cholecalciferol 500-400 MG-UNIT tablet tablet, Take  by mouth Daily., Disp: , Rfl:   •  Diclofenac Sodium (VOLTAREN) 1 % gel gel, , Disp: , Rfl:   •  ferrous sulfate 324 (65 Fe) MG tablet delayed-release EC tablet, Take 324 mg by mouth Daily With Breakfast.,  Hospitalist Consult Note     Patient: Arianna Vigil Date: 8/6/2017   YOB: 1971 Admission Date: 8/5/2017   MRN: [id number] Attending: Neel Cao MD     Reason for Consultation: \"H&P\"  Requesting Physician: Neel Cao MD  Consulting Physician: Radha Hayden MD    Chief Complaint:   Chief Complaint   Patient presents with   • Weakness   • Suicidal        History of Present Illness:  Arianna Vigil is a 45 year old female with a PMH significant for depression & anxiety who presented to the ED w/ SI. Patient reports an ill-defined plan to overdose on her insulin in addition to taking her life w/ a kitchen knife. Patient still reports SI but denies HI. She also denies overt auditory or visual hallucinations but does report difficulty differentiating between her dreams from reality. Patient reports EtOH & marijuana use. Stressors include patient's homelessness (currently living in a garage).  Regarding her other health issues, patient admits that her DM1 has been generally poorly-controlled. She was hospitalized last month for N/V due to DKA. Patient also reports worsening generalized tremors & generalized weakness for the past month which have worsened in the past week.       Past Medical History:   Diagnosis Date   • Anemia    • Anxiety    • Arthritis    • Bronchitis    • Depression    • Diabetes mellitus (CMS/HCC)     Type 1, diagnosed 09/2012   • Fracture    • Hypoglycemia    • Otitis media    • Pneumonia    • Syncope    • Urinary tract infection        History reviewed. No pertinent surgical history.    Prior to Admission medications    Medication Sig Start Date End Date Taking? Authorizing Provider   pantoprazole (PROTONIX) 40 MG tablet Take 1 tablet by mouth 2 times daily before breakfast and at night. 8/2/17 8/2/18 Yes Filiberto Serrano MD   clonazePAM (KLONOPIN) 0.5 MG tablet Take 0.5 tablets by mouth 2 times daily. 7/27/17  Yes Ana Virgen MD   trazodone (DESYREL) 150  "Disp: , Rfl:   •  HYDROcodone-acetaminophen (NORCO) 5-325 MG per tablet, , Disp: , Rfl:   •  ibuprofen (ADVIL,MOTRIN) 200 MG tablet, Take 200 mg by mouth Every 6 (Six) Hours As Needed for Mild Pain ., Disp: , Rfl:   •  vitamin B-12 (CYANOCOBALAMIN) 500 MCG tablet, Take 500 mcg by mouth Daily., Disp: , Rfl:   •  vitamin D3 125 MCG (5000 UT) capsule capsule, Take 5,000 Units by mouth Daily., Disp: , Rfl:     There are no discontinued medications.      Review of Systems   Constitutional: Negative for fever.   Respiratory: Negative for cough and shortness of breath.    Cardiovascular: Negative for chest pain, palpitations and leg swelling.   Neurological: Negative for dizziness, weakness, numbness and headache.        Objective         Vitals:    08/05/21 1502   BP: 118/74   BP Location: Right arm   Patient Position: Sitting   Cuff Size: Large Adult   Pulse: 95   Resp: 16   Temp: 97.7 °F (36.5 °C)   TempSrc: Infrared   SpO2: 93%   Weight: 91.2 kg (201 lb)   Height: 180.3 cm (71\")     Body mass index is 28.03 kg/m².         Physical Exam  Vitals reviewed.   Constitutional:       Appearance: Normal appearance. She is well-developed.   HENT:      Head: Normocephalic and atraumatic.      Mouth/Throat:      Pharynx: No oropharyngeal exudate.   Eyes:      Conjunctiva/sclera: Conjunctivae normal.      Pupils: Pupils are equal, round, and reactive to light.   Cardiovascular:      Rate and Rhythm: Normal rate and regular rhythm.      Heart sounds: No murmur heard.   No friction rub. No gallop.    Pulmonary:      Effort: Pulmonary effort is normal.      Breath sounds: Normal breath sounds. No wheezing or rhonchi.   Skin:     General: Skin is warm and dry.   Neurological:      Mental Status: She is alert and oriented to person, place, and time.   Psychiatric:         Mood and Affect: Mood and affect normal.         Behavior: Behavior normal.         Thought Content: Thought content normal.         Judgment: Judgment normal. " MG tablet Take one and one-half tablets by mouth nightly at bedtime. 7/27/17  Yes Ana Virgen MD   divalproex (DEPAKOTE ER) 500 MG 24 hr ER tablet Take 2 tablets by mouth nightly. 7/20/17  Yes Ana Virgen MD   FLUoxetine (PROZAC) 20 MG capsule Take 3 capsules by mouth daily. 7/20/17  Yes Ana Virgen MD   lamoTRIgine (LAMICTAL) 25 MG tablet Take 2 tablets by mouth 2 times daily. 2 tabs (=50mg) 7/20/17  Yes Ana Virgen MD   insulin lispro (HUMALOG KWIKPEN) 100 UNIT/ML pen-injector Inject 3 times daily before meals per sliding scale: for Blood sugar reading of: 150-200 Inject 1unit, 201-250 Inject 2 units, 251-300 Inject 3 units, 301-350 Inject 4 units, 351-400 Inject 5 U 7/19/17  Yes Davon Culp MD   insulin glargine (LANTUS SOLOSTAR) 100 UNIT/ML pen-injector Inject 35 Units into the skin nightly. 4/8/17  Yes Dilcia Serrano MD   blood glucose (ONE TOUCH ULTRA TEST) test strip USE TO TEST BLOOD SUGAR THREE TIMES DAILY 7/27/17 7/27/18  Ana Virgen MD   acetaminophen (TYLENOL) 500 MG tablet Take 2 tablets by mouth 3 times daily as needed for Pain. 7/19/17   Ana Virgen MD   nicotine (NICODERM) 21 MG/24HR patch Place 1 patch onto the skin daily. 7/19/17   Ana Virgen MD   Insulin Pen Needle (B-D U/F PEN NEEDLE 5/16\") 31G X 8 MM Misc Use as directed to inject insulin 4 times daily 4/8/17 4/8/18  Ana Virgen MD   ketoconazole (NIZORAL) 2 % shampoo Apply to scalp daily as needed for itching. 5/15/17   Sravani Epperson MD   triamcinolone (ARISTOCORT) 0.1 % ointment Apply topically 2 times daily as needed (as needed for itching scalp). 5/15/17   Sravani Epperson MD   ibuprofen (MOTRIN) 600 MG tablet Take 1 tablet by mouth every 8 hours as needed for Pain. 12/1/16   Ana Virgen MD       ALLERGIES:  No Known Allergies    Code Status: Full code    Primary MD: Ana Virgen MD    Family History   Problem Relation Age of Onset   • Diabetes Father    •              Result Review :               Assessment and Plan     Diagnoses and all orders for this visit:    1. Polyarthralgia (Primary)    continue physical therapy    Letter for accommodations for new job      Follow Up     Return in about 6 months (around 2/5/2022) for Next scheduled follow up.    Patient was given instructions and counseling regarding her condition or for health maintenance advice. Please see specific information pulled into the AVS if appropriate.          Diabetes Paternal Uncle    • Diabetes Maternal Grandmother    • Cancer Maternal Grandmother      Metastatic, unsure of primary source   • Emphysema Paternal Grandmother    • Cancer Maternal Aunt      Unspecified type   • Diabetes Daughter      type 1   • Diabetes Daughter      type 1     Social  Social History   Substance Use Topics   • Smoking status: Current Every Day Smoker     Packs/day: 0.25     Years: 26.00     Types: Cigarettes   • Smokeless tobacco: Never Used      Comment: smokes approx 3-4 cig./day   • Alcohol use 1.8 oz/week     3 Glasses of wine per week      Comment: occasionaly      Social History     Social History Narrative   • No narrative on file       REVIEW OF SYSTEMS    Gen: Denies HA, syncope, fever, chills, fatigue, weight loss/gain  HEENT: Denies vision changes, hearing changes, nasal discharge, dysphagia  Resp: Denies cough, SOB, hemoptysis & wheezing  CV: Denies CP, palpitations, SHEPARD, orthopnea, lower extremity edema  GI: Denies n/v/d/c, abdominal pain & change in appetite  : Denies dysuria, frequency, nocturia, hematuria & incontinence  MSK: Denies myalgia, arthralgia, weakness, joint swelling & joint erythema  Skin: Denies rashes, itching & jaundice  Endo: Reports increased appetite. Denies heat or cold intolerance & excessive thirst  Neuro: Reports generalized tremors. Denies LOC, seizures, sensory deficits   Psych: Per HPI    PHYSICAL EXAM  Vital Signs (most recent):   Visit Vitals  /65 (Patient Position: Sitting)   Pulse 79   Temp 98.2 °F (36.8 °C) (Oral)   Resp 14   Ht 5' 1\" (1.549 m)   Wt 48.6 kg   SpO2 95%   BMI 20.24 kg/m²       Gen: NAD, disheveled-appearing female. Patient speaks freely in full sentences.   HEENT: PERRL, no scleral icterus or conjunctival pallor, no nasal discharge, moist mucous membranes, oropharynx without erythema or exudate  Neck: trachea midline, supple  CV: RRR, normal S1/S2, no murmurs, no JVD. Non-displaced PMI.  Resp: Normal rate. No retractions  or increased WOB. CTAB.  GI: +BS, NT/ND, soft, no rebound or guarding, no hepatosplenomegaly  MSK: ROM and motor strength grossly normal. No clubbing, edema, or cyanosis.  Skin: no rashes, jaundice or other lesions  Neuro: EOMI, CN 2-12 grossly intact. No gross motor or sensory deficits  Psych: affect & mood appropriate.  Not actively responding to internal stimuli. The patient is alert, interactive, appropriate.    LABS  Lab Results   Component Value Date/Time    WBC 7.3 08/05/2017 04:10 PM    HGB 12.0 08/05/2017 04:10 PM    HCT 36.4 08/05/2017 04:10 PM     08/05/2017 04:10 PM       Lab Results   Component Value Date/Time    SODIUM 137 08/05/2017 04:10 PM    POTASSIUM 3.7 08/05/2017 04:10 PM    CHLORIDE 99 08/05/2017 04:10 PM    CO2 31 08/05/2017 04:10 PM    BUN 12 08/05/2017 04:10 PM    CREATININE 0.56 08/05/2017 04:10 PM    GLUCOSE 154 (H) 08/05/2017 04:10 PM    CALCIUM 8.9 08/05/2017 04:10 PM    ALBUMIN 2.9 (L) 07/16/2017 04:09 AM    AST 13 07/16/2017 04:09 AM    GPT 21 07/16/2017 04:09 AM    ALKPT 58 07/16/2017 04:09 AM    BILIRUBIN 0.4 07/16/2017 04:09 AM    MG 2.2 07/20/2017 05:20 AM    PHOS 2.5 06/25/2017 08:07 AM         MICROBIOLOGY  Blood Culture  Results for orders placed or performed during the hospital encounter of 07/15/17   Blood Culture   Result Value Ref Range    Specimen Description BLOOD LINE ANTECUBITAL,LEFT     CULTURE NO GROWTH 5 DAYS.     REPORT STATUS 07/20/2017 FINAL      Urine Culture  Results for orders placed or performed during the hospital encounter of 05/13/17   Urine Culture   Result Value Ref Range    Specimen Description URINE, CLEAN CATCH/MIDSTREAM     CULTURE       60,000 ,000 CFU/mL MULTIPLE ORGANISMS ISOLATED WITH NO PREDOMINANT TYPE, CONSISTENT WITH CONTAMINATION. CONSIDER RECOLLECTION.    REPORT STATUS 05/15/2017 FINAL        IMAGING  XR CHEST AP OR PA - PORTABLE   Final Result   IMPRESSION:        Negative AP chest radiograph.      I have reviewed the images and  agree with the Resident's interpretation.         XR Hand 3+ View Right   Final Result      CT Head Brain   Final Result   IMPRESSION:     1. No acute intracranial mass or hemorrhage.   2. Sinus disease.             EKG Interpretation   Results for orders placed or performed during the hospital encounter of 08/05/17   Electrocardiogram 12-Lead   Result Value Ref Range    Systolic Blood Pressure 98     Diastolic Blood Pressure 57     Ventricular Rate EKG/Min (BPM) 70     Atrial Rate (BPM) 70     SC-Interval (MSEC) 156     QRS-Interval (MSEC) 80     QT-Interval (MSEC) 420     QTc 453     P Axis (Degrees) 53     R Axis (Degrees) -17     T Axis (Degrees) 51     REPORT TEXT       Normal sinus rhythm  Low voltage QRS  Borderline ECG  When compared with ECG of  15-JUL-2017 16:59,  Vent. rate  has decreased  BY  54 BPM  Left anterior fascicular block  is no longer  present  Confirmed by GAIL DOTY DO ([id number]),  Deshaun Ocasio ([id number]) on 8/5/2017 10:37:10 PM           ASSESSMENT AND PLAN:  Arianna Vigil is a 45 year old female admitted on 8/5/2017 with SI.    DM1: Poorly-controlled. A1c=11.1%. BG elevated on arrival.  -SSI / Lantus 35U qhs    Tobacco abuse: Nicoderm patches.    Depressive/Anxiety Disorders: with PTSD. No improvement in mood.  -PTA psychotropics (Klonopin, Lamictal, Prozac) per psychiatry      Radha Hayden MD  [facility] Hospitalist  Pager : [phone] / [phone]      Thank you for the opportunity to be involved in this patient's care during their hospitalization. We will continue to follow as needed.

## 2021-08-12 ENCOUNTER — TELEPHONE (OUTPATIENT)
Dept: FAMILY MEDICINE CLINIC | Facility: CLINIC | Age: 66
End: 2021-08-12

## 2021-08-12 NOTE — TELEPHONE ENCOUNTER
Caller: Meliza Trivedi    Relationship: Self    Best call back number: [phone]     What is the best time to reach you: ANY (ARSLAN STATED A VOICEMAIL COULD BE LEFT)    Who are you requesting to speak with (clinical staff, provider,  specific staff member): PROVIDER    What was the call regarding: PATIENT STATED THAT SHE HAD A MEDICAL FORM FILLED OUT BY [employer] FOR HER JOB, BUT SHE STATED THAT THEY NEEDED A LITTLE MORE SPECIFIC INFORMATION ADDED AND SHE WANTED TO CHECK AND SEE IF SHE COULD COME DROP IT OFF AND HAVE IT FILLED BACK OUT. PLEASE CALL BACK TO INFORM    Do you require a callback: YES

## 2021-10-28 ENCOUNTER — TELEPHONE (OUTPATIENT)
Dept: FAMILY MEDICINE CLINIC | Facility: CLINIC | Age: 66
End: 2021-10-28

## 2021-10-28 DIAGNOSIS — Z12.31 ENCOUNTER FOR SCREENING MAMMOGRAM FOR MALIGNANT NEOPLASM OF BREAST: Primary | ICD-10-CM

## 2021-10-28 NOTE — TELEPHONE ENCOUNTER
Caller: Meliza Trivedi    Relationship: Self    Best call back number: [phone] (REQUESTING VM IF UNABLE TO ANSWER)    Who are you requesting to speak with (clinical staff, provider,  specific staff member): CLINICAL      What was the call regarding: PATIENT IS REQUESTING TO KNOW WHEN SHE IS NEXT DUE FOR A MAMMOGRAM     Do you require a callback: YES, PLEASE CALL AND ADVISE

## 2021-12-03 ENCOUNTER — LAB (OUTPATIENT)
Dept: LAB | Facility: HOSPITAL | Age: 66
End: 2021-12-03

## 2021-12-03 DIAGNOSIS — E53.8 B12 DEFICIENCY: ICD-10-CM

## 2021-12-03 DIAGNOSIS — E55.9 VITAMIN D DEFICIENCY: ICD-10-CM

## 2021-12-03 DIAGNOSIS — Z79.899 MEDICATION MANAGEMENT: ICD-10-CM

## 2021-12-03 LAB
25(OH)D3 SERPL-MCNC: 58.6 NG/ML
ALBUMIN SERPL-MCNC: 4.1 G/DL (ref 3.5–5.2)
ALBUMIN/GLOB SERPL: 1 G/DL
ALP SERPL-CCNC: 78 U/L (ref 39–117)
ALT SERPL W P-5'-P-CCNC: 19 U/L (ref 1–33)
ANION GAP SERPL CALCULATED.3IONS-SCNC: 9.7 MMOL/L (ref 5–15)
AST SERPL-CCNC: 17 U/L (ref 1–32)
BACTERIA UR QL AUTO: ABNORMAL /HPF
BILIRUB SERPL-MCNC: 0.2 MG/DL (ref 0–1.2)
BILIRUB UR QL STRIP: NEGATIVE
BUN SERPL-MCNC: 12 MG/DL (ref 8–23)
BUN/CREAT SERPL: 17.4 (ref 7–25)
CALCIUM SPEC-SCNC: 9.7 MG/DL (ref 8.6–10.5)
CHLORIDE SERPL-SCNC: 105 MMOL/L (ref 98–107)
CLARITY UR: CLEAR
CO2 SERPL-SCNC: 27.3 MMOL/L (ref 22–29)
COLOR UR: YELLOW
CREAT SERPL-MCNC: 0.69 MG/DL (ref 0.57–1)
DEPRECATED RDW RBC AUTO: 37.5 FL (ref 37–54)
ERYTHROCYTE [DISTWIDTH] IN BLOOD BY AUTOMATED COUNT: 14.4 % (ref 12.3–15.4)
GFR SERPL CREATININE-BSD FRML MDRD: 104 ML/MIN/1.73
GLOBULIN UR ELPH-MCNC: 4 GM/DL
GLUCOSE SERPL-MCNC: 88 MG/DL (ref 65–99)
GLUCOSE UR STRIP-MCNC: NEGATIVE MG/DL
HCT VFR BLD AUTO: 35.4 % (ref 34–46.6)
HGB BLD-MCNC: 10.8 G/DL (ref 12–15.9)
HGB UR QL STRIP.AUTO: NEGATIVE
HYALINE CASTS UR QL AUTO: ABNORMAL /LPF
KETONES UR QL STRIP: NEGATIVE
LEUKOCYTE ESTERASE UR QL STRIP.AUTO: ABNORMAL
MCH RBC QN AUTO: 22.1 PG (ref 26.6–33)
MCHC RBC AUTO-ENTMCNC: 30.5 G/DL (ref 31.5–35.7)
MCV RBC AUTO: 72.5 FL (ref 79–97)
NITRITE UR QL STRIP: POSITIVE
PH UR STRIP.AUTO: 6 [PH] (ref 5–8)
PLATELET # BLD AUTO: 347 10*3/MM3 (ref 140–450)
PMV BLD AUTO: 10.3 FL (ref 6–12)
POTASSIUM SERPL-SCNC: 4 MMOL/L (ref 3.5–5.2)
PROT SERPL-MCNC: 8.1 G/DL (ref 6–8.5)
PROT UR QL STRIP: NEGATIVE
RBC # BLD AUTO: 4.88 10*6/MM3 (ref 3.77–5.28)
RBC # UR STRIP: ABNORMAL /HPF
REF LAB TEST METHOD: ABNORMAL
SODIUM SERPL-SCNC: 142 MMOL/L (ref 136–145)
SP GR UR STRIP: 1.02 (ref 1–1.03)
SQUAMOUS #/AREA URNS HPF: ABNORMAL /HPF
UROBILINOGEN UR QL STRIP: ABNORMAL
VIT B12 BLD-MCNC: >2000 PG/ML (ref 211–946)
WBC # UR STRIP: ABNORMAL /HPF
WBC NRBC COR # BLD: 6.84 10*3/MM3 (ref 3.4–10.8)

## 2021-12-03 PROCEDURE — 87086 URINE CULTURE/COLONY COUNT: CPT

## 2021-12-03 PROCEDURE — 80053 COMPREHEN METABOLIC PANEL: CPT

## 2021-12-03 PROCEDURE — 85027 COMPLETE CBC AUTOMATED: CPT

## 2021-12-03 PROCEDURE — 36415 COLL VENOUS BLD VENIPUNCTURE: CPT

## 2021-12-03 PROCEDURE — 82607 VITAMIN B-12: CPT

## 2021-12-03 PROCEDURE — 87186 SC STD MICRODIL/AGAR DIL: CPT

## 2021-12-03 PROCEDURE — 87077 CULTURE AEROBIC IDENTIFY: CPT

## 2021-12-03 PROCEDURE — 81001 URINALYSIS AUTO W/SCOPE: CPT

## 2021-12-03 PROCEDURE — 82306 VITAMIN D 25 HYDROXY: CPT

## 2021-12-06 DIAGNOSIS — N30.00 ACUTE CYSTITIS WITHOUT HEMATURIA: Primary | ICD-10-CM

## 2021-12-06 LAB — BACTERIA SPEC AEROBE CULT: ABNORMAL

## 2021-12-06 RX ORDER — CEFDINIR 300 MG/1
300 CAPSULE ORAL 2 TIMES DAILY
Qty: 14 CAPSULE | Refills: 0 | Status: SHIPPED | OUTPATIENT
Start: 2021-12-06 | End: 2021-12-09

## 2021-12-09 ENCOUNTER — OFFICE VISIT (OUTPATIENT)
Dept: FAMILY MEDICINE CLINIC | Facility: CLINIC | Age: 66
End: 2021-12-09

## 2021-12-09 VITALS
HEIGHT: 71 IN | DIASTOLIC BLOOD PRESSURE: 70 MMHG | HEART RATE: 97 BPM | TEMPERATURE: 98 F | WEIGHT: 200.3 LBS | OXYGEN SATURATION: 97 % | RESPIRATION RATE: 16 BRPM | SYSTOLIC BLOOD PRESSURE: 130 MMHG | BODY MASS INDEX: 28.04 KG/M2

## 2021-12-09 DIAGNOSIS — Z51.81 MEDICATION MONITORING ENCOUNTER: ICD-10-CM

## 2021-12-09 DIAGNOSIS — Z11.59 NEED FOR HEPATITIS C SCREENING TEST: Primary | ICD-10-CM

## 2021-12-09 DIAGNOSIS — D50.9 IRON DEFICIENCY ANEMIA, UNSPECIFIED IRON DEFICIENCY ANEMIA TYPE: ICD-10-CM

## 2021-12-09 DIAGNOSIS — J30.2 SEASONAL ALLERGIC RHINITIS, UNSPECIFIED TRIGGER: ICD-10-CM

## 2021-12-09 DIAGNOSIS — E53.8 B12 DEFICIENCY: ICD-10-CM

## 2021-12-09 DIAGNOSIS — E55.9 VITAMIN D DEFICIENCY: ICD-10-CM

## 2021-12-09 DIAGNOSIS — M19.90 ARTHRITIS: ICD-10-CM

## 2021-12-09 PROCEDURE — 99213 OFFICE O/P EST LOW 20 MIN: CPT | Performed by: NURSE PRACTITIONER

## 2021-12-09 NOTE — PROGRESS NOTES
Chief Complaint  Annual Exam    Subjective        Meliza Roberts presents to [facility]  b12 deficiency:  Doing well on medication. And reviewed labs.    Anemia:  Doing well and will continue iron since hemoglobin a little low.    Vitamin D Deficiency:  Doing well on vitamin D     Arthralgia: only uses one form of the other  But not both at the same time.         Past History:    Medical History: has a past medical history of Aftercare following right knee joint replacement surgery (01/29/2018), Allergic conjunctivitis and rhinitis, Anemia, Arthritis, Hepatitis, History of total knee arthroplasty, right (05/16/2018), Limb swelling, Lipoma of arm (06/15/2020), Seasonal allergies, and Sinus trouble.     Surgical History: has a past surgical history that includes Other surgical history; Colonoscopy (06/2017); Breast mass excision (06/06/1997); Hysterectomy (2006); Ankle surgery; Anterior cruciate ligament repair (Bilateral, 1989/1991/2010); Tubal ligation (09/14/1993); and Rotator cuff repair (Left, 01/27/2021).     Family History: family history includes Arthritis in her brother and mother; Cancer in her mother; Diabetes in her brother, father, and sister; Heart disease in her father, maternal grandmother, and mother; Multiple myeloma (age of onset: 23) in her brother; Stroke in her father.     Social History: reports that she has never smoked. She has never used smokeless tobacco. She reports that she does not drink alcohol and does not use drugs.    Allergies: Sulfa antibiotics          Current Outpatient Medications:   •  acetaminophen (Tylenol 8 Hour) 650 MG 8 hr tablet, Tylenol Arthritis Pain 650 mg oral tablet extended release take 2 tablets by oral route every 8 hours   Active, Disp: , Rfl:   •  calcium carbonate-cholecalciferol 500-400 MG-UNIT tablet tablet, Take  by mouth Daily., Disp: , Rfl:   •  diclofenac (VOLTAREN) 50 MG EC tablet, Take 50 mg by mouth 3 (Three)  "Times a Day., Disp: , Rfl:   •  Diclofenac Sodium (VOLTAREN) 1 % gel gel, , Disp: , Rfl:   •  ferrous sulfate 324 (65 Fe) MG tablet delayed-release EC tablet, Take 324 mg by mouth Daily With Breakfast., Disp: , Rfl:   •  ibuprofen (ADVIL,MOTRIN) 200 MG tablet, Take 200 mg by mouth Every 6 (Six) Hours As Needed for Mild Pain ., Disp: , Rfl:   •  vitamin B-12 (CYANOCOBALAMIN) 500 MCG tablet, Take 500 mcg by mouth Daily., Disp: , Rfl:   •  vitamin D3 125 MCG (5000 UT) capsule capsule, Take 5,000 Units by mouth Daily., Disp: , Rfl:     Medications Discontinued During This Encounter   Medication Reason   • HYDROcodone-acetaminophen (NORCO) 5-325 MG per tablet *Therapy completed   • cefdinir (OMNICEF) 300 MG capsule *Therapy completed         Review of Systems   Constitutional: Negative for fever.   Respiratory: Negative for cough and shortness of breath.    Cardiovascular: Negative for chest pain, palpitations and leg swelling.   Neurological: Negative for dizziness, weakness, numbness and headache.        Objective         Vitals:    12/09/21 1426   BP: 130/70   BP Location: Right arm   Patient Position: Sitting   Cuff Size: Adult   Pulse: 97   Resp: 16   Temp: 98 °F (36.7 °C)   TempSrc: Infrared   SpO2: 97%   Weight: 90.9 kg (200 lb 4.8 oz)   Height: 180.3 cm (71\")     Body mass index is 27.94 kg/m².         Physical Exam  Vitals reviewed.   Constitutional:       Appearance: Normal appearance. She is well-developed.   HENT:      Head: Normocephalic and atraumatic.      Mouth/Throat:      Pharynx: No oropharyngeal exudate.   Eyes:      Conjunctiva/sclera: Conjunctivae normal.      Pupils: Pupils are equal, round, and reactive to light.   Cardiovascular:      Rate and Rhythm: Normal rate and regular rhythm.      Heart sounds: No murmur heard.  No friction rub. No gallop.    Pulmonary:      Effort: Pulmonary effort is normal.      Breath sounds: Normal breath sounds. No wheezing or rhonchi.   Skin:     General: Skin is " warm and dry.   Neurological:      Mental Status: She is alert and oriented to person, place, and time.   Psychiatric:         Mood and Affect: Mood and affect normal.         Behavior: Behavior normal.         Thought Content: Thought content normal.         Judgment: Judgment normal.             Result Review :               Assessment and Plan     Diagnoses and all orders for this visit:    1. Need for hepatitis C screening test (Primary)  -     Hepatitis C antibody; Future    2. Vitamin D deficiency  -     Vitamin D 25 hydroxy; Future    3. B12 deficiency  -     Vitamin B12; Future    4. Seasonal allergic rhinitis, unspecified trigger    5. Arthritis    6. Iron deficiency anemia, unspecified iron deficiency anemia type  -     Ferritin; Future  -     Iron and TIBC; Future  -     CBC No Differential; Future    7. Medication monitoring encounter  -     Comprehensive metabolic panel; Future              Follow Up     Return in about 6 months (around 6/9/2022).    Patient was given instructions and counseling regarding her condition or for health maintenance advice. Please see specific information pulled into the AVS if appropriate.

## 2022-01-10 ENCOUNTER — TELEPHONE (OUTPATIENT)
Dept: FAMILY MEDICINE CLINIC | Facility: CLINIC | Age: 67
End: 2022-01-10

## 2022-01-10 ENCOUNTER — HOSPITAL ENCOUNTER (OUTPATIENT)
Dept: MAMMOGRAPHY | Facility: HOSPITAL | Age: 67
End: 2022-01-10

## 2022-01-10 NOTE — TELEPHONE ENCOUNTER
Caller: Meliza Trivedi    Relationship: Self    Best call back number: [phone]    What form or medical record are you requesting: RELEASE TO GO BACK TO WORK DUE TO HAVING COVID. PT TESTED POSITIVE 05/01/22    Who is requesting this form or medical record from you: PATIENT AND HER WORK    How would you like to receive the form or medical records (pick-up, mail, fax): PLEASE CALL PATIENT WHEN ITS READY TO BE PICKED UP    Timeframe paperwork needed: ASAP

## 2022-01-10 NOTE — TELEPHONE ENCOUNTER
Informed patient to bring in documentation of where she was tested positive for covid and we will write her a work note. She voiced understanding and will be here in the morning.

## 2022-01-17 ENCOUNTER — APPOINTMENT (OUTPATIENT)
Dept: MAMMOGRAPHY | Facility: HOSPITAL | Age: 67
End: 2022-01-17

## 2022-02-22 ENCOUNTER — APPOINTMENT (OUTPATIENT)
Dept: MAMMOGRAPHY | Facility: HOSPITAL | Age: 67
End: 2022-02-22

## 2022-02-22 ENCOUNTER — HOSPITAL ENCOUNTER (OUTPATIENT)
Dept: MAMMOGRAPHY | Facility: HOSPITAL | Age: 67
Discharge: HOME | End: 2022-02-22
Admitting: NURSE PRACTITIONER

## 2022-02-22 DIAGNOSIS — Z12.31 ENCOUNTER FOR SCREENING MAMMOGRAM FOR MALIGNANT NEOPLASM OF BREAST: ICD-10-CM

## 2022-02-22 PROCEDURE — 77067 SCR MAMMO BI INCL CAD: CPT

## 2022-02-22 PROCEDURE — 77063 BREAST TOMOSYNTHESIS BI: CPT

## 2022-02-23 DIAGNOSIS — Z12.31 ENCOUNTER FOR SCREENING MAMMOGRAM FOR MALIGNANT NEOPLASM OF BREAST: Primary | ICD-10-CM

## 2022-03-13 DIAGNOSIS — M19.90 ARTHRITIS: ICD-10-CM

## 2022-03-23 ENCOUNTER — PATIENT MESSAGE (OUTPATIENT)
Dept: FAMILY MEDICINE CLINIC | Facility: CLINIC | Age: 67
End: 2022-03-23

## 2022-03-23 DIAGNOSIS — M19.90 ARTHRITIS: ICD-10-CM

## 2022-04-21 ENCOUNTER — TELEPHONE (OUTPATIENT)
Dept: FAMILY MEDICINE CLINIC | Facility: CLINIC | Age: 67
End: 2022-04-21

## 2022-04-21 NOTE — TELEPHONE ENCOUNTER
Caller: Adali Roberts Meliza    Relationship: Self    Best call back number: [phone]    What form or medical record are you requesting: LETTER FOR CHAIR SUPPORT    Who is requesting this form or medical record from you: PATIENTS JOB    How would you like to receive the form or medical records (pick-up, mail, fax): FAX  If fax, what is the fax number: WILL CALL BACK    Timeframe paperwork needed: WHEN POSSIBLE    Additional notes: PATIENT NEEDS A LETTER STATING THAT SHE NEEDS SUPPORT ADDED TO HER CHAIR FOR HER BACK. HER JOB CAN ORDER THE SUPPORT FOR HER AS LONG AS SHE HAS A WRITTEN STATEMENT FROM HER PROVIDER. SHE WILL CALL BACK WITH THE FAX NUMBER.

## 2022-04-21 NOTE — TELEPHONE ENCOUNTER
Caller: Meliza Trivedi    Relationship to patient: Self    Best call back number: [phone]    Patient is needing: PATIENT CALLED IN AND WANTED TO GIVE ADDITIONAL INFORMATION REGARDING PREVIOUS NOTE. SHE SAID THE FAX NUMBER [phone] ATTN: LALA BOUDREAUX.

## 2022-04-29 DIAGNOSIS — M19.90 ARTHRITIS: ICD-10-CM

## 2022-05-12 ENCOUNTER — CLINICAL SUPPORT (OUTPATIENT)
Dept: FAMILY MEDICINE CLINIC | Facility: CLINIC | Age: 67
End: 2022-05-12

## 2022-05-12 DIAGNOSIS — Z23 NEED FOR SECOND DOSE OF COVID-19 VACCINE: ICD-10-CM

## 2022-05-12 DIAGNOSIS — Z28.311 NEED FOR SECOND DOSE OF COVID-19 VACCINE: ICD-10-CM

## 2022-05-12 DIAGNOSIS — Z23 NEED FOR COVID-19 VACCINE: Primary | ICD-10-CM

## 2022-05-12 PROCEDURE — 91300 COVID-19 (PFIZER): CPT | Performed by: NURSE PRACTITIONER

## 2022-05-12 PROCEDURE — 0001A COVID-19 (PFIZER): CPT | Performed by: NURSE PRACTITIONER

## 2022-05-20 DIAGNOSIS — M19.90 ARTHRITIS: ICD-10-CM

## 2022-05-20 NOTE — TELEPHONE ENCOUNTER
Caller: Meliza Trivedi    Relationship: Self    Best call back number: [phone]    Requested Prescriptions:   Requested Prescriptions     Pending Prescriptions Disp Refills   • Diclofenac Sodium (VOLTAREN) 1 % gel gel 450 g 1     Sig: Apply  topically to the appropriate area as directed 2 (Two) Times a Day.        Pharmacy where request should be sent: [facility] - [address] - [phone]  - [phone] FX  FAX # [phone], PHONE NUMBER [phone]    Additional details provided by patient: PATIENT STATED THAT FAX NUMBER AND PHONE NUMBER IS DIFFERENT THAN WHAT WE HAVE     Does the patient have less than a 3 day supply:  [x] Yes  [] No    Angel MITCHELL Rep   05/20/22 08:01 EDT

## 2022-06-02 ENCOUNTER — OFFICE VISIT (OUTPATIENT)
Dept: FAMILY MEDICINE CLINIC | Facility: CLINIC | Age: 67
End: 2022-06-02

## 2022-06-02 VITALS
BODY MASS INDEX: 27.83 KG/M2 | HEART RATE: 87 BPM | DIASTOLIC BLOOD PRESSURE: 70 MMHG | RESPIRATION RATE: 16 BRPM | SYSTOLIC BLOOD PRESSURE: 110 MMHG | OXYGEN SATURATION: 98 % | TEMPERATURE: 97.7 F | HEIGHT: 71 IN | WEIGHT: 198.8 LBS

## 2022-06-02 DIAGNOSIS — Z51.81 MEDICATION MONITORING ENCOUNTER: ICD-10-CM

## 2022-06-02 DIAGNOSIS — M25.562 CHRONIC PAIN OF LEFT KNEE: Primary | ICD-10-CM

## 2022-06-02 DIAGNOSIS — M25.512 CHRONIC LEFT SHOULDER PAIN: ICD-10-CM

## 2022-06-02 DIAGNOSIS — E53.8 B12 DEFICIENCY: ICD-10-CM

## 2022-06-02 DIAGNOSIS — E55.9 VITAMIN D DEFICIENCY: ICD-10-CM

## 2022-06-02 DIAGNOSIS — D50.9 IRON DEFICIENCY ANEMIA, UNSPECIFIED IRON DEFICIENCY ANEMIA TYPE: ICD-10-CM

## 2022-06-02 DIAGNOSIS — G89.29 CHRONIC LEFT SHOULDER PAIN: ICD-10-CM

## 2022-06-02 DIAGNOSIS — Z13.6 ENCOUNTER FOR SCREENING FOR CARDIOVASCULAR DISORDERS: ICD-10-CM

## 2022-06-02 DIAGNOSIS — G89.29 CHRONIC PAIN OF LEFT KNEE: Primary | ICD-10-CM

## 2022-06-02 PROCEDURE — 99214 OFFICE O/P EST MOD 30 MIN: CPT | Performed by: NURSE PRACTITIONER

## 2022-06-02 NOTE — PROGRESS NOTES
Chief Complaint  Knee Pain (left) and Shoulder Pain (left)    Subjective        Meliza Roberts presents to [facility]  Knee pain left that is chronic as is left shoulder pain.  Gets a catch in left shoulder when she types.    Knee Pain   Pertinent negatives include no numbness.         Past History:    Medical History: has a past medical history of Aftercare following right knee joint replacement surgery (01/29/2018), Allergic conjunctivitis and rhinitis, Anemia, Arthritis, Hepatitis, History of total knee arthroplasty, right (05/16/2018), Limb swelling, Lipoma of arm (06/15/2020), Seasonal allergies, and Sinus trouble.     Surgical History: has a past surgical history that includes Other surgical history; Colonoscopy (06/2017); Breast mass excision (06/06/1997); Hysterectomy (2006); Ankle surgery; Anterior cruciate ligament repair (Bilateral, 1989/1991/2010); Tubal ligation (09/14/1993); and Rotator cuff repair (Left, 01/27/2021).     Family History: family history includes Arthritis in her brother and mother; Cancer in her mother; Diabetes in her brother, father, and sister; Heart disease in her father, maternal grandmother, and mother; Multiple myeloma (age of onset: 23) in her brother; Stroke in her father.     Social History: reports that she has never smoked. She has never used smokeless tobacco. She reports that she does not drink alcohol and does not use drugs.    Allergies: Sulfa antibiotics          Current Outpatient Medications:   •  acetaminophen (TYLENOL) 650 MG 8 hr tablet, Tylenol Arthritis Pain 650 mg oral tablet extended release take 2 tablets by oral route every 8 hours   Active, Disp: , Rfl:   •  calcium carbonate-cholecalciferol 500-400 MG-UNIT tablet tablet, Take  by mouth Daily., Disp: , Rfl:   •  diclofenac (VOLTAREN) 50 MG EC tablet, Take 1 tablet by mouth 2 (Two) Times a Day., Disp: 180 tablet, Rfl: 1  •  Diclofenac Sodium (VOLTAREN) 1 % gel gel,  "Apply  topically to the appropriate area as directed 2 (Two) Times a Day., Disp: 450 g, Rfl: 1  •  ferrous sulfate 324 (65 Fe) MG tablet delayed-release EC tablet, Take 324 mg by mouth Daily With Breakfast., Disp: , Rfl:   •  vitamin B-12 (CYANOCOBALAMIN) 500 MCG tablet, Take 500 mcg by mouth Daily., Disp: , Rfl:   •  vitamin D3 125 MCG (5000 UT) capsule capsule, Take 5,000 Units by mouth Daily., Disp: , Rfl:   •  ibuprofen (ADVIL,MOTRIN) 200 MG tablet, Take 200 mg by mouth Every 6 (Six) Hours As Needed for Mild Pain ., Disp: , Rfl:     There are no discontinued medications.      Review of Systems   Constitutional: Negative for fever.   Respiratory: Negative for cough and shortness of breath.    Cardiovascular: Negative for chest pain, palpitations and leg swelling.   Neurological: Negative for dizziness, weakness, numbness and headache.        Objective         Vitals:    06/02/22 0724   BP: 110/70   BP Location: Right arm   Patient Position: Sitting   Cuff Size: Adult   Pulse: 87   Resp: 16   Temp: 97.7 °F (36.5 °C)   TempSrc: Infrared   SpO2: 98%   Weight: 90.2 kg (198 lb 12.8 oz)   Height: 180.3 cm (70.98\")     Body mass index is 27.74 kg/m².         Physical Exam  Vitals reviewed.   Constitutional:       Appearance: Normal appearance. She is well-developed.   HENT:      Head: Normocephalic and atraumatic.      Mouth/Throat:      Pharynx: No oropharyngeal exudate.   Eyes:      Conjunctiva/sclera: Conjunctivae normal.      Pupils: Pupils are equal, round, and reactive to light.   Cardiovascular:      Rate and Rhythm: Normal rate and regular rhythm.      Heart sounds: Normal heart sounds. No murmur heard.    No friction rub. No gallop.   Pulmonary:      Effort: Pulmonary effort is normal.      Breath sounds: Normal breath sounds. No wheezing or rhonchi.   Musculoskeletal:         General: Tenderness present.        Arms:         Legs:       Comments: Mild tenderness to shoulder and some crepitus noted to knee and " pain to palpation.   Skin:     General: Skin is warm and dry.   Neurological:      Mental Status: She is alert and oriented to person, place, and time.   Psychiatric:         Mood and Affect: Mood and affect normal.         Behavior: Behavior normal.         Thought Content: Thought content normal.         Judgment: Judgment normal.             Result Review :               Assessment and Plan     Diagnoses and all orders for this visit:    1. Chronic pain of left knee (Primary)  -     MRI Knee Left Without Contrast; Future    2. Chronic left shoulder pain  -     MRI Shoulder Left Without Contrast; Future    3. Vitamin D deficiency  -     Vitamin D 25 hydroxy; Future    4. B12 deficiency  -     Vitamin B12; Future    5. Medication monitoring encounter    6. Iron deficiency anemia, unspecified iron deficiency anemia type  -     Iron and TIBC; Future  -     Ferritin; Future  -     Folate; Future    7. Encounter for screening for cardiovascular disorders  -     Comprehensive metabolic panel; Future  -     Lipid Panel w/ Chol/HDL Ratio; Future              Follow Up     Return in about 6 months (around 12/2/2022).    Patient was given instructions and counseling regarding her condition or for health maintenance advice. Please see specific information pulled into the AVS if appropriate.

## 2022-07-01 ENCOUNTER — HOSPITAL ENCOUNTER (OUTPATIENT)
Dept: MRI IMAGING | Facility: HOSPITAL | Age: 67
Discharge: HOME | End: 2022-07-01

## 2022-07-01 DIAGNOSIS — M25.562 CHRONIC PAIN OF LEFT KNEE: ICD-10-CM

## 2022-07-01 DIAGNOSIS — G89.29 CHRONIC LEFT SHOULDER PAIN: ICD-10-CM

## 2022-07-01 DIAGNOSIS — G89.29 CHRONIC PAIN OF LEFT KNEE: ICD-10-CM

## 2022-07-01 DIAGNOSIS — M25.512 CHRONIC LEFT SHOULDER PAIN: ICD-10-CM

## 2022-07-01 PROCEDURE — 73721 MRI JNT OF LWR EXTRE W/O DYE: CPT

## 2022-07-01 PROCEDURE — 73221 MRI JOINT UPR EXTREM W/O DYE: CPT

## 2022-07-06 DIAGNOSIS — M25.562 CHRONIC PAIN OF LEFT KNEE: Primary | ICD-10-CM

## 2022-07-06 DIAGNOSIS — G89.29 CHRONIC PAIN OF LEFT KNEE: Primary | ICD-10-CM

## 2022-07-20 ENCOUNTER — OFFICE VISIT (OUTPATIENT)
Dept: ORTHOPEDIC SURGERY | Facility: CLINIC | Age: 67
End: 2022-07-20

## 2022-07-20 VITALS — HEART RATE: 88 BPM | WEIGHT: 197.4 LBS | OXYGEN SATURATION: 97 % | HEIGHT: 71 IN | BODY MASS INDEX: 27.64 KG/M2

## 2022-07-20 DIAGNOSIS — M17.12 PRIMARY OSTEOARTHRITIS OF LEFT KNEE: Primary | ICD-10-CM

## 2022-07-20 DIAGNOSIS — S83.242D ACUTE MEDIAL MENISCUS TEAR OF LEFT KNEE, SUBSEQUENT ENCOUNTER: ICD-10-CM

## 2022-07-20 DIAGNOSIS — M25.562 LEFT KNEE PAIN, UNSPECIFIED CHRONICITY: ICD-10-CM

## 2022-07-20 PROCEDURE — 99203 OFFICE O/P NEW LOW 30 MIN: CPT | Performed by: ORTHOPAEDIC SURGERY

## 2022-07-20 PROCEDURE — 20610 DRAIN/INJ JOINT/BURSA W/O US: CPT | Performed by: ORTHOPAEDIC SURGERY

## 2022-07-20 RX ADMIN — TRIAMCINOLONE ACETONIDE 40 MG: 40 INJECTION, SUSPENSION INTRA-ARTICULAR; INTRAMUSCULAR at 07:56

## 2022-07-20 RX ADMIN — LIDOCAINE HYDROCHLORIDE 9 ML: 10 INJECTION, SOLUTION INFILTRATION; PERINEURAL at 07:56

## 2022-07-20 NOTE — PROGRESS NOTES
"Chief Complaint  Initial Evaluation of the Left Knee     Subjective      Meliza Roberts presents to [facility] for an evaluation of left knee. She has swelling and pain in the left knee. She was seen in the past, but had been doing well until a recent fall. She landed on the shoulder and injured the left knee. PCP was concerned for her knee and wanted her to have this further evaluated. She reports pain is tolerable today.     Allergies   Allergen Reactions   • Sulfa Antibiotics Urinary Retention        Social History     Socioeconomic History   • Marital status:    Tobacco Use   • Smoking status: Never Smoker   • Smokeless tobacco: Never Used   Vaping Use   • Vaping Use: Never used   Substance and Sexual Activity   • Alcohol use: Never     Comment: does not drink   • Drug use: Never   • Sexual activity: Not Currently        Review of Systems     Objective   Vital Signs:   Pulse 88   Ht 179.1 cm (70.5\")   Wt 89.5 kg (197 lb 6.4 oz)   SpO2 97%   BMI 27.92 kg/m²       Physical Exam  Constitutional:       Appearance: Normal appearance. Patient is well-developed and normal weight.   HENT:      Head: Normocephalic.      Right Ear: Hearing and external ear normal.      Left Ear: Hearing and external ear normal.      Nose: Nose normal.   Eyes:      Conjunctiva/sclera: Conjunctivae normal.   Cardiovascular:      Rate and Rhythm: Normal rate.   Pulmonary:      Effort: Pulmonary effort is normal.      Breath sounds: No wheezing or rales.   Abdominal:      Palpations: Abdomen is soft.      Tenderness: There is no abdominal tenderness.   Musculoskeletal:      Cervical back: Normal range of motion.   Skin:     Findings: No rash.   Neurological:      Mental Status: Patient  is alert and oriented to person, place, and time.   Psychiatric:         Mood and Affect: Mood and affect normal.         Judgment: Judgment normal.       Ortho Exam      LEFT KNEE: Mild swelling. Tender medial " joint line. Good strength to hamstrings, quadriceps, dorsiflexors and plantar flexors. Stable to varus/valgus stress. Stable anterior and posterior drawer. Negative lachman. Full extension. Flexion to 125 degrees. Calf soft.       Large Joint Arthrocentesis: L knee  Date/Time: 7/20/2022 7:56 AM  Consent given by: patient  Site marked: site marked  Timeout: Immediately prior to procedure a time out was called to verify the correct patient, procedure, equipment, support staff and site/side marked as required   Supporting Documentation  Indications: pain   Procedure Details  Location: knee - L knee  Preparation: Patient was prepped and draped in the usual sterile fashion  Needle size: 22 G  Medications administered: 40 mg triamcinolone acetonide 40 MG/ML; 9 mL lidocaine 1 %  Patient tolerance: patient tolerated the procedure well with no immediate complications              Imaging Results (Most Recent)     Procedure Component Value Units Date/Time    XR Knee 3 View Left [[id number]] Resulted: 07/20/22 0739     Updated: 07/20/22 0739           Result Review :     X-Ray Report:  Left knee(s) X-Ray  Indication: Evaluation of left knee pain   AP, Lateral and Standing view(s)  Findings: Advanced osteoarthritis of the left knee. No fractures or dislocation.   Prior studies available for comparison: no       MRI Knee Left Without Contrast    Result Date: 7/2/2022  Narrative: PROCEDURE: MRI KNEE LEFT WO CONTRAST  COMPARISON: None  INDICATIONS: Knee pain, chronic, degenerative disease on xray (Age >= 5y)      TECHNIQUE: A complete multi-planar MRI was performed.   FINDINGS:  An intact anterior cruciate ligament is not definitively identified.  The findings suggest likely prior remote ACL tear.  The posterior cruciate ligament is intact.  The medial collateral ligament appears intact.  There is a lobular fluid signal focus which underlies the distal pes anserine at the medial aspect of the knee.  These findings suggest changes  of pes anserine bursitis.  The components of the posterior lateral corner of the knee are intact.  The anterior extensor mechanism of the knee is intact.  There is evidence for a complex tear involving the posterior horn and body segment of the medial meniscus with a degenerative component.  There is peripheral subluxation of the body segment into the medial gutter.  There is marked abnormal signal associated with the expected position of the anterior horn and anterior body segment of the lateral meniscus.  The findings suggest changes of degenerative meniscal tear or possible partial meniscectomy.  There appears to be volume loss.  There is also evidence for a horizontal type meniscal tear involving the posterior horn of the lateral meniscus which extends through the inferior articular surface.  Advanced tricompartmental osteoarthritic degenerative changes are identified with evidence for articular cartilage loss, subchondral edema/cystic change, and osteophytosis. These findings are most pronounced within the medial compartment where near complete full-thickness articular cartilage denudation and more pronounced reactive marrow edema are seen.  No significant joint effusion is observed. No significant focal abnormal bone marrow signal is otherwise identified. The cortical margins are intact. No additional abnormal focal fluid collection is observed within the surrounding soft tissues.      Impression:   1. Evidence for prior likely remote disruption of the anterior cruciate ligament. 2. Findings suggesting changes of pes anserine bursitis. 3. Evidence for complex meniscal tear involving posterior horn and body segment of the medial meniscus with a degenerative component. 4. Marked abnormal signal and morphology associated with the expected position of the anterior horn and body segment of the lateral meniscus with evidence for volume loss.  The changes may relate to prior partial meniscectomy.  There is also  evidence for horizontal type meniscal tear involving the posterior horn of the lateral meniscus. 5. Advanced tricompartmental osteoarthritis.  There is near complete full-thickness articular cartilage denudation of the medial compartment.        SATISH MELVIN MD       Electronically Signed and Approved By: SATISH MELVIN MD on 7/02/2022 at 7:50                      Assessment and Plan     Diagnoses and all orders for this visit:    1. Primary osteoarthritis of left knee (Primary)    2. Left knee pain, unspecified chronicity  -     XR Knee 3 View Left    3. Acute medial meniscus tear of left knee, subsequent encounter        Left knee steroid injection given, she tolerated this well.     Call or return if worsening symptoms.    Follow Up     PRN.       Patient was given instructions and counseling regarding her condition or for health maintenance advice. Please see specific information pulled into the AVS if appropriate.     Scribed for Oc Wang MD by Brielle Martines.  07/20/22   07:47 EDT    I have personally performed the services described in this document as scribed by the above individual and it is both accurate and complete. Oc Wang MD 07/22/22

## 2022-07-22 RX ORDER — LIDOCAINE HYDROCHLORIDE 10 MG/ML
9 INJECTION, SOLUTION INFILTRATION; PERINEURAL
Status: COMPLETED | OUTPATIENT
Start: 2022-07-20 | End: 2022-07-20

## 2022-07-22 RX ORDER — TRIAMCINOLONE ACETONIDE 40 MG/ML
40 INJECTION, SUSPENSION INTRA-ARTICULAR; INTRAMUSCULAR
Status: COMPLETED | OUTPATIENT
Start: 2022-07-20 | End: 2022-07-20

## 2022-08-12 ENCOUNTER — TELEPHONE (OUTPATIENT)
Dept: FAMILY MEDICINE CLINIC | Facility: CLINIC | Age: 67
End: 2022-08-12

## 2022-08-12 NOTE — TELEPHONE ENCOUNTER
Caller: Meliza Trivedi    Relationship to patient: Self    Best call back number: [phone]    Chief complaint: RASH WITH SEVERE ITCHING ON THE UPPER PART OF HER TORSO    Patient directed to call 911 or go to their nearest emergency room.     Patient verbalized understanding: [x] Yes  [] No    Additional notes:  PATIENT CALLED TO ATTEMPT TO SCHEDULE APPOINTMENT TODAY WITH A PROVIDER FOR RASH WITH SEVERE ITCHING ALL OVER THE UPPER PART OF HER TORSO. SHE STATED IT WAS NOT HOT TO THE TOUCH.    HUB ADVISED PATIENT TO GO TO EMERGENCY ROOM FOR THIS ISSUE AND PATIENT AGREED AND STATED SHE WILL GO TO [facility] FOR THIS ISSUE.

## 2022-09-01 ENCOUNTER — OFFICE VISIT (OUTPATIENT)
Dept: FAMILY MEDICINE CLINIC | Facility: CLINIC | Age: 67
End: 2022-09-01

## 2022-09-01 VITALS
TEMPERATURE: 97.5 F | OXYGEN SATURATION: 98 % | HEIGHT: 71 IN | RESPIRATION RATE: 16 BRPM | BODY MASS INDEX: 26.85 KG/M2 | WEIGHT: 191.8 LBS | HEART RATE: 94 BPM | DIASTOLIC BLOOD PRESSURE: 64 MMHG | SYSTOLIC BLOOD PRESSURE: 130 MMHG

## 2022-09-01 DIAGNOSIS — D22.9 SKIN MOLE: ICD-10-CM

## 2022-09-01 DIAGNOSIS — Z78.0 POST-MENOPAUSE: Primary | ICD-10-CM

## 2022-09-01 DIAGNOSIS — T50.905A URTICARIA DUE TO DRUG ALLERGY: ICD-10-CM

## 2022-09-01 DIAGNOSIS — L50.0 URTICARIA DUE TO DRUG ALLERGY: ICD-10-CM

## 2022-09-01 PROCEDURE — 99213 OFFICE O/P EST LOW 20 MIN: CPT | Performed by: NURSE PRACTITIONER

## 2022-09-01 NOTE — PROGRESS NOTES
Chief Complaint  Urticaria    Subjective        Meliza Roberts presents to [facility]  Urticaria:  States had laser surgery in her mouth and was on antibiotic and then started back on diclofenac.  And started itching that night and was red all over.  Took benadryl and was red and itchy.  Clindamycin.  Was given steroid in the urgent care.    Spot   On left wrist and new area on right distal lower leg.        Past History:    Medical History: has a past medical history of Aftercare following right knee joint replacement surgery (01/29/2018), Allergic conjunctivitis and rhinitis, Anemia, Arthritis, Hepatitis, History of total knee arthroplasty, right (05/16/2018), Limb swelling, Lipoma of arm (06/15/2020), Seasonal allergies, and Sinus trouble.     Surgical History: has a past surgical history that includes Other surgical history; Colonoscopy (06/2017); Breast mass excision (06/06/1997); Hysterectomy (2006); Ankle surgery; Anterior cruciate ligament repair (Bilateral, 1989/1991/2010); Tubal ligation (09/14/1993); and Rotator cuff repair (Left, 01/27/2021).     Family History: family history includes Arthritis in her brother and mother; Cancer in her mother; Diabetes in her brother, father, and sister; Heart disease in her father, maternal grandmother, and mother; Multiple myeloma (age of onset: 23) in her brother; Stroke in her father.     Social History: reports that she has never smoked. She has never used smokeless tobacco. She reports that she does not drink alcohol and does not use drugs.    Allergies: Drug ingredient [cephalexin], Clindamycin/lincomycin, and Sulfa antibiotics          Current Outpatient Medications:   •  acetaminophen (TYLENOL) 650 MG 8 hr tablet, Tylenol Arthritis Pain 650 mg oral tablet extended release take 2 tablets by oral route every 8 hours   Active, Disp: , Rfl:   •  calcium carbonate-cholecalciferol 500-400 MG-UNIT tablet tablet, Take  by mouth  "Daily., Disp: , Rfl:   •  chlorhexidine (PERIDEX) 0.12 % solution, RINSE MOUTH WITH 15 ML (1 CAPFUL) FOR 30 SECONDS IN THE MORNING AND EVENING AFTER TOOTHBRUSHING. EXPECTORATE AFTER RINSING, DO NOT SWALLOW, Disp: , Rfl:   •  ferrous sulfate 324 (65 Fe) MG tablet delayed-release EC tablet, Take 324 mg by mouth Daily With Breakfast., Disp: , Rfl:   •  ibuprofen (ADVIL,MOTRIN) 200 MG tablet, Take 200 mg by mouth Every 6 (Six) Hours As Needed for Mild Pain ., Disp: , Rfl:   •  vitamin B-12 (CYANOCOBALAMIN) 500 MCG tablet, Take 500 mcg by mouth Daily., Disp: , Rfl:   •  vitamin D3 125 MCG (5000 UT) capsule capsule, Take 5,000 Units by mouth Daily., Disp: , Rfl:   •  clindamycin (CLEOCIN) 150 MG capsule, Take 1 capsule by mouth 4 (Four) Times a Day., Disp: 28 capsule, Rfl: 0  •  diclofenac (VOLTAREN) 50 MG EC tablet, Take 1 tablet by mouth 2 (Two) Times a Day., Disp: 180 tablet, Rfl: 1  •  Diclofenac Sodium (VOLTAREN) 1 % gel gel, Apply  topically to the appropriate area as directed 2 (Two) Times a Day., Disp: 450 g, Rfl: 1  •  methylPREDNISolone (MEDROL) 4 MG dose pack, Take as directed on package instructions., Disp: 21 each, Rfl: 0    There are no discontinued medications.      Review of Systems   Constitutional: Negative for fever.   Respiratory: Negative for cough and shortness of breath.    Cardiovascular: Negative for chest pain, palpitations and leg swelling.   Neurological: Negative for dizziness, weakness, numbness and headache.        Objective         Vitals:    09/01/22 0932   BP: 130/64   BP Location: Right arm   Patient Position: Sitting   Cuff Size: Adult   Pulse: 94   Resp: 16   Temp: 97.5 °F (36.4 °C)   TempSrc: Infrared   SpO2: 98%   Weight: 87 kg (191 lb 12.8 oz)   Height: 179.1 cm (70.51\")     Body mass index is 27.12 kg/m².         Physical Exam  Vitals reviewed.   Constitutional:       Appearance: Normal appearance. She is well-developed.   HENT:      Head: Normocephalic and atraumatic.      " Mouth/Throat:      Pharynx: No oropharyngeal exudate.   Eyes:      Conjunctiva/sclera: Conjunctivae normal.      Pupils: Pupils are equal, round, and reactive to light.   Cardiovascular:      Rate and Rhythm: Normal rate and regular rhythm.      Heart sounds: Normal heart sounds. No murmur heard.    No friction rub. No gallop.   Pulmonary:      Effort: Pulmonary effort is normal.      Breath sounds: Normal breath sounds. No wheezing or rhonchi.   Skin:     General: Skin is warm and dry.      Findings: Rash present. Rash is papular.      Comments: Flat dry rash left over arms and still discolored from the rash.   Neurological:      Mental Status: She is alert and oriented to person, place, and time.   Psychiatric:         Mood and Affect: Mood and affect normal.         Behavior: Behavior normal.         Thought Content: Thought content normal.         Judgment: Judgment normal.             Result Review :               Assessment and Plan     Diagnoses and all orders for this visit:    1. Post-menopause (Primary)  -     DEXA Bone Density Axial; Future    2. Skin mole  -     Ambulatory Referral to Dermatology    3. Urticaria due to drug allergy  Comments:  continue emollient cream and it should fade in the next few weeks.              Follow Up     Return for Next scheduled follow up.    Patient was given instructions and counseling regarding her condition or for health maintenance advice. Please see specific information pulled into the AVS if appropriate.

## 2022-09-12 ENCOUNTER — HOSPITAL ENCOUNTER (OUTPATIENT)
Dept: BONE DENSITY | Facility: HOSPITAL | Age: 67
Discharge: HOME | End: 2022-09-12
Admitting: NURSE PRACTITIONER

## 2022-09-12 DIAGNOSIS — Z78.0 POST-MENOPAUSE: ICD-10-CM

## 2022-09-12 PROCEDURE — 77080 DXA BONE DENSITY AXIAL: CPT

## 2022-10-03 ENCOUNTER — TELEPHONE (OUTPATIENT)
Dept: FAMILY MEDICINE CLINIC | Facility: CLINIC | Age: 67
End: 2022-10-03

## 2022-10-03 NOTE — TELEPHONE ENCOUNTER
Caller: Adali RobertsMeliza    Relationship: Self    Best call back number: [phone]    What form or medical record are you requesting: DISABILITY PAPER WORK FOR EMPLOYER     Who is requesting this form or medical record from you: [employer]     How would you like to receive the form or medical records (pick-up, mail, fax): PATIENT IS REQUESTING FOR , PLEASE CONTACT ONCE AVAILABLE.     Timeframe paperwork needed: ASAP       Additional notes: PATIENT CALLING REQUESTING DISABILITY  PAPERWORK.

## 2022-10-04 NOTE — TELEPHONE ENCOUNTER
Sent Yornt message stating we have not received any paperwork to fill out and that the PCP is not in office this week.     Patient has appt on 10/17/2022

## 2022-10-12 NOTE — TELEPHONE ENCOUNTER
Patient states that Bishnu wrote a letter for her back in August of 2021. She is needing that letter renewed.

## 2022-10-17 ENCOUNTER — OFFICE VISIT (OUTPATIENT)
Dept: FAMILY MEDICINE CLINIC | Facility: CLINIC | Age: 67
End: 2022-10-17

## 2022-10-17 VITALS
SYSTOLIC BLOOD PRESSURE: 118 MMHG | HEIGHT: 71 IN | RESPIRATION RATE: 16 BRPM | BODY MASS INDEX: 28.14 KG/M2 | OXYGEN SATURATION: 98 % | DIASTOLIC BLOOD PRESSURE: 74 MMHG | TEMPERATURE: 96.1 F | HEART RATE: 96 BPM | WEIGHT: 201 LBS

## 2022-10-17 DIAGNOSIS — Z00.00 MEDICARE ANNUAL WELLNESS VISIT, SUBSEQUENT: Primary | ICD-10-CM

## 2022-10-17 PROCEDURE — G0439 PPPS, SUBSEQ VISIT: HCPCS | Performed by: NURSE PRACTITIONER

## 2022-10-17 PROCEDURE — 1170F FXNL STATUS ASSESSED: CPT | Performed by: NURSE PRACTITIONER

## 2022-10-17 PROCEDURE — 96160 PT-FOCUSED HLTH RISK ASSMT: CPT | Performed by: NURSE PRACTITIONER

## 2022-10-17 PROCEDURE — 1160F RVW MEDS BY RX/DR IN RCRD: CPT | Performed by: NURSE PRACTITIONER

## 2022-10-17 NOTE — PROGRESS NOTES
The ABCs of the Annual Wellness Visit  Subsequent Medicare Wellness Visit    Chief Complaint   Patient presents with   • Medicare Wellness-subsequent      Subjective    History of Present Illness:  Meliza Roberts is a 67 y.o. female who presents for a Subsequent Medicare Wellness Visit.      The following portions of the patient's history were reviewed and   updated as appropriate: allergies, current medications, past family history, past medical history, past social history, past surgical history and problem list.    Compared to one year ago, the patient feels her physical   health is worse.    Compared to one year ago, the patient feels her mental   health is the same.    Recent Hospitalizations:  She was not admitted to the hospital during the last year.       Current Medical Providers:  Patient Care Team:  Bishnu Figueroa APRN as PCP - General (Nurse Practitioner)    Outpatient Medications Prior to Visit   Medication Sig Dispense Refill   • acetaminophen (TYLENOL) 650 MG 8 hr tablet Tylenol Arthritis Pain 650 mg oral tablet extended release take 2 tablets by oral route every 8 hours   Active     • calcium carbonate-cholecalciferol 500-400 MG-UNIT tablet tablet Take  by mouth Daily.     • diclofenac (VOLTAREN) 50 MG EC tablet Take 1 tablet by mouth 2 (Two) Times a Day. 180 tablet 1   • Diclofenac Sodium (VOLTAREN) 1 % gel gel Apply  topically to the appropriate area as directed 2 (Two) Times a Day. 450 g 1   • ferrous sulfate 324 (65 Fe) MG tablet delayed-release EC tablet Take 324 mg by mouth Daily With Breakfast.     • vitamin B-12 (CYANOCOBALAMIN) 500 MCG tablet Take 500 mcg by mouth Daily.     • vitamin D3 125 MCG (5000 UT) capsule capsule Take 5,000 Units by mouth Daily.     • chlorhexidine (PERIDEX) 0.12 % solution RINSE MOUTH WITH 15 ML (1 CAPFUL) FOR 30 SECONDS IN THE MORNING AND EVENING AFTER TOOTHBRUSHING. EXPECTORATE AFTER RINSING, DO NOT SWALLOW     • clindamycin (CLEOCIN) 150 MG capsule Take  "1 capsule by mouth 4 (Four) Times a Day. 28 capsule 0   • ibuprofen (ADVIL,MOTRIN) 200 MG tablet Take 200 mg by mouth Every 6 (Six) Hours As Needed for Mild Pain .     • methylPREDNISolone (MEDROL) 4 MG dose pack Take as directed on package instructions. 21 each 0     No facility-administered medications prior to visit.       No opioid medication identified on active medication list. I have reviewed chart for other potential  high risk medication/s and harmful drug interactions in the elderly.          Aspirin is not on active medication list.  Aspirin use is not indicated based on review of current medical condition/s. Risk of harm outweighs potential benefits.  .    Patient Active Problem List   Diagnosis   • Aftercare following surgery of the musculoskeletal system   • Chronic left shoulder pain   • Allergic condition   • Anemia   • Arthritis   • History of total knee arthroplasty, right   • Seasonal allergic rhinitis   • Sinus trouble   • Lipoma of arm   • Vitamin D deficiency   • B12 deficiency     Advance Care Planning  Advance Directive is on file.  ACP discussion was held with the patient during this visit. Patient has an advance directive in EMR which is still valid.           Objective    Vitals:    10/17/22 0657   BP: 118/74   BP Location: Right arm   Patient Position: Sitting   Cuff Size: Adult   Pulse: 96   Resp: 16   Temp: 96.1 °F (35.6 °C)   TempSrc: Temporal   SpO2: 98%   Weight: 91.2 kg (201 lb)   Height: 179.1 cm (70.51\")     Estimated body mass index is 28.42 kg/m² as calculated from the following:    Height as of this encounter: 179.1 cm (70.51\").    Weight as of this encounter: 91.2 kg (201 lb).    BMI is >= 25 and <30. (Overweight) The following options were offered after discussion;: weight loss educational material (shared in after visit summary)      Does the patient have evidence of cognitive impairment? No    Physical Exam            HEALTH RISK ASSESSMENT    Smoking Status:  Social " History     Tobacco Use   Smoking Status Never   Smokeless Tobacco Never     Alcohol Consumption:  Social History     Substance and Sexual Activity   Alcohol Use Never    Comment: does not drink     Fall Risk Screen:    MISADI Fall Risk Assessment was completed, and patient is at LOW risk for falls.Assessment completed on:10/17/2022    Depression Screening:  PHQ-2/PHQ-9 Depression Screening 10/17/2022   Retired PHQ-9 Total Score -   Retired Total Score -   Little Interest or Pleasure in Doing Things 0-->not at all   Feeling Down, Depressed or Hopeless 0-->not at all   PHQ-9: Brief Depression Severity Measure Score 0       Health Habits and Functional and Cognitive Screening:  Functional & Cognitive Status 10/17/2022   Do you have difficulty preparing food and eating? No   Do you have difficulty bathing yourself, getting dressed or grooming yourself? No   Do you have difficulty using the toilet? Yes   Do you have difficulty moving around from place to place? Yes   Do you have trouble with steps or getting out of a bed or a chair? Yes   Current Diet Well Balanced Diet   Dental Exam Up to date   Eye Exam Not up to date   Exercise (times per week) 0 times per week   Current Exercises Include No Regular Exercise   Do you need help using the phone?  No   Are you deaf or do you have serious difficulty hearing?  No   Do you need help with transportation? No   Do you need help shopping? No   Do you need help preparing meals?  No   Do you need help with housework?  Yes   Do you need help with laundry? No   Do you need help taking your medications? No   Do you need help managing money? No   Do you ever drive or ride in a car without wearing a seat belt? No   Have you felt unusual stress, anger or loneliness in the last month? Yes   Who do you live with? Sibling   If you need help, do you have trouble finding someone available to you? No   Have you been bothered in the last four weeks by sexual problems? No   Do you have  difficulty concentrating, remembering or making decisions? No       Age-appropriate Screening Schedule:  Refer to the list below for future screening recommendations based on patient's age, sex and/or medical conditions. Orders for these recommended tests are listed in the plan section. The patient has been provided with a written plan.    Health Maintenance   Topic Date Due   • INFLUENZA VACCINE  03/31/2023 (Originally 8/1/2022)   • MAMMOGRAM  02/22/2024   • DXA SCAN  09/12/2024   • TDAP/TD VACCINES (2 - Td or Tdap) 10/09/2029   • ZOSTER VACCINE  Completed              Assessment & Plan   CMS Preventative Services Quick Reference  Risk Factors Identified During Encounter  Obesity/Overweight   The above risks/problems have been discussed with the patient.  Follow up actions/plans if indicated are seen below in the Assessment/Plan Section.  Pertinent information has been shared with the patient in the After Visit Summary.    Diagnoses and all orders for this visit:    1. Medicare annual wellness visit, subsequent (Primary)        Follow Up:   Return for Next scheduled follow up.     An After Visit Summary and PPPS were made available to the patient.

## 2022-10-25 ENCOUNTER — TELEPHONE (OUTPATIENT)
Dept: FAMILY MEDICINE CLINIC | Facility: CLINIC | Age: 67
End: 2022-10-25

## 2022-10-25 ENCOUNTER — CLINICAL SUPPORT (OUTPATIENT)
Dept: FAMILY MEDICINE CLINIC | Facility: CLINIC | Age: 67
End: 2022-10-25

## 2022-10-25 DIAGNOSIS — Z23 NEED FOR INFLUENZA VACCINATION: Primary | ICD-10-CM

## 2022-10-25 PROCEDURE — G0008 ADMIN INFLUENZA VIRUS VAC: HCPCS | Performed by: NURSE PRACTITIONER

## 2022-10-25 PROCEDURE — 90686 IIV4 VACC NO PRSV 0.5 ML IM: CPT | Performed by: NURSE PRACTITIONER

## 2022-10-25 NOTE — TELEPHONE ENCOUNTER
[facility]   Appointment was scheduled for 10/26 with Freda for a flu shot. Patient does not need an appointment for a flu shot. Patient can walk in Monday-Friday [phone].

## 2022-11-10 DIAGNOSIS — M19.90 ARTHRITIS: ICD-10-CM

## 2022-11-10 NOTE — TELEPHONE ENCOUNTER
Caller: Meliza Trivedi    Relationship: Self    Best call back number: [phone]     Requested Prescriptions:   Requested Prescriptions     Pending Prescriptions Disp Refills   • diclofenac (VOLTAREN) 50 MG EC tablet 180 tablet 1     Sig: Take 1 tablet by mouth 2 (Two) Times a Day.   • Diclofenac Sodium (VOLTAREN) 1 % gel gel 450 g 1     Sig: Apply  topically to the appropriate area as directed 2 (Two) Times a Day.        Pharmacy where request should be sent: [address] [phone] [facility] [phone]      Additional details provided by patient: PATIENT WOULD LIKE A WRITTEN PRESCRIPTION.  PLEASE CALL WHEN READY FOR     Does the patient have less than a 3 day supply:  [] Yes  [x] No    Angel Posadas Rep   11/10/22 07:54 EST

## 2022-11-20 ENCOUNTER — HOSPITAL ENCOUNTER (EMERGENCY)
Facility: HOSPITAL | Age: 67
Discharge: HOME | End: 2022-11-20
Attending: EMERGENCY MEDICINE | Admitting: EMERGENCY MEDICINE

## 2022-11-20 ENCOUNTER — APPOINTMENT (OUTPATIENT)
Dept: GENERAL RADIOLOGY | Facility: HOSPITAL | Age: 67
End: 2022-11-20

## 2022-11-20 VITALS
SYSTOLIC BLOOD PRESSURE: 108 MMHG | OXYGEN SATURATION: 98 % | BODY MASS INDEX: 28.47 KG/M2 | TEMPERATURE: 97.9 F | HEART RATE: 79 BPM | RESPIRATION RATE: 20 BRPM | HEIGHT: 70 IN | DIASTOLIC BLOOD PRESSURE: 68 MMHG | WEIGHT: 198.85 LBS

## 2022-11-20 DIAGNOSIS — R79.89 ELEVATED LFTS: ICD-10-CM

## 2022-11-20 DIAGNOSIS — R11.2 NAUSEA AND VOMITING, UNSPECIFIED VOMITING TYPE: ICD-10-CM

## 2022-11-20 DIAGNOSIS — K20.90 ESOPHAGITIS, ACUTE: ICD-10-CM

## 2022-11-20 DIAGNOSIS — R07.89 ATYPICAL CHEST PAIN: Primary | ICD-10-CM

## 2022-11-20 LAB
ALBUMIN SERPL-MCNC: 4.1 G/DL (ref 3.5–5.2)
ALBUMIN/GLOB SERPL: 1.2 G/DL
ALP SERPL-CCNC: 118 U/L (ref 39–117)
ALT SERPL W P-5'-P-CCNC: 170 U/L (ref 1–33)
ANION GAP SERPL CALCULATED.3IONS-SCNC: 10.2 MMOL/L (ref 5–15)
AST SERPL-CCNC: 99 U/L (ref 1–32)
BASOPHILS # BLD AUTO: 0.01 10*3/MM3 (ref 0–0.2)
BASOPHILS NFR BLD AUTO: 0.1 % (ref 0–1.5)
BILIRUB SERPL-MCNC: 0.3 MG/DL (ref 0–1.2)
BUN SERPL-MCNC: 20 MG/DL (ref 8–23)
BUN/CREAT SERPL: 24.4 (ref 7–25)
CALCIUM SPEC-SCNC: 9.3 MG/DL (ref 8.6–10.5)
CHLORIDE SERPL-SCNC: 103 MMOL/L (ref 98–107)
CO2 SERPL-SCNC: 26.8 MMOL/L (ref 22–29)
CREAT SERPL-MCNC: 0.82 MG/DL (ref 0.57–1)
DEPRECATED RDW RBC AUTO: 36.6 FL (ref 37–54)
EGFRCR SERPLBLD CKD-EPI 2021: 79.5 ML/MIN/1.73
EOSINOPHIL # BLD AUTO: 0.25 10*3/MM3 (ref 0–0.4)
EOSINOPHIL NFR BLD AUTO: 3 % (ref 0.3–6.2)
ERYTHROCYTE [DISTWIDTH] IN BLOOD BY AUTOMATED COUNT: 14 % (ref 12.3–15.4)
GLOBULIN UR ELPH-MCNC: 3.5 GM/DL
GLUCOSE SERPL-MCNC: 101 MG/DL (ref 65–99)
HCT VFR BLD AUTO: 36 % (ref 34–46.6)
HGB BLD-MCNC: 11 G/DL (ref 12–15.9)
HOLD SPECIMEN: NORMAL
HOLD SPECIMEN: NORMAL
IMM GRANULOCYTES # BLD AUTO: 0.02 10*3/MM3 (ref 0–0.05)
IMM GRANULOCYTES NFR BLD AUTO: 0.2 % (ref 0–0.5)
LIPASE SERPL-CCNC: 34 U/L (ref 13–60)
LYMPHOCYTES # BLD AUTO: 2.63 10*3/MM3 (ref 0.7–3.1)
LYMPHOCYTES NFR BLD AUTO: 31.8 % (ref 19.6–45.3)
MAGNESIUM SERPL-MCNC: 2.1 MG/DL (ref 1.6–2.4)
MCH RBC QN AUTO: 22.2 PG (ref 26.6–33)
MCHC RBC AUTO-ENTMCNC: 30.6 G/DL (ref 31.5–35.7)
MCV RBC AUTO: 72.6 FL (ref 79–97)
MICROCYTES BLD QL: NORMAL
MONOCYTES # BLD AUTO: 0.69 10*3/MM3 (ref 0.1–0.9)
MONOCYTES NFR BLD AUTO: 8.3 % (ref 5–12)
NEUTROPHILS NFR BLD AUTO: 4.67 10*3/MM3 (ref 1.7–7)
NEUTROPHILS NFR BLD AUTO: 56.6 % (ref 42.7–76)
NRBC BLD AUTO-RTO: 0 /100 WBC (ref 0–0.2)
NT-PROBNP SERPL-MCNC: 52.3 PG/ML (ref 0–900)
PLATELET # BLD AUTO: 297 10*3/MM3 (ref 140–450)
PMV BLD AUTO: 9.7 FL (ref 6–12)
POTASSIUM SERPL-SCNC: 3.7 MMOL/L (ref 3.5–5.2)
PROT SERPL-MCNC: 7.6 G/DL (ref 6–8.5)
RBC # BLD AUTO: 4.96 10*6/MM3 (ref 3.77–5.28)
SMALL PLATELETS BLD QL SMEAR: ADEQUATE
SODIUM SERPL-SCNC: 140 MMOL/L (ref 136–145)
TROPONIN I SERPL-MCNC: 0 NG/ML (ref 0–0.08)
WBC MORPH BLD: NORMAL
WBC NRBC COR # BLD: 8.27 10*3/MM3 (ref 3.4–10.8)
WHOLE BLOOD HOLD COAG: NORMAL
WHOLE BLOOD HOLD SPECIMEN: NORMAL

## 2022-11-20 PROCEDURE — 99283 EMERGENCY DEPT VISIT LOW MDM: CPT

## 2022-11-20 PROCEDURE — 83735 ASSAY OF MAGNESIUM: CPT | Performed by: EMERGENCY MEDICINE

## 2022-11-20 PROCEDURE — 83690 ASSAY OF LIPASE: CPT | Performed by: EMERGENCY MEDICINE

## 2022-11-20 PROCEDURE — 71045 X-RAY EXAM CHEST 1 VIEW: CPT

## 2022-11-20 PROCEDURE — 85025 COMPLETE CBC W/AUTO DIFF WBC: CPT | Performed by: EMERGENCY MEDICINE

## 2022-11-20 PROCEDURE — 36415 COLL VENOUS BLD VENIPUNCTURE: CPT

## 2022-11-20 PROCEDURE — 85007 BL SMEAR W/DIFF WBC COUNT: CPT | Performed by: EMERGENCY MEDICINE

## 2022-11-20 PROCEDURE — 80053 COMPREHEN METABOLIC PANEL: CPT | Performed by: EMERGENCY MEDICINE

## 2022-11-20 PROCEDURE — 84484 ASSAY OF TROPONIN QUANT: CPT

## 2022-11-20 PROCEDURE — 93005 ELECTROCARDIOGRAM TRACING: CPT | Performed by: EMERGENCY MEDICINE

## 2022-11-20 PROCEDURE — 83880 ASSAY OF NATRIURETIC PEPTIDE: CPT | Performed by: EMERGENCY MEDICINE

## 2022-11-20 PROCEDURE — 93005 ELECTROCARDIOGRAM TRACING: CPT

## 2022-11-20 RX ORDER — SODIUM CHLORIDE 0.9 % (FLUSH) 0.9 %
10 SYRINGE (ML) INJECTION AS NEEDED
Status: DISCONTINUED | OUTPATIENT
Start: 2022-11-20 | End: 2022-11-21 | Stop reason: HOSPADM

## 2022-11-20 RX ORDER — ASPIRIN 81 MG/1
324 TABLET, CHEWABLE ORAL ONCE
Status: DISCONTINUED | OUTPATIENT
Start: 2022-11-20 | End: 2022-11-21 | Stop reason: HOSPADM

## 2022-11-20 RX ORDER — ONDANSETRON 4 MG/1
4 TABLET, ORALLY DISINTEGRATING ORAL EVERY 6 HOURS PRN
Qty: 12 TABLET | Refills: 0 | Status: SHIPPED | OUTPATIENT
Start: 2022-11-20 | End: 2023-01-03

## 2022-11-20 RX ORDER — LIDOCAINE HYDROCHLORIDE 20 MG/ML
15 SOLUTION OROPHARYNGEAL ONCE
Status: DISCONTINUED | OUTPATIENT
Start: 2022-11-20 | End: 2022-11-21 | Stop reason: HOSPADM

## 2022-11-20 RX ORDER — ALUMINA, MAGNESIA, AND SIMETHICONE 2400; 2400; 240 MG/30ML; MG/30ML; MG/30ML
15 SUSPENSION ORAL ONCE
Status: DISCONTINUED | OUTPATIENT
Start: 2022-11-20 | End: 2022-11-21 | Stop reason: HOSPADM

## 2022-11-21 LAB — QT INTERVAL: 384 MS

## 2022-11-21 NOTE — DISCHARGE INSTRUCTIONS
It sounds like you either had a stomach virus or some food poisoning causing your belly pain and vomiting.    This vomiting most likely irritated the inside lining of the esophagus causing your chest pain (esophagitis), and should get better with time and rest and a bland diet.    You may need to use some Tums or some over-the-counter Maalox or Mylanta for symptom relief.    Your chest x-ray and EKG looked okay and your troponin was 0.00, consistent with no signs of heart attack.    Return to the ER if anything worsens.

## 2022-11-21 NOTE — ED PROVIDER NOTES
Time: 8:38 PM EST  Chief Complaint:   Chief Complaint   Patient presents with   • Chest Pain           History of Present Illness:  Patient is a 67 y.o. year old female who presents to the emergency department with complaints of sternal chest pain that started yesterday afternoon whenever she ate 2 boiled eggs.  She reports shortly thereafter she developed some indigestion and  chest pain that she rated 4 out of 10. Pt had some nausea and multiple episodes of emesis. Pt additionally describes pain as soreness and she has had similar sx previously.  She denies ever having a heart attack before and no family hx of heart attacks prior to 50 years old. Pt denies any hx of DM, HLD, or HTN.           HPI        Patient Care Team  Primary Care Provider: Bishnu Figueroa APRN    Past Medical History:     Allergies   Allergen Reactions   • Drug Ingredient [Cephalexin] Rash     Itching   • Clindamycin/Lincomycin Hives   • Sulfa Antibiotics Urinary Retention     Past Medical History:   Diagnosis Date   • Aftercare following right knee joint replacement surgery 01/29/2018   • Allergic conjunctivitis and rhinitis    • Anemia    • Arthritis    • Hepatitis    • History of total knee arthroplasty, right 05/16/2018   • Limb swelling    • Lipoma of arm 06/15/2020   • Seasonal allergies    • Sinus trouble      Past Surgical History:   Procedure Laterality Date   • ANKLE SURGERY     • BREAST MASS EXCISION  06/06/1997    w needle localization   • COLONOSCOPY  06/2017   • HYSTERECTOMY  2006   • KNEE ACL RECONSTRUCTION Bilateral 1989/1991/2010    rt knee- 89'/10' left knee-91'   • OTHER SURGICAL HISTORY      artificial joints/limbs   • ROTATOR CUFF REPAIR Left 01/27/2021   • TUBAL ABDOMINAL LIGATION  09/14/1993     Family History   Problem Relation Age of Onset   • Heart disease Mother    • Cancer Mother    • Arthritis Mother    • Stroke Father    • Heart disease Father    • Diabetes Father    • Diabetes Sister    • Diabetes Brother    •  Multiple myeloma Brother 23   • Arthritis Brother    • Heart disease Maternal Grandmother        Home Medications:  Prior to Admission medications    Medication Sig Start Date End Date Taking? Authorizing Provider   acetaminophen (TYLENOL) 650 MG 8 hr tablet Tylenol Arthritis Pain 650 mg oral tablet extended release take 2 tablets by oral route every 8 hours   Active    Sujit Mtz MD   calcium carbonate-cholecalciferol 500-400 MG-UNIT tablet tablet Take  by mouth Daily.    Sujit Mtz MD   chlorhexidine (PERIDEX) 0.12 % solution RINSE MOUTH WITH 15 ML (1 CAPFUL) FOR 30 SECONDS IN THE MORNING AND EVENING AFTER TOOTHBRUSHING. EXPECTORATE AFTER RINSING, DO NOT SWALLOW 7/28/22   Emergency, Nurse Epic, RN   clindamycin (CLEOCIN) 150 MG capsule Take 1 capsule by mouth 4 (Four) Times a Day. 8/5/22   Narendra Puckett DO   diclofenac (VOLTAREN) 50 MG EC tablet Take 1 tablet by mouth 2 (Two) Times a Day. 11/10/22   Bishnu Figueroa APRN   Diclofenac Sodium (VOLTAREN) 1 % gel gel Apply  topically to the appropriate area as directed 2 (Two) Times a Day. 11/10/22   Bishnu Figueora APRN   ferrous sulfate 324 (65 Fe) MG tablet delayed-release EC tablet Take 324 mg by mouth Daily With Breakfast.    Sujit Mtz MD   ibuprofen (ADVIL,MOTRIN) 200 MG tablet Take 200 mg by mouth Every 6 (Six) Hours As Needed for Mild Pain .    Sujit Mtz MD   methylPREDNISolone (MEDROL) 4 MG dose pack Take as directed on package instructions. 8/12/22   Christopher Damon MD   vitamin B-12 (CYANOCOBALAMIN) 500 MCG tablet Take 500 mcg by mouth Daily.    Sujit Mtz MD   vitamin D3 125 MCG (5000 UT) capsule capsule Take 5,000 Units by mouth Daily.    Sujit Mtz MD        Social History:   Social History     Tobacco Use   • Smoking status: Never   • Smokeless tobacco: Never   Vaping Use   • Vaping Use: Never used   Substance Use Topics   • Alcohol use: Never     Comment: does not drink   •  "Drug use: Never         Review of Systems:  Review of Systems   Constitutional: Negative for chills and fever.   HENT: Negative for congestion, ear pain and sore throat.    Eyes: Negative for pain.   Respiratory: Negative for cough, chest tightness and shortness of breath.    Cardiovascular: Positive for chest pain.   Gastrointestinal: Positive for nausea and vomiting. Negative for abdominal pain and diarrhea.   Genitourinary: Negative for flank pain and hematuria.   Musculoskeletal: Negative for joint swelling.   Skin: Negative for pallor.   Neurological: Negative for seizures and headaches.   All other systems reviewed and are negative.       Physical Exam:  /82   Pulse 72   Temp 97.9 °F (36.6 °C) (Oral)   Resp 18   Ht 177.8 cm (70\")   Wt 90.2 kg (198 lb 13.7 oz)   LMP  (LMP Unknown)   SpO2 100%   BMI 28.53 kg/m²     Physical Exam  Vitals and nursing note reviewed.   Constitutional:       General: She is not in acute distress.     Appearance: Normal appearance. She is not toxic-appearing.   HENT:      Head: Normocephalic and atraumatic.      Mouth/Throat:      Mouth: Mucous membranes are moist.   Eyes:      General: No scleral icterus.  Cardiovascular:      Rate and Rhythm: Normal rate and regular rhythm.      Pulses: Normal pulses.           Carotid pulses are 2+ on the right side and 2+ on the left side.       Radial pulses are 2+ on the right side and 2+ on the left side.        Femoral pulses are 2+ on the right side and 2+ on the left side.       Popliteal pulses are 2+ on the right side and 2+ on the left side.        Dorsalis pedis pulses are 2+ on the right side and 2+ on the left side.        Posterior tibial pulses are 2+ on the right side and 2+ on the left side.      Heart sounds: Normal heart sounds.   Pulmonary:      Effort: Pulmonary effort is normal. No respiratory distress.      Breath sounds: Normal breath sounds.   Abdominal:      General: Abdomen is flat.      Palpations: Abdomen " is soft.      Tenderness: There is no abdominal tenderness.   Musculoskeletal:         General: Normal range of motion.      Cervical back: Normal range of motion and neck supple.   Skin:     General: Skin is warm and dry.   Neurological:      Mental Status: She is alert and oriented to person, place, and time. Mental status is at baseline.                Medications in the Emergency Department:  Medications   sodium chloride 0.9 % flush 10 mL (has no administration in time range)   aspirin chewable tablet 324 mg (0 mg Oral Hold 11/20/22 2046)   aluminum-magnesium hydroxide-simethicone (MAALOX MAX) 400-400-40 MG/5ML suspension 15 mL (has no administration in time range)   Lidocaine Viscous HCl (XYLOCAINE) 2 % solution 15 mL (has no administration in time range)        Labs  Lab Results (last 24 hours)     Procedure Component Value Units Date/Time    CBC & Differential [[id number]]  (Abnormal) Collected: 11/20/22 2031    Specimen: Blood Updated: 11/20/22 2121    Narrative:      The following orders were created for panel order CBC & Differential.  Procedure                               Abnormality         Status                     ---------                               -----------         ------                     CBC Auto Differential[[id number]]        Abnormal            Final result               Scan Slide[[id number]]                                       Final result                 Please view results for these tests on the individual orders.    Comprehensive Metabolic Panel [[id number]]  (Abnormal) Collected: 11/20/22 2031    Specimen: Blood Updated: 11/20/22 2109     Glucose 101 mg/dL      BUN 20 mg/dL      Creatinine 0.82 mg/dL      Sodium 140 mmol/L      Potassium 3.7 mmol/L      Chloride 103 mmol/L      CO2 26.8 mmol/L      Calcium 9.3 mg/dL      Total Protein 7.6 g/dL      Albumin 4.10 g/dL      ALT (SGPT) 170 U/L      AST (SGOT) 99 U/L      Alkaline Phosphatase 118 U/L      Total Bilirubin 0.3 mg/dL       Globulin 3.5 gm/dL      A/G Ratio 1.2 g/dL      BUN/Creatinine Ratio 24.4     Anion Gap 10.2 mmol/L      eGFR 79.5 mL/min/1.73      Comment: National Kidney Foundation and American Society of Nephrology (ASN) Task Force recommended calculation based on the Chronic Kidney Disease Epidemiology Collaboration (CKD-EPI) equation refit without adjustment for race.       Narrative:      GFR Normal >60  Chronic Kidney Disease <60  Kidney Failure <15      Lipase [[id number]]  (Normal) Collected: 11/20/22 2031    Specimen: Blood Updated: 11/20/22 2109     Lipase 34 U/L     BNP [[id number]]  (Normal) Collected: 11/20/22 2031    Specimen: Blood Updated: 11/20/22 2105     proBNP 52.3 pg/mL     Narrative:      Among patients with dyspnea, NT-proBNP is highly sensitive for the detection of acute congestive heart failure. In addition NT-proBNP of <300 pg/ml effectively rules out acute congestive heart failure with 99% negative predictive value.      Magnesium [[id number]]  (Normal) Collected: 11/20/22 2031    Specimen: Blood Updated: 11/20/22 2109     Magnesium 2.1 mg/dL     CBC Auto Differential [[id number]]  (Abnormal) Collected: 11/20/22 2031    Specimen: Blood Updated: 11/20/22 2121     WBC 8.27 10*3/mm3      RBC 4.96 10*6/mm3      Hemoglobin 11.0 g/dL      Hematocrit 36.0 %      MCV 72.6 fL      MCH 22.2 pg      MCHC 30.6 g/dL      RDW 14.0 %      RDW-SD 36.6 fl      MPV 9.7 fL      Platelets 297 10*3/mm3      Neutrophil % 56.6 %      Lymphocyte % 31.8 %      Monocyte % 8.3 %      Eosinophil % 3.0 %      Basophil % 0.1 %      Immature Grans % 0.2 %      Neutrophils, Absolute 4.67 10*3/mm3      Lymphocytes, Absolute 2.63 10*3/mm3      Monocytes, Absolute 0.69 10*3/mm3      Eosinophils, Absolute 0.25 10*3/mm3      Basophils, Absolute 0.01 10*3/mm3      Immature Grans, Absolute 0.02 10*3/mm3      nRBC 0.0 /100 WBC     Scan Slide [[id number]] Collected: 11/20/22 2031    Specimen: Blood Updated: 11/20/22 2121     Microcytes Mod/2+      WBC Morphology Normal     Platelet Estimate Adequate    POC Troponin I [[id number]]  (Normal) Collected: 11/20/22 2040    Specimen: Blood Updated: 11/20/22 2053     Troponin I 0.00 ng/mL      Comment: Serial Number: [id number]:  [id number]              Imaging:  XR Chest 1 View    Result Date: 11/20/2022  PROCEDURE: XR CHEST 1 VW  COMPARISON: None.  INDICATIONS: Chest pain.  FINDINGS: A single frontal (AP or PA upright portable) chest radiograph reveals borderline cardiac enlargement and no acute infiltrate. No pneumothorax is seen.  The thoracic aorta is atherosclerotic and mildly ectatic.  There may be mild subsegmental atelectasis in the lung bases.  Mild dextroscoliosis of the upper thoracic spine is possible.  Chronic calcified granulomatous disease may involve the chest.        No acute infiltrate is appreciated.     Please note that portions of this note were completed with a voice recognition program.  TREV SOLIS JR, MD       Electronically Signed and Approved By: TREV SOLIS JR, MD on 11/20/2022 at 21:26                Procedures:  Procedures    Progress  ED Course as of 11/20/22 2211   Sun Nov 20, 2022 2041 EKG: I have reviewed and interpreted her twelve-lead EKG as normal sinus rhythm at 77 bpm, normal P waves, QRS showing LVH, minimal ST depressions and T wave abnormalities in leads V3 and V4 and V5; possible ischemia, but no old EKG available for comparison now. [VS]      ED Course User Index  [VS] Jarvis Bustamante MD                              Medical Decision Making:  MDM       For my differential diagnosis of this patient's chest pain I considered acute coronary syndrome, pulmonary embolism, musculoskeletal chest wall pain, acid reflux with esophagitis, thoracic muscle strain, or anxiety induced chest pain.        This patient is a pleasant and healthy-appearing 67-year-old female presenting with chest pain, that started following a bout of upper abdominal pain and vomiting yesterday after  eating some eggs.    I consider she could have had food poisoning initially or stomach virus most likely.    She has some mild residual soreness in her chest today.    It is been going on all day and her initial troponin is 0.00 and her pain sounds more like indigestion and acid reflux and esophagitis related symptoms.    Chest x-ray was normal and cardiopulmonary exam is normal.    She has a low heart score given atypical symptoms and she has no cardiac risk factors other than her age.    I think she looks stable to be discharged home with supportive care instructions and follow-up with her PCP as needed.      Final diagnoses:   Atypical chest pain   Esophagitis, acute   Nausea and vomiting, unspecified vomiting type   Elevated LFTs          Disposition:  ED Disposition     ED Disposition   Discharge    Condition   Stable    Comment   --             This medical record created using voice recognition software.           Alonso Guillory Jr.  11/20/22 2050       Jarvis Bustamante MD  11/20/22 3783

## 2022-12-05 ENCOUNTER — LAB (OUTPATIENT)
Dept: LAB | Facility: HOSPITAL | Age: 67
End: 2022-12-05

## 2022-12-05 ENCOUNTER — OFFICE VISIT (OUTPATIENT)
Dept: FAMILY MEDICINE CLINIC | Facility: CLINIC | Age: 67
End: 2022-12-05

## 2022-12-05 VITALS
RESPIRATION RATE: 16 BRPM | WEIGHT: 198.8 LBS | HEART RATE: 84 BPM | BODY MASS INDEX: 28.46 KG/M2 | DIASTOLIC BLOOD PRESSURE: 80 MMHG | HEIGHT: 70 IN | OXYGEN SATURATION: 98 % | TEMPERATURE: 96.7 F | SYSTOLIC BLOOD PRESSURE: 126 MMHG

## 2022-12-05 DIAGNOSIS — E53.8 B12 DEFICIENCY: ICD-10-CM

## 2022-12-05 DIAGNOSIS — Z13.6 ENCOUNTER FOR SCREENING FOR CARDIOVASCULAR DISORDERS: ICD-10-CM

## 2022-12-05 DIAGNOSIS — D50.9 IRON DEFICIENCY ANEMIA, UNSPECIFIED IRON DEFICIENCY ANEMIA TYPE: ICD-10-CM

## 2022-12-05 DIAGNOSIS — M19.90 ARTHRITIS: ICD-10-CM

## 2022-12-05 DIAGNOSIS — E55.9 VITAMIN D DEFICIENCY: ICD-10-CM

## 2022-12-05 DIAGNOSIS — Z11.59 NEED FOR HEPATITIS C SCREENING TEST: ICD-10-CM

## 2022-12-05 DIAGNOSIS — E55.9 VITAMIN D DEFICIENCY: Primary | ICD-10-CM

## 2022-12-05 LAB
25(OH)D3 SERPL-MCNC: 46.1 NG/ML (ref 30–100)
ALBUMIN SERPL-MCNC: 4.1 G/DL (ref 3.5–5.2)
ALBUMIN/GLOB SERPL: 1.2 G/DL
ALP SERPL-CCNC: 82 U/L (ref 39–117)
ALT SERPL W P-5'-P-CCNC: 20 U/L (ref 1–33)
ANION GAP SERPL CALCULATED.3IONS-SCNC: 12.1 MMOL/L (ref 5–15)
AST SERPL-CCNC: 16 U/L (ref 1–32)
BILIRUB SERPL-MCNC: 0.2 MG/DL (ref 0–1.2)
BUN SERPL-MCNC: 13 MG/DL (ref 8–23)
BUN/CREAT SERPL: 21.3 (ref 7–25)
CALCIUM SPEC-SCNC: 9.4 MG/DL (ref 8.6–10.5)
CHLORIDE SERPL-SCNC: 105 MMOL/L (ref 98–107)
CHOLEST SERPL-MCNC: 171 MG/DL (ref 0–200)
CO2 SERPL-SCNC: 25.9 MMOL/L (ref 22–29)
CREAT SERPL-MCNC: 0.61 MG/DL (ref 0.57–1)
EGFRCR SERPLBLD CKD-EPI 2021: 99.4 ML/MIN/1.73
FERRITIN SERPL-MCNC: 148 NG/ML (ref 13–150)
FOLATE SERPL-MCNC: 11.9 NG/ML (ref 4.78–24.2)
GLOBULIN UR ELPH-MCNC: 3.4 GM/DL
GLUCOSE SERPL-MCNC: 79 MG/DL (ref 65–99)
HCV AB SER DONR QL: NORMAL
HDLC SERPL QL: 2.85
HDLC SERPL-MCNC: 60 MG/DL (ref 40–60)
IRON 24H UR-MRATE: 49 MCG/DL (ref 37–145)
IRON SATN MFR SERPL: 16 % (ref 20–50)
LDLC SERPL CALC-MCNC: 101 MG/DL (ref 0–100)
POTASSIUM SERPL-SCNC: 4 MMOL/L (ref 3.5–5.2)
PROT SERPL-MCNC: 7.5 G/DL (ref 6–8.5)
SODIUM SERPL-SCNC: 143 MMOL/L (ref 136–145)
TIBC SERPL-MCNC: 313 MCG/DL (ref 298–536)
TRANSFERRIN SERPL-MCNC: 210 MG/DL (ref 200–360)
TRIGL SERPL-MCNC: 52 MG/DL (ref 0–150)
VIT B12 BLD-MCNC: 1308 PG/ML (ref 211–946)
VLDLC SERPL-MCNC: 10 MG/DL (ref 5–40)

## 2022-12-05 PROCEDURE — 82728 ASSAY OF FERRITIN: CPT

## 2022-12-05 PROCEDURE — 36415 COLL VENOUS BLD VENIPUNCTURE: CPT

## 2022-12-05 PROCEDURE — 82746 ASSAY OF FOLIC ACID SERUM: CPT

## 2022-12-05 PROCEDURE — 82607 VITAMIN B-12: CPT

## 2022-12-05 PROCEDURE — 86803 HEPATITIS C AB TEST: CPT

## 2022-12-05 PROCEDURE — 80053 COMPREHEN METABOLIC PANEL: CPT

## 2022-12-05 PROCEDURE — 80061 LIPID PANEL: CPT

## 2022-12-05 PROCEDURE — 84466 ASSAY OF TRANSFERRIN: CPT

## 2022-12-05 PROCEDURE — 83540 ASSAY OF IRON: CPT

## 2022-12-05 PROCEDURE — 82306 VITAMIN D 25 HYDROXY: CPT

## 2022-12-05 PROCEDURE — 99214 OFFICE O/P EST MOD 30 MIN: CPT | Performed by: NURSE PRACTITIONER

## 2022-12-05 NOTE — PROGRESS NOTES
Chief Complaint  Vitamin b12 def, Vitamin D Deficiency, and Anemia    Subjective        Meliza Roberts presents to [facility]  History of Present Illness  Vitamin B12:   Doing well and    Vitamin D deficiency    Anemia:    Arthritis:  Doing well on medication and needs refill of diclofenac.  Anemia  There has been no fever or palpitations.         Past History:    Medical History: has a past medical history of Aftercare following right knee joint replacement surgery (01/29/2018), Allergic conjunctivitis and rhinitis, Anemia, Arthritis, Hepatitis, History of total knee arthroplasty, right (05/16/2018), Limb swelling, Lipoma of arm (06/15/2020), Seasonal allergies, and Sinus trouble.     Surgical History: has a past surgical history that includes Other surgical history; Colonoscopy (06/2017); Breast mass excision (06/06/1997); Hysterectomy (2006); Ankle surgery; Anterior cruciate ligament repair (Bilateral, 1989/1991/2010); Tubal ligation (09/14/1993); and Rotator cuff repair (Left, 01/27/2021).     Family History: family history includes Arthritis in her brother and mother; Cancer in her mother; Diabetes in her brother, father, and sister; Heart disease in her father, maternal grandmother, and mother; Multiple myeloma (age of onset: 23) in her brother; Stroke in her father.     Social History: reports that she has never smoked. She has never used smokeless tobacco. She reports that she does not drink alcohol and does not use drugs.    Allergies: Drug ingredient [cephalexin], Clindamycin/lincomycin, and Sulfa antibiotics          Current Outpatient Medications:   •  acetaminophen (TYLENOL) 650 MG 8 hr tablet, Tylenol Arthritis Pain 650 mg oral tablet extended release take 2 tablets by oral route every 8 hours   Active, Disp: , Rfl:   •  calcium carbonate-cholecalciferol 500-400 MG-UNIT tablet tablet, Take  by mouth Daily., Disp: , Rfl:   •  diclofenac (VOLTAREN) 50 MG EC  "tablet, Take 1 tablet by mouth 2 (Two) Times a Day., Disp: 180 tablet, Rfl: 1  •  Diclofenac Sodium (VOLTAREN) 1 % gel gel, Apply  topically to the appropriate area as directed 2 (Two) Times a Day., Disp: 450 g, Rfl: 1  •  ferrous sulfate 324 (65 Fe) MG tablet delayed-release EC tablet, Take 324 mg by mouth Daily With Breakfast., Disp: , Rfl:   •  vitamin B-12 (CYANOCOBALAMIN) 500 MCG tablet, Take 500 mcg by mouth Daily., Disp: , Rfl:   •  vitamin D3 125 MCG (5000 UT) capsule capsule, Take 5,000 Units by mouth Daily., Disp: , Rfl:   •  chlorhexidine (PERIDEX) 0.12 % solution, RINSE MOUTH WITH 15 ML (1 CAPFUL) FOR 30 SECONDS IN THE MORNING AND EVENING AFTER TOOTHBRUSHING. EXPECTORATE AFTER RINSING, DO NOT SWALLOW, Disp: , Rfl:   •  clindamycin (CLEOCIN) 150 MG capsule, Take 1 capsule by mouth 4 (Four) Times a Day., Disp: 28 capsule, Rfl: 0  •  ibuprofen (ADVIL,MOTRIN) 200 MG tablet, Take 200 mg by mouth Every 6 (Six) Hours As Needed for Mild Pain ., Disp: , Rfl:   •  methylPREDNISolone (MEDROL) 4 MG dose pack, Take as directed on package instructions., Disp: 21 each, Rfl: 0  •  ondansetron ODT (ZOFRAN-ODT) 4 MG disintegrating tablet, Place 1 tablet on the tongue Every 6 (Six) Hours As Needed for Nausea or Vomiting., Disp: 12 tablet, Rfl: 0    Medications Discontinued During This Encounter   Medication Reason   • Diclofenac Sodium (VOLTAREN) 1 % gel gel Reorder         Review of Systems   Constitutional: Negative for fever.   Respiratory: Negative for cough and shortness of breath.    Cardiovascular: Negative for chest pain, palpitations and leg swelling.   Neurological: Negative for dizziness, weakness, numbness and headache.        Objective         Vitals:    12/05/22 0714   BP: 126/80   BP Location: Right arm   Patient Position: Sitting   Cuff Size: Adult   Pulse: 84   Resp: 16   Temp: 96.7 °F (35.9 °C)   TempSrc: Temporal   SpO2: 98%   Weight: 90.2 kg (198 lb 12.8 oz)   Height: 177.8 cm (70\")     Body mass index " is 28.52 kg/m².         Physical Exam  Vitals reviewed.   Constitutional:       Appearance: Normal appearance. She is well-developed.   HENT:      Head: Normocephalic and atraumatic.      Mouth/Throat:      Pharynx: No oropharyngeal exudate.   Eyes:      Conjunctiva/sclera: Conjunctivae normal.      Pupils: Pupils are equal, round, and reactive to light.   Cardiovascular:      Rate and Rhythm: Normal rate and regular rhythm.      Heart sounds: Normal heart sounds. No murmur heard.    No friction rub. No gallop.   Pulmonary:      Effort: Pulmonary effort is normal.      Breath sounds: Normal breath sounds. No wheezing or rhonchi.   Skin:     General: Skin is warm and dry.   Neurological:      Mental Status: She is alert and oriented to person, place, and time.   Psychiatric:         Mood and Affect: Mood and affect normal.         Behavior: Behavior normal.         Thought Content: Thought content normal.         Judgment: Judgment normal.             Result Review :               Assessment and Plan     Diagnoses and all orders for this visit:    1. Vitamin D deficiency (Primary)  -     Vitamin D 25 hydroxy; Future    2. Arthritis  -     Diclofenac Sodium (VOLTAREN) 1 % gel gel; Apply  topically to the appropriate area as directed 2 (Two) Times a Day.  Dispense: 450 g; Refill: 1    3. B12 deficiency  -     Vitamin B12; Future    4. Iron deficiency anemia, unspecified iron deficiency anemia type  -     Comprehensive metabolic panel; Future  -     CBC w AUTO Differential; Future  -     Ferritin; Future    5. Need for hepatitis C screening test  -     Hepatitis C antibody; Future    6. Encounter for screening for cardiovascular disorders  -     Lipid Panel w/ Chol/HDL Ratio; Future              Follow Up     Return in about 6 months (around 6/5/2023).    Patient was given instructions and counseling regarding her condition or for health maintenance advice. Please see specific information pulled into the AVS if  appropriate.

## 2023-01-03 ENCOUNTER — OFFICE VISIT (OUTPATIENT)
Dept: FAMILY MEDICINE CLINIC | Facility: CLINIC | Age: 68
End: 2023-01-03
Payer: COMMERCIAL

## 2023-01-03 ENCOUNTER — HOSPITAL ENCOUNTER (OUTPATIENT)
Dept: GENERAL RADIOLOGY | Facility: HOSPITAL | Age: 68
Discharge: HOME | End: 2023-01-03
Payer: MEDICARE

## 2023-01-03 VITALS
DIASTOLIC BLOOD PRESSURE: 80 MMHG | OXYGEN SATURATION: 99 % | TEMPERATURE: 97.5 F | BODY MASS INDEX: 28.45 KG/M2 | WEIGHT: 198.7 LBS | SYSTOLIC BLOOD PRESSURE: 122 MMHG | HEART RATE: 77 BPM | HEIGHT: 70 IN

## 2023-01-03 DIAGNOSIS — M54.50 LOW BACK PAIN RADIATING TO RIGHT LOWER EXTREMITY: ICD-10-CM

## 2023-01-03 DIAGNOSIS — M70.71 ILIOPSOAS BURSITIS OF RIGHT HIP: ICD-10-CM

## 2023-01-03 DIAGNOSIS — M79.604 LOW BACK PAIN RADIATING TO RIGHT LOWER EXTREMITY: ICD-10-CM

## 2023-01-03 DIAGNOSIS — M70.71 ILIOPSOAS BURSITIS OF RIGHT HIP: Primary | ICD-10-CM

## 2023-01-03 PROCEDURE — 72100 X-RAY EXAM L-S SPINE 2/3 VWS: CPT

## 2023-01-03 PROCEDURE — 1160F RVW MEDS BY RX/DR IN RCRD: CPT | Performed by: STUDENT IN AN ORGANIZED HEALTH CARE EDUCATION/TRAINING PROGRAM

## 2023-01-03 PROCEDURE — 73502 X-RAY EXAM HIP UNI 2-3 VIEWS: CPT

## 2023-01-03 PROCEDURE — 99214 OFFICE O/P EST MOD 30 MIN: CPT | Performed by: STUDENT IN AN ORGANIZED HEALTH CARE EDUCATION/TRAINING PROGRAM

## 2023-01-03 PROCEDURE — 1159F MED LIST DOCD IN RCRD: CPT | Performed by: STUDENT IN AN ORGANIZED HEALTH CARE EDUCATION/TRAINING PROGRAM

## 2023-01-03 RX ORDER — METHOCARBAMOL 750 MG/1
750 TABLET, FILM COATED ORAL 4 TIMES DAILY PRN
Qty: 30 TABLET | Refills: 1 | Status: SHIPPED | OUTPATIENT
Start: 2023-01-03

## 2023-01-03 NOTE — PROGRESS NOTES
Chief Complaint  Right lower back/right hip pain    Subjective         Meliza Roberts is a 67 y.o. female who presents to [facility]    67 years old comes to the clinic today for an acute visit.    Reports chronic history of right hip/back pain but since last 3 weeks, it has been getting worse.  Patient reports worsening pain after sitting for long time and taking stairs, radiating from right lower back/right hip to all the way up to mid shin on right.  Denies any weakness/numbness/tingling.  Denies any recent trauma, does work desk job.  Has used diclofenac oral but no significant improvement noted.  Denies any abdominal symptoms/urinary changes or bowel movement changes.    Objective   Vital Signs:   Vitals:    01/03/23 0723   BP: 122/80   BP Location: Left arm   Patient Position: Sitting   Cuff Size: Adult   Pulse: 77   Temp: 97.5 °F (36.4 °C)   TempSrc: Temporal   SpO2: 99%   Weight: 90.1 kg (198 lb 11.2 oz)   Height: 177.8 cm (70\")      Body mass index is 28.51 kg/m².   Wt Readings from Last 3 Encounters:   01/03/23 90.1 kg (198 lb 11.2 oz)   12/05/22 90.2 kg (198 lb 12.8 oz)   11/20/22 90.2 kg (198 lb 13.7 oz)      BP Readings from Last 3 Encounters:   01/03/23 122/80   12/05/22 126/80   11/20/22 108/68        Patient Care Team:  Bishnu Figueroa APRN as PCP - General (Nurse Practitioner)     Physical Exam  Vitals reviewed.   Constitutional:       Appearance: Normal appearance. She is well-developed.   HENT:      Head: Normocephalic and atraumatic.      Right Ear: External ear normal.      Left Ear: External ear normal.      Mouth/Throat:      Pharynx: No oropharyngeal exudate.   Eyes:      Conjunctiva/sclera: Conjunctivae normal.      Pupils: Pupils are equal, round, and reactive to light.   Cardiovascular:      Rate and Rhythm: Normal rate and regular rhythm.      Heart sounds: No murmur heard.    No friction rub. No gallop.   Pulmonary:      Effort: Pulmonary effort  is normal.      Breath sounds: Normal breath sounds. No wheezing or rhonchi.   Abdominal:      General: Bowel sounds are normal. There is no distension.      Palpations: Abdomen is soft.      Tenderness: There is no abdominal tenderness.   Musculoskeletal:      Comments: Some discomfort noted with internal rotation of right hip   Skin:     General: Skin is warm and dry.   Neurological:      Mental Status: She is alert and oriented to person, place, and time.      Cranial Nerves: No cranial nerve deficit.   Psychiatric:         Mood and Affect: Mood and affect normal.         Behavior: Behavior normal.         Thought Content: Thought content normal.         Judgment: Judgment normal.                       Assessment and Plan   Diagnoses and all orders for this visit:    1. Iliopsoas bursitis of right hip (Primary)  -     XR Hip With or Without Pelvis 2 - 3 View Right; Future  -     XR Spine Lumbar 2 or 3 View; Future  -     methocarbamol (ROBAXIN) 750 MG tablet; Take 1 tablet by mouth 4 (Four) Times a Day As Needed for Muscle Spasms.  Dispense: 30 tablet; Refill: 1    2. Low back pain radiating to right lower extremity  -     XR Hip With or Without Pelvis 2 - 3 View Right; Future  -     XR Spine Lumbar 2 or 3 View; Future  -     methocarbamol (ROBAXIN) 750 MG tablet; Take 1 tablet by mouth 4 (Four) Times a Day As Needed for Muscle Spasms.  Dispense: 30 tablet; Refill: 1    Primary conservative management discussed including diclofenac/stretching exercises/heating pad-icing.  Lifestyle modifications recommended.  Patient already has appointment with orthopedics/Dr. Wang.  Patient to call if not improved or any worsening, follow-up with Dr. Wang and may need to further look into lower back if not improved or any worsening.  May consider MRI of lower back and hip in future/physical therapy.        Tobacco Use: Low Risk    • Smoking Tobacco Use: Never   • Smokeless Tobacco Use: Never   • Passive Exposure: Not on file             Follow Up   Return if symptoms worsen or fail to improve.  Patient was given instructions and counseling regarding her condition or for health maintenance advice. Please see specific information pulled into the AVS if appropriate.

## 2023-01-04 ENCOUNTER — CLINICAL SUPPORT (OUTPATIENT)
Dept: FAMILY MEDICINE CLINIC | Facility: CLINIC | Age: 68
End: 2023-01-04
Payer: MEDICARE

## 2023-01-04 DIAGNOSIS — Z23 NEED FOR COVID-19 VACCINE: Primary | ICD-10-CM

## 2023-01-04 PROCEDURE — 91312 COVID-19 (PFIZER) BIVALENT BOOSTER 12+YRS: CPT | Performed by: NURSE PRACTITIONER

## 2023-01-04 PROCEDURE — 0124A PR ADM SARSCOV2 30MCG/0.3ML BST: CPT | Performed by: NURSE PRACTITIONER

## 2023-01-09 ENCOUNTER — OFFICE VISIT (OUTPATIENT)
Dept: ORTHOPEDIC SURGERY | Facility: CLINIC | Age: 68
End: 2023-01-09
Payer: COMMERCIAL

## 2023-01-09 VITALS — BODY MASS INDEX: 28.35 KG/M2 | WEIGHT: 198 LBS | HEIGHT: 70 IN

## 2023-01-09 DIAGNOSIS — M70.71 BURSITIS OF RIGHT HIP, UNSPECIFIED BURSA: Primary | ICD-10-CM

## 2023-01-09 PROCEDURE — 99213 OFFICE O/P EST LOW 20 MIN: CPT | Performed by: ORTHOPAEDIC SURGERY

## 2023-01-09 NOTE — PROGRESS NOTES
"Chief Complaint  Initial Evaluation of the Right Hip     Subjective      Meliza Roberts presents to [facility] for initial evaluation of the right hip.  She has been having pain the last 2-3 months and worse the last couple of days. She is having difficulty with certain movements and transition from sit to stand. She had no remembrance of what happened to make it start hurting.      Allergies   Allergen Reactions   • Drug Ingredient [Cephalexin] Rash     Itching   • Clindamycin/Lincomycin Hives   • Sulfa Antibiotics Urinary Retention        Social History     Socioeconomic History   • Marital status:    Tobacco Use   • Smoking status: Never   • Smokeless tobacco: Never   Vaping Use   • Vaping Use: Never used   Substance and Sexual Activity   • Alcohol use: Never     Comment: does not drink   • Drug use: Never   • Sexual activity: Not Currently        Review of Systems     Objective   Vital Signs:   Ht 177.8 cm (70\")   Wt 89.8 kg (198 lb)   BMI 28.41 kg/m²       Physical Exam  Constitutional:       Appearance: Normal appearance. Patient is well-developed and normal weight.   HENT:      Head: Normocephalic.      Right Ear: Hearing and external ear normal.      Left Ear: Hearing and external ear normal.      Nose: Nose normal.   Eyes:      Conjunctiva/sclera: Conjunctivae normal.   Cardiovascular:      Rate and Rhythm: Normal rate.   Pulmonary:      Effort: Pulmonary effort is normal.      Breath sounds: No wheezing or rales.   Abdominal:      Palpations: Abdomen is soft.      Tenderness: There is no abdominal tenderness.   Musculoskeletal:      Cervical back: Normal range of motion.   Skin:     Findings: No rash.   Neurological:      Mental Status: Patient  is alert and oriented to person, place, and time.   Psychiatric:         Mood and Affect: Mood and affect normal.         Judgment: Judgment normal.       Ortho Exam      RIGHT HIP Dorsal pedal pulse 2+. Posterior " tibialis pulse is 2+. Good strength to hamstrings, quadriceps, dorsiflexors, and plantar flexors. No groin pain.  Sensation intact. Neurovascular Intact. No swelling. No skin discoloration or muscle atrophy. Calf supple.  No signs and symptoms of DVT. Full ROM.     Procedures        Imaging Results (Most Recent)     None           Result Review :             Assessment and Plan     Diagnoses and all orders for this visit:    1. Bursitis of right hip, unspecified bursa (Primary)        Discussed the treatment plan with the patient. Discussed conservative measures as exercises, anti-inflammatory and injection. HEP exercises and right hip steroid injection. Discussed activity modification to prevent further injury and decrease pain.      Call or return if worsening symptoms.    Follow Up     PRN      Patient was given instructions and counseling regarding her condition or for health maintenance advice. Please see specific information pulled into the AVS if appropriate.     Scribed for Oc Wang MD by Marily Ham MA.  01/09/23   15:32 EST    I have personally performed the services described in this document as scribed by the above individual and it is both accurate and complete. Oc Wang MD 01/11/23

## 2023-02-24 ENCOUNTER — HOSPITAL ENCOUNTER (OUTPATIENT)
Dept: MAMMOGRAPHY | Facility: HOSPITAL | Age: 68
Discharge: HOME | End: 2023-02-24
Admitting: NURSE PRACTITIONER
Payer: MEDICARE

## 2023-02-24 DIAGNOSIS — Z12.31 ENCOUNTER FOR SCREENING MAMMOGRAM FOR MALIGNANT NEOPLASM OF BREAST: ICD-10-CM

## 2023-02-24 PROCEDURE — 77063 BREAST TOMOSYNTHESIS BI: CPT

## 2023-02-24 PROCEDURE — 77067 SCR MAMMO BI INCL CAD: CPT

## 2023-06-16 ENCOUNTER — OFFICE VISIT (OUTPATIENT)
Dept: FAMILY MEDICINE CLINIC | Facility: CLINIC | Age: 68
End: 2023-06-16
Payer: MEDICARE

## 2023-06-16 VITALS
RESPIRATION RATE: 16 BRPM | DIASTOLIC BLOOD PRESSURE: 80 MMHG | BODY MASS INDEX: 29.29 KG/M2 | TEMPERATURE: 97.2 F | OXYGEN SATURATION: 98 % | SYSTOLIC BLOOD PRESSURE: 116 MMHG | HEIGHT: 70 IN | HEART RATE: 90 BPM | WEIGHT: 204.6 LBS

## 2023-06-16 DIAGNOSIS — E53.8 B12 DEFICIENCY: ICD-10-CM

## 2023-06-16 DIAGNOSIS — M79.604 LOW BACK PAIN RADIATING TO RIGHT LOWER EXTREMITY: ICD-10-CM

## 2023-06-16 DIAGNOSIS — M70.71 ILIOPSOAS BURSITIS OF RIGHT HIP: ICD-10-CM

## 2023-06-16 DIAGNOSIS — M54.50 LOW BACK PAIN RADIATING TO RIGHT LOWER EXTREMITY: ICD-10-CM

## 2023-06-16 DIAGNOSIS — E78.00 ELEVATED LDL CHOLESTEROL LEVEL: ICD-10-CM

## 2023-06-16 DIAGNOSIS — M19.90 ARTHRITIS: ICD-10-CM

## 2023-06-16 DIAGNOSIS — D50.9 IRON DEFICIENCY ANEMIA, UNSPECIFIED IRON DEFICIENCY ANEMIA TYPE: ICD-10-CM

## 2023-06-16 DIAGNOSIS — E55.9 VITAMIN D DEFICIENCY: Primary | ICD-10-CM

## 2023-06-16 RX ORDER — METHOCARBAMOL 750 MG/1
750 TABLET, FILM COATED ORAL 4 TIMES DAILY PRN
Qty: 30 TABLET | Refills: 2 | Status: SHIPPED | OUTPATIENT
Start: 2023-06-16

## 2023-06-16 NOTE — PROGRESS NOTES
Chief Complaint  Vitamin D Deficiency, vitamin b12 def, and Anemia    Subjective        Meliza Roberts presents to [facility]  History of Present Illness  Vitamin B12:   Doing well and and not having issues.    Vitamin D deficiency    Anemia:  doing well and not having any issue.    Arthritis:  Doing well on medication and needs refill of diclofenac.  Anemia  There has been no fever or palpitations.     The following portions of the patient's history were personally reviewed and updated as appropriate: allergies, current medications, past medical history, past surgical history, past family history, and past social history.     Body mass index is 29.36 kg/m².    BMI is >= 25 and <30. (Overweight) The following options were offered after discussion;: weight loss educational material (shared in after visit summary)      Past History:    Medical History: has a past medical history of Aftercare following right knee joint replacement surgery (01/29/2018), Allergic conjunctivitis and rhinitis, Anemia, Arthritis, Hepatitis, History of total knee arthroplasty, right (05/16/2018), Limb swelling, Lipoma of arm (06/15/2020), Seasonal allergies, and Sinus trouble.     Surgical History: has a past surgical history that includes Other surgical history; Colonoscopy (06/2017); Breast mass excision (06/06/1997); Hysterectomy (2006); Ankle surgery; Anterior cruciate ligament repair (Bilateral, 1989/1991/2010); Tubal ligation (09/14/1993); and Rotator cuff repair (Left, 01/27/2021).     Family History: family history includes Arthritis in her brother and mother; Cancer in her mother; Diabetes in her brother, father, and sister; Heart disease in her father, maternal grandmother, and mother; Multiple myeloma (age of onset: 23) in her brother; Stroke in her father.     Social History: reports that she has never smoked. She has never used smokeless tobacco. She reports that she does not drink  "alcohol and does not use drugs.    Allergies: Drug ingredient [cephalexin], Clindamycin/lincomycin, and Sulfa antibiotics          Current Outpatient Medications:     acetaminophen (TYLENOL) 650 MG 8 hr tablet, Tylenol Arthritis Pain 650 mg oral tablet extended release take 2 tablets by oral route every 8 hours   Active, Disp: , Rfl:     calcium carbonate-cholecalciferol 500-400 MG-UNIT tablet tablet, Take  by mouth Daily., Disp: , Rfl:     diclofenac (VOLTAREN) 50 MG EC tablet, Take 1 tablet by mouth 2 (Two) Times a Day., Disp: 180 tablet, Rfl: 1    Diclofenac Sodium (VOLTAREN) 1 % gel gel, Apply  topically to the appropriate area as directed 2 (Two) Times a Day., Disp: 450 g, Rfl: 1    ferrous sulfate 324 (65 Fe) MG tablet delayed-release EC tablet, Take 1 tablet by mouth Daily With Breakfast., Disp: , Rfl:     methocarbamol (ROBAXIN) 750 MG tablet, Take 1 tablet by mouth 4 (Four) Times a Day As Needed for Muscle Spasms., Disp: 30 tablet, Rfl: 2    vitamin B-12 (CYANOCOBALAMIN) 500 MCG tablet, Take 1 tablet by mouth Daily., Disp: , Rfl:     vitamin D3 125 MCG (5000 UT) capsule capsule, Take 1 capsule by mouth Daily., Disp: , Rfl:     Medications Discontinued During This Encounter   Medication Reason    methocarbamol (ROBAXIN) 750 MG tablet Reorder         Review of Systems   Constitutional:  Negative for fever.   Respiratory:  Negative for cough and shortness of breath.    Cardiovascular:  Negative for chest pain, palpitations and leg swelling.   Neurological:  Negative for dizziness, weakness, numbness and headache.      Objective         Vitals:    06/16/23 0833   BP: 116/80   BP Location: Right arm   Patient Position: Sitting   Cuff Size: Adult   Pulse: 90   Resp: 16   Temp: 97.2 °F (36.2 °C)   TempSrc: Temporal   SpO2: 98%   Weight: 92.8 kg (204 lb 9.6 oz)   Height: 177.8 cm (70\")     Body mass index is 29.36 kg/m².         Physical Exam  Vitals reviewed.   Constitutional:       Appearance: Normal appearance. " She is well-developed.   HENT:      Head: Normocephalic and atraumatic.      Mouth/Throat:      Pharynx: No oropharyngeal exudate.   Eyes:      Conjunctiva/sclera: Conjunctivae normal.      Pupils: Pupils are equal, round, and reactive to light.   Cardiovascular:      Rate and Rhythm: Normal rate and regular rhythm.      Heart sounds: Normal heart sounds. No murmur heard.    No friction rub. No gallop.   Pulmonary:      Effort: Pulmonary effort is normal.      Breath sounds: Normal breath sounds. No wheezing or rhonchi.   Skin:     General: Skin is warm and dry.   Neurological:      Mental Status: She is alert and oriented to person, place, and time.   Psychiatric:         Mood and Affect: Mood and affect normal.         Behavior: Behavior normal.         Thought Content: Thought content normal.         Judgment: Judgment normal.           Result Review :               Assessment and Plan     Diagnoses and all orders for this visit:    1. Vitamin D deficiency (Primary)  -     Vitamin D,25-Hydroxy; Future    2. Arthritis    3. Low back pain radiating to right lower extremity  -     methocarbamol (ROBAXIN) 750 MG tablet; Take 1 tablet by mouth 4 (Four) Times a Day As Needed for Muscle Spasms.  Dispense: 30 tablet; Refill: 2    4. Iliopsoas bursitis of right hip  -     methocarbamol (ROBAXIN) 750 MG tablet; Take 1 tablet by mouth 4 (Four) Times a Day As Needed for Muscle Spasms.  Dispense: 30 tablet; Refill: 2    5. B12 deficiency  -     Vitamin B12; Future    6. Iron deficiency anemia, unspecified iron deficiency anemia type  -     Comprehensive Metabolic Panel; Future  -     CBC (No Diff); Future  -     Urinalysis With Culture If Indicated -; Future  -     Ferritin; Future    7. Elevated LDL cholesterol level  -     Lipid Panel With / Chol / HDL Ratio; Future              Follow Up     Return in about 6 months (around 12/16/2023).    Patient was given instructions and counseling regarding her condition or for health  maintenance advice. Please see specific information pulled into the AVS if appropriate.

## 2023-06-16 NOTE — LETTER
June 16, 2023                 Dear [employer]:        This letter is to document that Meliza Roberts is a 67 year old female who has been diagnosed with polyarthralgia since 8/25/2011.  This diagnosis was determined by xray and medical procedures and joint replacements.     This condition is degenerative and chronic.  This disability substantially limits Meliza because of ability to walk , reach, lift, or stand.     Her functional limitations are standing or walking for over ten minutes at a time without sitting or resting.  Difficulty upon standing due to limited joint mobility.     The following reasonable accommodations may apply frequent breaks, sit/stand desk, ergonomic keyboard and mouse, foot rest, part time hours and time off for physical therapy and provider appointments.          Meliza will continue to need assistance through job support supplied by your organization.  This support may range from reasonable accommodations to counseling and vocational rehabilitation Meliza is an excellent candidate for your organization.              Sincerely,              Bishnu GRIJALVA

## 2023-07-12 ENCOUNTER — TELEPHONE (OUTPATIENT)
Dept: ORTHOPEDIC SURGERY | Facility: CLINIC | Age: 68
End: 2023-07-12

## 2023-09-19 DIAGNOSIS — M19.90 ARTHRITIS: ICD-10-CM

## 2023-09-25 ENCOUNTER — CLINICAL SUPPORT (OUTPATIENT)
Dept: FAMILY MEDICINE CLINIC | Facility: CLINIC | Age: 68
End: 2023-09-25

## 2023-09-25 DIAGNOSIS — Z23 NEED FOR INFLUENZA VACCINATION: Primary | ICD-10-CM

## 2023-09-25 PROCEDURE — 90686 IIV4 VACC NO PRSV 0.5 ML IM: CPT | Performed by: NURSE PRACTITIONER

## 2023-09-25 PROCEDURE — 90471 IMMUNIZATION ADMIN: CPT | Performed by: NURSE PRACTITIONER

## 2023-10-20 ENCOUNTER — OFFICE VISIT (OUTPATIENT)
Dept: FAMILY MEDICINE CLINIC | Facility: CLINIC | Age: 68
End: 2023-10-20
Payer: COMMERCIAL

## 2023-10-20 VITALS
BODY MASS INDEX: 30.78 KG/M2 | WEIGHT: 215 LBS | RESPIRATION RATE: 16 BRPM | SYSTOLIC BLOOD PRESSURE: 98 MMHG | HEIGHT: 70 IN | OXYGEN SATURATION: 95 % | DIASTOLIC BLOOD PRESSURE: 76 MMHG | TEMPERATURE: 96.4 F | HEART RATE: 81 BPM

## 2023-10-20 DIAGNOSIS — Z00.00 MEDICARE ANNUAL WELLNESS VISIT, SUBSEQUENT: Primary | ICD-10-CM

## 2023-10-20 NOTE — PROGRESS NOTES
The ABCs of the Annual Wellness Visit  Subsequent Medicare Wellness Visit    Subjective      Meliza Roberts is a 68 y.o. female who presents for a Subsequent Medicare Wellness Visit.    The following portions of the patient's history were reviewed and   updated as appropriate: allergies, current medications, past family history, past medical history, past social history, past surgical history, and problem list.    Compared to one year ago, the patient feels her physical   health is the same.    Compared to one year ago, the patient feels her mental   health is the same.    Recent Hospitalizations:  She has not been admitted within the past 365 days.      Current Medical Providers:  Patient Care Team:  Bishnu Figueroa APRN as PCP - General (Nurse Practitioner)    Outpatient Medications Prior to Visit   Medication Sig Dispense Refill    acetaminophen (TYLENOL) 650 MG 8 hr tablet Tylenol Arthritis Pain 650 mg oral tablet extended release take 2 tablets by oral route every 8 hours   Active      calcium carbonate-cholecalciferol 500-400 MG-UNIT tablet tablet Take  by mouth Daily.      diclofenac (VOLTAREN) 50 MG EC tablet Take 1 tablet by mouth 2 (Two) Times a Day. 180 tablet 1    Diclofenac Sodium (VOLTAREN) 1 % gel gel Apply  topically to the appropriate area as directed 2 (Two) Times a Day. 450 g 1    ferrous sulfate 324 (65 Fe) MG tablet delayed-release EC tablet Take 1 tablet by mouth Daily With Breakfast.      vitamin B-12 (CYANOCOBALAMIN) 500 MCG tablet Take 1 tablet by mouth Daily.      vitamin D3 125 MCG (5000 UT) capsule capsule Take 1 capsule by mouth Daily.      methocarbamol (ROBAXIN) 750 MG tablet Take 1 tablet by mouth 4 (Four) Times a Day As Needed for Muscle Spasms. 30 tablet 2     No facility-administered medications prior to visit.       No opioid medication identified on active medication list. I have reviewed chart for other potential  high risk medication/s and harmful drug interactions in  "the elderly.        Aspirin is not on active medication list.  Aspirin use is not indicated based on review of current medical condition/s. Risk of harm outweighs potential benefits.  .    Patient Active Problem List   Diagnosis    Aftercare following surgery of the musculoskeletal system    Chronic left shoulder pain    Allergic condition    Anemia    Arthritis    History of total knee arthroplasty, right    Seasonal allergic rhinitis    Sinus trouble    Lipoma of arm    Vitamin D deficiency    B12 deficiency     Advance Care Planning   Advance Care Planning     Advance Directive is on file.  ACP discussion was held with the patient during this visit. Patient has an advance directive in EMR which is still valid.      Objective    Vitals:    10/20/23 0651   BP: 98/76   BP Location: Right arm   Patient Position: Sitting   Cuff Size: Adult   Pulse: 81   Resp: 16   Temp: 96.4 °F (35.8 °C)   TempSrc: Temporal   SpO2: 95%   Weight: 97.5 kg (215 lb)   Height: 177.8 cm (70\")   PainSc: 0-No pain     Estimated body mass index is 30.85 kg/m² as calculated from the following:    Height as of this encounter: 177.8 cm (70\").    Weight as of this encounter: 97.5 kg (215 lb).           Does the patient have evidence of cognitive impairment?   No            HEALTH RISK ASSESSMENT    Smoking Status:  Social History     Tobacco Use   Smoking Status Never   Smokeless Tobacco Never     Alcohol Consumption:  Social History     Substance and Sexual Activity   Alcohol Use Never    Comment: does not drink     Fall Risk Screen:    STEADI Fall Risk Assessment was completed, and patient is at LOW risk for falls.Assessment completed on:10/20/2023    Depression Screening:      10/20/2023     6:55 AM   PHQ-2/PHQ-9 Depression Screening   Little Interest or Pleasure in Doing Things 0-->not at all   Feeling Down, Depressed or Hopeless 0-->not at all   PHQ-9: Brief Depression Severity Measure Score 0       Health Habits and Functional and Cognitive " Screening:      10/20/2023     6:57 AM   Functional & Cognitive Status   Do you have difficulty preparing food and eating? No   Do you have difficulty bathing yourself, getting dressed or grooming yourself? No   Do you have difficulty using the toilet? No   Do you have difficulty moving around from place to place? No   Do you have trouble with steps or getting out of a bed or a chair? No   Current Diet Well Balanced Diet   Dental Exam Up to date   Eye Exam Up to date   Exercise (times per week) 0 times per week   Current Exercises Include No Regular Exercise   Do you need help using the phone?  No   Are you deaf or do you have serious difficulty hearing?  No   Do you need help to go to places out of walking distance? No   Do you need help shopping? No   Do you need help preparing meals?  No   Do you need help with housework?  No   Do you need help with laundry? No   Do you need help taking your medications? No   Do you need help managing money? No   Do you ever drive or ride in a car without wearing a seat belt? No   Have you felt unusual stress, anger or loneliness in the last month? Yes   Who do you live with? Sibling   If you need help, do you have trouble finding someone available to you? No   Have you been bothered in the last four weeks by sexual problems? No   Do you have difficulty concentrating, remembering or making decisions? No       Age-appropriate Screening Schedule:  Refer to the list below for future screening recommendations based on patient's age, sex and/or medical conditions. Orders for these recommended tests are listed in the plan section. The patient has been provided with a written plan.    Health Maintenance   Topic Date Due    ANNUAL PHYSICAL  Never done    Pneumococcal Vaccine 65+ (1 - PCV) 10/20/2023 (Originally 8/25/2022)    COVID-19 Vaccine (6 - 2023-24 season) 12/12/2112 (Originally 9/1/2023)    BMI FOLLOWUP  06/16/2024    DXA SCAN  09/12/2024    MAMMOGRAM  02/24/2025    COLORECTAL  CANCER SCREENING  07/13/2027    TDAP/TD VACCINES (2 - Td or Tdap) 10/09/2029    HEPATITIS C SCREENING  Completed    INFLUENZA VACCINE  Completed    ZOSTER VACCINE  Completed                  CMS Preventative Services Quick Reference  Risk Factors Identified During Encounter:    None Identified    The above risks/problems have been discussed with the patient.  Pertinent information has been shared with the patient in the After Visit Summary.    Diagnoses and all orders for this visit:    1. Medicare annual wellness visit, subsequent (Primary)        Follow Up:   Next Medicare Wellness visit to be scheduled in 1 year.      An After Visit Summary and PPPS were made available to the patient.

## 2023-11-07 ENCOUNTER — CLINICAL SUPPORT (OUTPATIENT)
Dept: FAMILY MEDICINE CLINIC | Facility: CLINIC | Age: 68
End: 2023-11-07
Payer: COMMERCIAL

## 2023-11-07 DIAGNOSIS — Z23 NEED FOR COVID-19 VACCINE: Primary | ICD-10-CM

## 2024-02-19 ENCOUNTER — OFFICE VISIT (OUTPATIENT)
Dept: FAMILY MEDICINE CLINIC | Facility: CLINIC | Age: 69
End: 2024-02-19
Payer: MEDICARE

## 2024-02-19 VITALS
SYSTOLIC BLOOD PRESSURE: 110 MMHG | HEIGHT: 70 IN | HEART RATE: 81 BPM | WEIGHT: 212.8 LBS | DIASTOLIC BLOOD PRESSURE: 80 MMHG | BODY MASS INDEX: 30.46 KG/M2 | TEMPERATURE: 96.8 F | RESPIRATION RATE: 16 BRPM | OXYGEN SATURATION: 98 %

## 2024-02-19 DIAGNOSIS — Z12.31 ENCOUNTER FOR SCREENING MAMMOGRAM FOR MALIGNANT NEOPLASM OF BREAST: ICD-10-CM

## 2024-02-19 DIAGNOSIS — Z78.0 POSTMENOPAUSAL: ICD-10-CM

## 2024-02-19 DIAGNOSIS — M79.89 MASS OF SOFT TISSUE OF RIGHT UPPER EXTREMITY: ICD-10-CM

## 2024-02-19 DIAGNOSIS — H53.001 AMBLYOPIA OF EYE, RIGHT: Primary | ICD-10-CM

## 2024-02-19 DIAGNOSIS — K59.00 CONSTIPATION, UNSPECIFIED CONSTIPATION TYPE: ICD-10-CM

## 2024-02-19 PROCEDURE — 99213 OFFICE O/P EST LOW 20 MIN: CPT | Performed by: NURSE PRACTITIONER

## 2024-02-19 PROCEDURE — 1159F MED LIST DOCD IN RCRD: CPT | Performed by: NURSE PRACTITIONER

## 2024-02-19 PROCEDURE — 1160F RVW MEDS BY RX/DR IN RCRD: CPT | Performed by: NURSE PRACTITIONER

## 2024-02-19 NOTE — PROGRESS NOTES
Chief Complaint  Arm Pain (Right with swelling) and Amblyopia (Not sure if she needed a referral - new issue in the last 2 years.)    Subjective        Meliza Roberts presents to [facility]  History of Present Illness  Arm pain:  right side with pain    Amblyopia:  that is new  seems to move when staring at something.  Arm Pain   Pertinent negatives include no chest pain or numbness.       The following portions of the patient's history were personally reviewed and updated as appropriate: allergies, current medications, past medical history, past surgical history, past family history, and past social history.     Body mass index is 30.53 kg/m².           Past History:    Medical History: has a past medical history of Aftercare following right knee joint replacement surgery (01/29/2018), Allergic conjunctivitis and rhinitis, Anemia, Arthritis, Hepatitis, History of total knee arthroplasty, right (05/16/2018), Limb swelling, Lipoma of arm (06/15/2020), Seasonal allergies, and Sinus trouble.     Surgical History: has a past surgical history that includes Other surgical history; Colonoscopy (06/2017); Breast mass excision (06/06/1997); Hysterectomy (2006); Ankle surgery; Anterior cruciate ligament repair (Bilateral, 1989/1991/2010); Tubal ligation (09/14/1993); and Rotator cuff repair (Left, 01/27/2021).     Family History: family history includes Arthritis in her brother and mother; Cancer in her mother and sister; Diabetes in her brother, father, and sister; Heart disease in her father, maternal grandmother, and mother; Multiple myeloma (age of onset: 23) in her brother; Stroke in her father.     Social History: reports that she has never smoked. She has never used smokeless tobacco. She reports that she does not drink alcohol and does not use drugs.    Allergies: Drug ingredient [cephalexin], Clindamycin/lincomycin, and Sulfa antibiotics          Current Outpatient  "Medications:     acetaminophen (TYLENOL) 650 MG 8 hr tablet, Tylenol Arthritis Pain 650 mg oral tablet extended release take 2 tablets by oral route every 8 hours   Active, Disp: , Rfl:     calcium carbonate-cholecalciferol 500-400 MG-UNIT tablet tablet, Take  by mouth Daily., Disp: , Rfl:     diclofenac (VOLTAREN) 50 MG EC tablet, Take 1 tablet by mouth 2 (Two) Times a Day., Disp: 180 tablet, Rfl: 1    Diclofenac Sodium (VOLTAREN) 1 % gel gel, Apply  topically to the appropriate area as directed 2 (Two) Times a Day., Disp: 450 g, Rfl: 1    ferrous sulfate 324 (65 Fe) MG tablet delayed-release EC tablet, Take 1 tablet by mouth Daily With Breakfast., Disp: , Rfl:     vitamin B-12 (CYANOCOBALAMIN) 500 MCG tablet, Take 1 tablet by mouth Daily., Disp: , Rfl:     vitamin D3 125 MCG (5000 UT) capsule capsule, Take 1 capsule by mouth Daily., Disp: , Rfl:     There are no discontinued medications.      Review of Systems   Constitutional:  Negative for fever.   Respiratory:  Negative for cough and shortness of breath.    Cardiovascular:  Negative for chest pain, palpitations and leg swelling.   Neurological:  Negative for dizziness, weakness, numbness and headache.        Objective         Vitals:    02/19/24 0842   BP: 110/80   BP Location: Right arm   Patient Position: Sitting   Cuff Size: Large Adult   Pulse: 81   Resp: 16   Temp: 96.8 °F (36 °C)   TempSrc: Temporal   SpO2: 98%   Weight: 96.5 kg (212 lb 12.8 oz)   Height: 177.8 cm (70\")     Body mass index is 30.53 kg/m².         Physical Exam  Vitals reviewed.   Constitutional:       Appearance: Normal appearance. She is well-developed.   HENT:      Head: Normocephalic and atraumatic.      Mouth/Throat:      Pharynx: No oropharyngeal exudate.   Eyes:      Conjunctiva/sclera: Conjunctivae normal.      Pupils: Pupils are equal, round, and reactive to light.   Cardiovascular:      Rate and Rhythm: Normal rate and regular rhythm.      Heart sounds: Normal heart sounds. No " murmur heard.     No friction rub. No gallop.   Pulmonary:      Effort: Pulmonary effort is normal.      Breath sounds: Normal breath sounds. No wheezing or rhonchi.   Skin:     General: Skin is warm and dry.   Neurological:      Mental Status: She is alert and oriented to person, place, and time.   Psychiatric:         Mood and Affect: Mood and affect normal.         Behavior: Behavior normal.         Thought Content: Thought content normal.         Judgment: Judgment normal.             Result Review :               Assessment and Plan     Diagnoses and all orders for this visit:    1. Amblyopia of eye, right (Primary)  -     Ambulatory Referral to Ophthalmology    2. Mass of soft tissue of right upper extremity  -     Cancel: US Head Neck Soft Tissue; Future  -     Ambulatory Referral to General Surgery  -     US Soft Tissue; Future    3. Constipation, unspecified constipation type  Comments:  try smooth tea with senna to see if helps.    4. Postmenopausal  -     DEXA Bone Density Axial; Future    5. Encounter for screening mammogram for malignant neoplasm of breast  -     Mammo Screening Digital Tomosynthesis Bilateral With CAD; Future              Follow Up     Return in about 6 months (around 8/19/2024).    Patient was given instructions and counseling regarding her condition or for health maintenance advice. Please see specific information pulled into the AVS if appropriate.

## 2024-02-28 ENCOUNTER — HOSPITAL ENCOUNTER (OUTPATIENT)
Dept: ULTRASOUND IMAGING | Facility: HOSPITAL | Age: 69
Discharge: HOME | End: 2024-02-28
Admitting: NURSE PRACTITIONER
Payer: MEDICARE

## 2024-02-28 DIAGNOSIS — M79.89 MASS OF SOFT TISSUE OF RIGHT UPPER EXTREMITY: ICD-10-CM

## 2024-02-28 PROCEDURE — 76999 ECHO EXAMINATION PROCEDURE: CPT

## 2024-02-29 DIAGNOSIS — M79.89 MASS OF SOFT TISSUE OF RIGHT UPPER EXTREMITY: Primary | ICD-10-CM

## 2024-03-01 ENCOUNTER — HOSPITAL ENCOUNTER (OUTPATIENT)
Dept: MAMMOGRAPHY | Facility: HOSPITAL | Age: 69
Discharge: HOME | End: 2024-03-01
Admitting: NURSE PRACTITIONER
Payer: MEDICARE

## 2024-03-01 DIAGNOSIS — Z12.31 ENCOUNTER FOR SCREENING MAMMOGRAM FOR MALIGNANT NEOPLASM OF BREAST: ICD-10-CM

## 2024-03-01 PROCEDURE — 77063 BREAST TOMOSYNTHESIS BI: CPT

## 2024-03-01 PROCEDURE — 77067 SCR MAMMO BI INCL CAD: CPT

## 2024-03-11 ENCOUNTER — OFFICE VISIT (OUTPATIENT)
Dept: SURGERY | Facility: CLINIC | Age: 69
End: 2024-03-11
Payer: MEDICARE

## 2024-03-11 ENCOUNTER — PREP FOR SURGERY (OUTPATIENT)
Dept: OTHER | Facility: HOSPITAL | Age: 69
End: 2024-03-11
Payer: MEDICARE

## 2024-03-11 VITALS — WEIGHT: 215 LBS | HEIGHT: 70 IN | BODY MASS INDEX: 30.78 KG/M2

## 2024-03-11 DIAGNOSIS — R22.31 SUBCUTANEOUS MASS OF RIGHT UPPER EXTREMITY: Primary | ICD-10-CM

## 2024-03-11 PROCEDURE — 99203 OFFICE O/P NEW LOW 30 MIN: CPT | Performed by: SURGERY

## 2024-03-11 PROCEDURE — 1160F RVW MEDS BY RX/DR IN RCRD: CPT | Performed by: SURGERY

## 2024-03-11 PROCEDURE — 1159F MED LIST DOCD IN RCRD: CPT | Performed by: SURGERY

## 2024-03-11 NOTE — PROGRESS NOTES
Chief Complaint:  NEW PATIENT    Primary Care Provider: Bishnu Figueroa APRN    Referring Provider: Bishnu Figueroa APRN    History of Present Illness  Meliza Roberts is a 68 y.o. female referred by VALENTINE Cast for a soft tissue mass.  Send has a mass at her right proximal arm.  The mass is slowly increased in size.  Is been present for over a few years.  The mass is tender when pressure is placed over it.  Soft tissue ultrasound was done on 2/28/2024.  It showed a soft tissue mass at the proximal right arm just distal to the shoulder.  It was measured at 5.5 cm in size on the ultrasound.  MRI was ordered in case the physical exam was not concordant with a lipoma.    Allergies: Drug ingredient [cephalexin], Clindamycin/lincomycin, and Sulfa antibiotics    Outpatient Medications Marked as Taking for the 3/11/24 encounter (Office Visit) with Prosper Sosa MD   Medication Sig Dispense Refill    acetaminophen (TYLENOL) 650 MG 8 hr tablet Tylenol Arthritis Pain 650 mg oral tablet extended release take 2 tablets by oral route every 8 hours   Active      calcium carbonate-cholecalciferol 500-400 MG-UNIT tablet tablet Take  by mouth Daily.      diclofenac (VOLTAREN) 50 MG EC tablet Take 1 tablet by mouth 2 (Two) Times a Day. 180 tablet 1    Diclofenac Sodium (VOLTAREN) 1 % gel gel Apply  topically to the appropriate area as directed 2 (Two) Times a Day. 450 g 1    ferrous sulfate 324 (65 Fe) MG tablet delayed-release EC tablet Take 1 tablet by mouth Daily With Breakfast.      vitamin B-12 (CYANOCOBALAMIN) 500 MCG tablet Take 1 tablet by mouth Daily.      vitamin D3 125 MCG (5000 UT) capsule capsule Take 1 capsule by mouth Daily.         Past Medical History:    Aftercare following right knee joint replacement surgery    Allergic conjunctivitis and rhinitis    Anemia    Arthritis    Hepatitis    History of total knee arthroplasty, right    Limb swelling    Lipoma of arm    Seasonal allergies    Sinus  "trouble        Past Surgical History:    BREAST MASS EXCISION    w needle localization    COLONOSCOPY    HYSTERECTOMY    KNEE ACL RECONSTRUCTION    rt knee- 89'/10' left knee-91'    OTHER SURGICAL HISTORY    artificial joints/limbs    ROTATOR CUFF REPAIR    TUBAL ABDOMINAL LIGATION       Family History:   Family History   Problem Relation Age of Onset    Heart disease Mother     Cancer Mother     Arthritis Mother     Stroke Father     Heart disease Father     Diabetes Father     Diabetes Sister     Cancer Sister     Diabetes Brother     Multiple myeloma Brother 23    Arthritis Brother     Heart disease Maternal Grandmother         Social History:  Social History     Tobacco Use    Smoking status: Never     Passive exposure: Never    Smokeless tobacco: Never   Substance Use Topics    Alcohol use: Never     Comment: does not drink       Objective     Vital Signs:  Ht 177.8 cm (70\")   Wt 97.5 kg (215 lb)   BMI 30.85 kg/m²   Respiratory:  breathing not labored, respiratory effort appears normal  Cardiovascular:  heart regular rate  Skin and subcutaneous tissue:  At the right proximal arm, just distal to the right shoulder at the anterior lateral aspect, there is a circumscribed subcutaneous mass that is soft, mobile, and nontender.  Skin overlying the mass is normal-appearing.  Mass is about 6 cm in diameter.  Musculoskeletal: moving all extremities symmetrically and purposefully  Neurologic:  no obvious motor or sensory deficits, speech clear  Psychiatric:  judgment and insight intact, mood normal, affect appropriate, cooperative      Assessment:  Subcutaneous mass of right arm    Plan:  Excision of subcutaneous mass from right arm  Will cancel MRI ordered by referring provider as exam characteristics are consistent with a lipoma    Discussion: Indications, options, risks, benefits, and expected outcomes of planned surgery were discussed with the patient and she agrees to proceed.    Prosper Sosa, " MD  03/11/2024    Electronically signed by Prosper Sosa MD, 03/11/24, 5:08 PM EDT.

## 2024-03-11 NOTE — LETTER
March 11, 2024       No Recipients    Patient: Meliza Roberts   YOB: 1955   Date of Visit: 3/11/2024     Dear VALENTINE Cast:       Thank you for referring Meliza Roberts to me for evaluation.  Please see the assessment and plan section at the bottom of my note included below for feedback.  Take care.       Sincerely,        Prosper Sosa MD        CC:   No Recipients    Prosper Sosa MD  03/11/24 1708  Sign when Signing Visit  Chief Complaint:  NEW PATIENT    Primary Care Provider: Bishnu Figueroa APRN    Referring Provider: Bishnu Figueroa APRN    History of Present Illness  Meliza Roberts is a 68 y.o. female referred by VALENTINE Cast for a soft tissue mass.  Send has a mass at her right proximal arm.  The mass is slowly increased in size.  Is been present for over a few years.  The mass is tender when pressure is placed over it.  Soft tissue ultrasound was done on 2/28/2024.  It showed a soft tissue mass at the proximal right arm just distal to the shoulder.  It was measured at 5.5 cm in size on the ultrasound.  MRI was ordered in case the physical exam was not concordant with a lipoma.    Allergies: Drug ingredient [cephalexin], Clindamycin/lincomycin, and Sulfa antibiotics    Outpatient Medications Marked as Taking for the 3/11/24 encounter (Office Visit) with Prosper Sosa MD   Medication Sig Dispense Refill   • acetaminophen (TYLENOL) 650 MG 8 hr tablet Tylenol Arthritis Pain 650 mg oral tablet extended release take 2 tablets by oral route every 8 hours   Active     • calcium carbonate-cholecalciferol 500-400 MG-UNIT tablet tablet Take  by mouth Daily.     • diclofenac (VOLTAREN) 50 MG EC tablet Take 1 tablet by mouth 2 (Two) Times a Day. 180 tablet 1   • Diclofenac Sodium (VOLTAREN) 1 % gel gel Apply  topically to the appropriate area as directed 2 (Two) Times a Day. 450 g 1   • ferrous sulfate 324 (65 Fe) MG tablet delayed-release EC tablet Take 1  "tablet by mouth Daily With Breakfast.     • vitamin B-12 (CYANOCOBALAMIN) 500 MCG tablet Take 1 tablet by mouth Daily.     • vitamin D3 125 MCG (5000 UT) capsule capsule Take 1 capsule by mouth Daily.         Past Medical History:   • Aftercare following right knee joint replacement surgery   • Allergic conjunctivitis and rhinitis   • Anemia   • Arthritis   • Hepatitis   • History of total knee arthroplasty, right   • Limb swelling   • Lipoma of arm   • Seasonal allergies   • Sinus trouble        Past Surgical History:   • BREAST MASS EXCISION    w needle localization   • COLONOSCOPY   • HYSTERECTOMY   • KNEE ACL RECONSTRUCTION    rt knee- 89'/10' left knee-91'   • OTHER SURGICAL HISTORY    artificial joints/limbs   • ROTATOR CUFF REPAIR   • TUBAL ABDOMINAL LIGATION       Family History:   Family History   Problem Relation Age of Onset   • Heart disease Mother    • Cancer Mother    • Arthritis Mother    • Stroke Father    • Heart disease Father    • Diabetes Father    • Diabetes Sister    • Cancer Sister    • Diabetes Brother    • Multiple myeloma Brother 23   • Arthritis Brother    • Heart disease Maternal Grandmother         Social History:  Social History     Tobacco Use   • Smoking status: Never     Passive exposure: Never   • Smokeless tobacco: Never   Substance Use Topics   • Alcohol use: Never     Comment: does not drink       Objective    Vital Signs:  Ht 177.8 cm (70\")   Wt 97.5 kg (215 lb)   BMI 30.85 kg/m²   Respiratory:  breathing not labored, respiratory effort appears normal  Cardiovascular:  heart regular rate  Skin and subcutaneous tissue:  At the right proximal arm, just distal to the right shoulder at the anterior lateral aspect, there is a circumscribed subcutaneous mass that is soft, mobile, and nontender.  Skin overlying the mass is normal-appearing.  Mass is about 6 cm in diameter.  Musculoskeletal: moving all extremities symmetrically and purposefully  Neurologic:  no obvious motor or " sensory deficits, speech clear  Psychiatric:  judgment and insight intact, mood normal, affect appropriate, cooperative      Assessment:  Subcutaneous mass of right arm    Plan:  Excision of subcutaneous mass from right arm  Will cancel MRI ordered by referring provider as exam characteristics are consistent with a lipoma    Discussion: Indications, options, risks, benefits, and expected outcomes of planned surgery were discussed with the patient and she agrees to proceed.    Prosper Sosa MD  03/11/2024    Electronically signed by Prosper Sosa MD, 03/11/24, 5:08 PM EDT.

## 2024-03-13 ENCOUNTER — TELEPHONE (OUTPATIENT)
Dept: SURGERY | Facility: CLINIC | Age: 69
End: 2024-03-13
Payer: MEDICARE

## 2024-03-13 RX ORDER — MULTIPLE VITAMINS W/ MINERALS TAB 9MG-400MCG
1 TAB ORAL DAILY
COMMUNITY

## 2024-03-13 NOTE — TELEPHONE ENCOUNTER
Caller: Meliza Trivedi     Relationship: SELF     Best call back number: [phone]     What is the best time to reach you: ANYTIME     Who are you requesting to speak with (clinical staff, provider,  specific staff member): DOESN'T KNOW     Do you know the name of the person who called: NO    What was the call regarding: DOESN'T KNOW     Is it okay if the provider responds through MyChart:  YES

## 2024-03-13 NOTE — TELEPHONE ENCOUNTER
I called pt to see if she wanted to have surgery sooner. The date would be for tomorrow, 3-14. She wants me to call her back at 1:45 to check back with her.

## 2024-03-14 ENCOUNTER — HOSPITAL ENCOUNTER (OUTPATIENT)
Facility: HOSPITAL | Age: 69
Setting detail: HOSPITAL OUTPATIENT SURGERY
Discharge: HOME | End: 2024-03-14
Attending: SURGERY | Admitting: SURGERY
Payer: MEDICARE

## 2024-03-14 ENCOUNTER — ANESTHESIA EVENT (OUTPATIENT)
Dept: PERIOP | Facility: HOSPITAL | Age: 69
End: 2024-03-14
Payer: MEDICARE

## 2024-03-14 ENCOUNTER — ANESTHESIA (OUTPATIENT)
Dept: PERIOP | Facility: HOSPITAL | Age: 69
End: 2024-03-14
Payer: MEDICARE

## 2024-03-14 VITALS
HEIGHT: 70 IN | WEIGHT: 212.52 LBS | TEMPERATURE: 96.9 F | OXYGEN SATURATION: 91 % | HEART RATE: 63 BPM | DIASTOLIC BLOOD PRESSURE: 80 MMHG | BODY MASS INDEX: 30.43 KG/M2 | RESPIRATION RATE: 16 BRPM | SYSTOLIC BLOOD PRESSURE: 138 MMHG

## 2024-03-14 DIAGNOSIS — R22.31 SUBCUTANEOUS MASS OF RIGHT UPPER EXTREMITY: ICD-10-CM

## 2024-03-14 PROCEDURE — 88304 TISSUE EXAM BY PATHOLOGIST: CPT | Performed by: SURGERY

## 2024-03-14 PROCEDURE — 25010000002 MIDAZOLAM PER 1MG: Performed by: ANESTHESIOLOGY

## 2024-03-14 PROCEDURE — 25810000003 LACTATED RINGERS PER 1000 ML: Performed by: ANESTHESIOLOGY

## 2024-03-14 PROCEDURE — 25010000002 ONDANSETRON PER 1 MG: Performed by: NURSE ANESTHETIST, CERTIFIED REGISTERED

## 2024-03-14 PROCEDURE — 24073 EX ARM/ELBOW TUM DEEP 5 CM/>: CPT | Performed by: SURGERY

## 2024-03-14 PROCEDURE — 25010000002 FENTANYL CITRATE (PF) 50 MCG/ML SOLUTION: Performed by: NURSE ANESTHETIST, CERTIFIED REGISTERED

## 2024-03-14 PROCEDURE — 25010000002 KETOROLAC TROMETHAMINE PER 15 MG: Performed by: NURSE ANESTHETIST, CERTIFIED REGISTERED

## 2024-03-14 PROCEDURE — 24073 EX ARM/ELBOW TUM DEEP 5 CM/>: CPT | Performed by: SPECIALIST/TECHNOLOGIST, OTHER

## 2024-03-14 PROCEDURE — 25010000002 BUPIVACAINE (PF) 0.25 % SOLUTION: Performed by: SURGERY

## 2024-03-14 PROCEDURE — 25010000002 PROPOFOL 10 MG/ML EMULSION: Performed by: NURSE ANESTHETIST, CERTIFIED REGISTERED

## 2024-03-14 PROCEDURE — 25010000002 DEXAMETHASONE PER 1 MG: Performed by: NURSE ANESTHETIST, CERTIFIED REGISTERED

## 2024-03-14 RX ORDER — HYDROCODONE BITARTRATE AND ACETAMINOPHEN 5; 325 MG/1; MG/1
1 TABLET ORAL EVERY 6 HOURS PRN
Qty: 6 TABLET | Refills: 0 | Status: SHIPPED | OUTPATIENT
Start: 2024-03-14

## 2024-03-14 RX ORDER — MIDAZOLAM HYDROCHLORIDE 2 MG/2ML
2 INJECTION, SOLUTION INTRAMUSCULAR; INTRAVENOUS ONCE
Status: COMPLETED | OUTPATIENT
Start: 2024-03-14 | End: 2024-03-14

## 2024-03-14 RX ORDER — ACETAMINOPHEN 500 MG
1000 TABLET ORAL ONCE
Status: COMPLETED | OUTPATIENT
Start: 2024-03-14 | End: 2024-03-14

## 2024-03-14 RX ORDER — PROPOFOL 10 MG/ML
VIAL (ML) INTRAVENOUS AS NEEDED
Status: DISCONTINUED | OUTPATIENT
Start: 2024-03-14 | End: 2024-03-14 | Stop reason: SURG

## 2024-03-14 RX ORDER — OXYCODONE HYDROCHLORIDE 5 MG/1
5 TABLET ORAL
Status: DISCONTINUED | OUTPATIENT
Start: 2024-03-14 | End: 2024-03-14 | Stop reason: HOSPADM

## 2024-03-14 RX ORDER — DEXAMETHASONE SODIUM PHOSPHATE 4 MG/ML
INJECTION, SOLUTION INTRA-ARTICULAR; INTRALESIONAL; INTRAMUSCULAR; INTRAVENOUS; SOFT TISSUE AS NEEDED
Status: DISCONTINUED | OUTPATIENT
Start: 2024-03-14 | End: 2024-03-14 | Stop reason: SURG

## 2024-03-14 RX ORDER — MAGNESIUM HYDROXIDE 1200 MG/15ML
LIQUID ORAL AS NEEDED
Status: DISCONTINUED | OUTPATIENT
Start: 2024-03-14 | End: 2024-03-14 | Stop reason: HOSPADM

## 2024-03-14 RX ORDER — MEPERIDINE HYDROCHLORIDE 25 MG/ML
12.5 INJECTION INTRAMUSCULAR; INTRAVENOUS; SUBCUTANEOUS
Status: DISCONTINUED | OUTPATIENT
Start: 2024-03-14 | End: 2024-03-14 | Stop reason: HOSPADM

## 2024-03-14 RX ORDER — ONDANSETRON 2 MG/ML
4 INJECTION INTRAMUSCULAR; INTRAVENOUS ONCE AS NEEDED
Status: DISCONTINUED | OUTPATIENT
Start: 2024-03-14 | End: 2024-03-14 | Stop reason: HOSPADM

## 2024-03-14 RX ORDER — LIDOCAINE HYDROCHLORIDE 20 MG/ML
INJECTION, SOLUTION EPIDURAL; INFILTRATION; INTRACAUDAL; PERINEURAL AS NEEDED
Status: DISCONTINUED | OUTPATIENT
Start: 2024-03-14 | End: 2024-03-14 | Stop reason: SURG

## 2024-03-14 RX ORDER — KETOROLAC TROMETHAMINE 15 MG/ML
INJECTION, SOLUTION INTRAMUSCULAR; INTRAVENOUS AS NEEDED
Status: DISCONTINUED | OUTPATIENT
Start: 2024-03-14 | End: 2024-03-14 | Stop reason: SURG

## 2024-03-14 RX ORDER — SODIUM CHLORIDE, SODIUM LACTATE, POTASSIUM CHLORIDE, CALCIUM CHLORIDE 600; 310; 30; 20 MG/100ML; MG/100ML; MG/100ML; MG/100ML
9 INJECTION, SOLUTION INTRAVENOUS CONTINUOUS PRN
Status: DISCONTINUED | OUTPATIENT
Start: 2024-03-14 | End: 2024-03-14 | Stop reason: HOSPADM

## 2024-03-14 RX ORDER — BUPIVACAINE HYDROCHLORIDE 2.5 MG/ML
INJECTION, SOLUTION EPIDURAL; INFILTRATION; INTRACAUDAL AS NEEDED
Status: DISCONTINUED | OUTPATIENT
Start: 2024-03-14 | End: 2024-03-14 | Stop reason: HOSPADM

## 2024-03-14 RX ORDER — ONDANSETRON 2 MG/ML
INJECTION INTRAMUSCULAR; INTRAVENOUS AS NEEDED
Status: DISCONTINUED | OUTPATIENT
Start: 2024-03-14 | End: 2024-03-14 | Stop reason: SURG

## 2024-03-14 RX ORDER — PROMETHAZINE HYDROCHLORIDE 12.5 MG/1
25 TABLET ORAL ONCE AS NEEDED
Status: DISCONTINUED | OUTPATIENT
Start: 2024-03-14 | End: 2024-03-14 | Stop reason: HOSPADM

## 2024-03-14 RX ORDER — PROMETHAZINE HYDROCHLORIDE 25 MG/1
25 SUPPOSITORY RECTAL ONCE AS NEEDED
Status: DISCONTINUED | OUTPATIENT
Start: 2024-03-14 | End: 2024-03-14 | Stop reason: HOSPADM

## 2024-03-14 RX ORDER — FENTANYL CITRATE 50 UG/ML
INJECTION, SOLUTION INTRAMUSCULAR; INTRAVENOUS AS NEEDED
Status: DISCONTINUED | OUTPATIENT
Start: 2024-03-14 | End: 2024-03-14 | Stop reason: SURG

## 2024-03-14 RX ADMIN — SODIUM CHLORIDE, POTASSIUM CHLORIDE, SODIUM LACTATE AND CALCIUM CHLORIDE 9 ML/HR: 600; 310; 30; 20 INJECTION, SOLUTION INTRAVENOUS at 10:20

## 2024-03-14 RX ADMIN — LIDOCAINE HYDROCHLORIDE 100 MG: 20 INJECTION, SOLUTION INTRAVENOUS at 11:07

## 2024-03-14 RX ADMIN — FENTANYL CITRATE 25 MCG: 50 INJECTION, SOLUTION INTRAMUSCULAR; INTRAVENOUS at 11:59

## 2024-03-14 RX ADMIN — KETOROLAC TROMETHAMINE 30 MG: 15 INJECTION, SOLUTION INTRAMUSCULAR; INTRAVENOUS at 11:39

## 2024-03-14 RX ADMIN — FENTANYL CITRATE 25 MCG: 50 INJECTION, SOLUTION INTRAMUSCULAR; INTRAVENOUS at 11:07

## 2024-03-14 RX ADMIN — DEXAMETHASONE SODIUM PHOSPHATE 4 MG: 4 INJECTION, SOLUTION INTRAMUSCULAR; INTRAVENOUS at 11:12

## 2024-03-14 RX ADMIN — MIDAZOLAM HYDROCHLORIDE 2 MG: 1 INJECTION, SOLUTION INTRAMUSCULAR; INTRAVENOUS at 10:52

## 2024-03-14 RX ADMIN — FENTANYL CITRATE 25 MCG: 50 INJECTION, SOLUTION INTRAMUSCULAR; INTRAVENOUS at 11:29

## 2024-03-14 RX ADMIN — PROPOFOL 200 MG: 10 INJECTION, EMULSION INTRAVENOUS at 11:07

## 2024-03-14 RX ADMIN — ONDANSETRON HYDROCHLORIDE 4 MG: 2 SOLUTION INTRAMUSCULAR; INTRAVENOUS at 11:39

## 2024-03-14 RX ADMIN — ACETAMINOPHEN 1000 MG: 500 TABLET ORAL at 10:19

## 2024-03-14 RX ADMIN — FENTANYL CITRATE 25 MCG: 50 INJECTION, SOLUTION INTRAMUSCULAR; INTRAVENOUS at 12:05

## 2024-03-14 NOTE — ANESTHESIA POSTPROCEDURE EVALUATION
Patient: Meliza Roberts    Procedure Summary       Date: 03/14/24 Room / Location: [facility] OR  / [facility]    Anesthesia Start: 1103 Anesthesia Stop: 1206    Procedure: Excision subcutaneous arm mass (Right: Arm Upper) Diagnosis:       Subcutaneous mass of right upper extremity      (Subcutaneous mass of right upper extremity [R22.31])    Surgeons: Prosper Sosa MD Provider: Jan Hamilton MD    Anesthesia Type: general, MAC ASA Status: 1            Anesthesia Type: general, MAC    Vitals  Vitals Value Taken Time   /70 03/14/24 1210   Temp     Pulse 62 03/14/24 1213   Resp     SpO2 97 % 03/14/24 1213   Vitals shown include unfiled device data.        Post Anesthesia Care and Evaluation    Patient location during evaluation: bedside  Patient participation: complete - patient participated  Level of consciousness: awake  Pain management: adequate    Airway patency: patent  Anesthetic complications: No anesthetic complications  PONV Status: none  Cardiovascular status: acceptable and stable  Respiratory status: acceptable  Hydration status: acceptable    Comments: An Anesthesiologist personally participated in the most demanding procedures (including induction and emergence if applicable) in the anesthesia plan, monitored the course of anesthesia administration at frequent intervals and remained physically present and available for immediate diagnosis and treatment of emergencies.

## 2024-03-14 NOTE — ANESTHESIA PREPROCEDURE EVALUATION
Anesthesia Evaluation     no history of anesthetic complications:   NPO Solid Status: > 8 hours  NPO Liquid Status: > 6 hours           Airway   Mallampati: I  TM distance: >3 FB  Neck ROM: full  No difficulty expected  Dental      Pulmonary - negative pulmonary ROS and normal exam    breath sounds clear to auscultation  Cardiovascular - negative cardio ROS and normal exam  Exercise tolerance: good (4-7 METS)    Rhythm: regular  Rate: normal        Neuro/Psych- negative ROS  GI/Hepatic/Renal/Endo  Hepatitis: hx drug induced hepatitis.    Musculoskeletal     Abdominal    Substance History      OB/GYN          Other   arthritis,                       Anesthesia Plan    ASA 1     general and MAC     (GA v MAC    Patient understands anesthesia not responsible for dental damage.)  intravenous induction     Anesthetic plan, risks, benefits, and alternatives have been provided, discussed and informed consent has been obtained with: patient.    Plan discussed with CRNA.        CODE STATUS:

## 2024-03-14 NOTE — DISCHARGE INSTRUCTIONS
DISCHARGE INSTRUCTIONS  SURGICAL / AMBULATORY  PROCEDURES      For your surgery you had:  General anesthesia (you may have a sore throat for the first 24 hours)  IV sedation.  Local anesthesia  Monitored anesthesia Care  You received a medicated patch for nausea prevention today (behind your ear). It is recommended that you remove it 24-48 hours post-operatively. It must be removed within 72 hours.   You have received an anesthesia medication today that can cause hormonal forms of birth control to be ineffective. You should use a different form of birth control (to prevent pregnancy) for 7 days.   You may experience dizziness, drowsiness, or light-headedness for several hours following surgery/procedure.  Do not stay alone today or tonight.  Limit your activity for 24 hours.  Resume your diet slowly.  Follow whatever special dietary instructions you may have been given by your doctor.  You should not drive or operate machinery, drink alcohol, or sign legally binding documents for 24 hours or while you are taking pain medication.  Last dose of pain medication was given at:   .  NOTIFY YOUR DOCTOR IF YOU EXPERIENCE ANY OF THE FOLLOWING:  Temperature greater than 101 degrees Fahrenheit  Shaking Chills  Redness or excessive drainage from incision  Nausea, vomiting and/or pain that is not controlled by prescribed medications  Increase in bleeding or bleeding that is excessive  Unable to urinate in 6 hours after surgery  If unable to reach your doctor, please go to the closest Emergency Room  You may begin dressing changes:     [] in 24 hours   [] in 48 hours   [] Other:    You may remove Band-Aid/dressing tomorrow.  You may shower or bathe   .  Apply an ice pack 24-48 hours.  Medications per physician instructions as indicated on Discharge Medication Information Sheet.  You should see   for follow-up care   on   .  Phone number:       SPECIAL INSTRUCTIONS:                                   Dr. Sosa's Instructions           DIET  Resume your regular diet.     ACTIVITY  Do not lift anything greater than 15 pounds with your right arm for two weeks.  After two weeks, you have no activity restrictions and may gradually increase your activities using common sense.     WOUND CARE & SHOWERING/BATHING  Your incision is covered with a plastic type material that will flake off on its own in the next few weeks.  If the plastic type material has not flaked off on its own by 2 weeks after your surgery then use tweezers to pull it off.  You have sutures in your incision but they are placed below the level of the skin and they will slowly dissolve (they do not need to be removed).  The skin around your incision will likely have some bruising.  This is normal.  You can shower beginning tomorrow but wait two weeks after your surgery before taking any tub baths.      HOME MEDICATIONS  Resume all of your normal home medications.     PAIN CONTROL  You will receive a narcotic pain medicine prescription before you are discharged home.  Be sure to take the narcotic pain medication with some food so as not to upset your stomach.  Do not drive while you are taking the prescription pain medication.  Take Tylenol or Motrin for mild pain.     BOWEL MOVEMENTS  It is not unusual for narcotic pain medications to cause constipation.  Also, the medications and anesthesia you received for your surgery can have a constipating effect.  To help avoid constipation, drink at least four eight-ounce glasses of water each day and use over-the-counter laxatives/stool softeners (dulcolax, milk of magnesia, senokot, etc.).  I recommend drinking the aforementioned amount of water daily and taking 30 ml of milk of magnesia two times each day while you are using the prescribed narcotic pain medication.  If your bowel movements become too loose or too frequent, then simply stop following these recommendations unless you start to feel constipated.     FOLLOW-UP VISIT &  QUESTIONS/CONCERNS  Call Dr. Sosa´s office at [phone] and schedule a follow-up appointment for about three to four weeks after your surgery date.  Should you have any questions or concerns, please call Dr. Rodriguezs office.

## 2024-03-14 NOTE — OP NOTE
Operative Report    Patient Name:  Meliza Roberts  YOB: 1955    Date of Surgery:  3/14/2024     Pre-op Diagnosis:   Subcutaneous mass of right upper extremity [R22.31]       Post-op Diagnosis:   Intra-muscular lipoma of right arm    Procedure):  Excision of intra-muscular mass from right arm mass    Staff:  Surgeon(s):  Prosper Sosa MD    Assistant: Brenden Fritz CSA    Anesthesia: General    Estimated Blood Loss: 5 mL    Complications:  None    Drains:  None    Packing:  None    Implants:    Nothing was implanted during the procedure    Specimen:          Specimens       ID Source Type Tests Collected By Collected At Frozen?    A Arm, Right Tissue TISSUE PATHOLOGY EXAM   Prosper Sosa MD 3/14/24 1129 No    Description: right upper arm mass           Indications:  See my preop H&P note.     Findings: Approximately 6 cm diameter subcutaneous mass removed from the right arm.  Approximately half of the mass was located intramuscular.  Gross characteristics are consistent with a benign lipoma.    Description of Procedure: Patient was taken the operating placed supine on procedure table.  Timeout was performed.  Anesthesia was administered.  The right arm was prepped and draped in the usual fashion.  The targeted mass was palpated.  Local anesthesia was injected into the skin and subcutaneous tissue over the mass.  A 15 blade scalpel was used to make an incision through the skin.  The incision was deepened in the subcutaneous tissue.  A subcutaneous mass was identified and circumferentially dissected.  The deep aspect of the mass went through the fascia and was intramuscular.  About half of the mass was located in an intramuscular position.  The mass was completely circumferentially dissected and removed.  It was about 6 cm in diameter and gross characteristics are consistent with a benign lipoma.  Adequate hemostasis.  Wound was closed in layers with buried absorbable suture.  Skin  adhesive was placed.  No complications.  Patient was transported to the recovery area in stable condition.    Assistant: Brenden Fritz CSA  was responsible for performing the following activities: Retraction, Suction, Closing, and Placing Dressing and his skilled assistance was necessary for the success of this case.    Prosper Sosa MD     Date: 3/14/2024  Time: 11:58 EDT    Electronically signed by Prosper Sosa MD, 03/14/24, 11:58 AM EDT.

## 2024-03-18 ENCOUNTER — TELEPHONE (OUTPATIENT)
Dept: SURGERY | Facility: CLINIC | Age: 69
End: 2024-03-18
Payer: MEDICARE

## 2024-03-18 LAB
CYTO UR: NORMAL
LAB AP CASE REPORT: NORMAL
LAB AP CLINICAL INFORMATION: NORMAL
PATH REPORT.FINAL DX SPEC: NORMAL
PATH REPORT.GROSS SPEC: NORMAL

## 2024-03-18 NOTE — TELEPHONE ENCOUNTER
Patient needs a return to work statement for Wednesday 3/20/24. She stated she will come in tomorrow to pick it up.

## 2024-03-21 ENCOUNTER — TELEPHONE (OUTPATIENT)
Dept: SURGERY | Facility: CLINIC | Age: 69
End: 2024-03-21
Payer: MEDICARE

## 2024-03-21 NOTE — TELEPHONE ENCOUNTER
PT HAD EXCISION OF SUBQ MASS OF ARM ON 3/14. SHE HAS SOME CONCERNS THAT THERE IS A LUMP AT THE TOP OF THE INCISION AND THE AREA IS PINKISH. SHE'S AFRAID OF INFECTION. DENIES FEVER OR DRAINAGE. SHE IS GOING TO TRY AND UPLOAD A PICTURE FOR YOU TO SEE.

## 2024-04-01 ENCOUNTER — TELEPHONE (OUTPATIENT)
Dept: FAMILY MEDICINE CLINIC | Facility: CLINIC | Age: 69
End: 2024-04-01
Payer: MEDICARE

## 2024-04-01 NOTE — TELEPHONE ENCOUNTER
Caller: Meliza Trivedi    Relationship: Self    Best call back number: [phone]    What was the call regarding: PATIENT IS RETURNING CALL TO OFFICE. PATIENT IS UNSURE WHO CALLED OR WHY THE CALL WAS MADE.

## 2024-04-01 NOTE — TELEPHONE ENCOUNTER
Caller: Meliza Trivedi    Relationship: Self    Best call back number: [phone]    What was the call regarding: PATIENT IS RETURNING CALL.

## 2024-04-08 ENCOUNTER — OFFICE VISIT (OUTPATIENT)
Dept: SURGERY | Facility: CLINIC | Age: 69
End: 2024-04-08
Payer: MEDICARE

## 2024-04-08 VITALS
BODY MASS INDEX: 31.07 KG/M2 | SYSTOLIC BLOOD PRESSURE: 131 MMHG | HEIGHT: 70 IN | WEIGHT: 217 LBS | DIASTOLIC BLOOD PRESSURE: 87 MMHG | TEMPERATURE: 97.8 F

## 2024-04-08 DIAGNOSIS — Z09 ENCOUNTER FOR FOLLOW-UP: Primary | ICD-10-CM

## 2024-04-08 PROCEDURE — 99024 POSTOP FOLLOW-UP VISIT: CPT | Performed by: SURGERY

## 2024-04-08 NOTE — PROGRESS NOTES
Patient is here for follow-up after excision of a lipoma from her right arm and 3/14/2024.  Following is a copy of the findings from my op note:  Approximately 6 cm diameter subcutaneous mass removed from the right arm.  Approximately half of the mass was located intramuscular.  Gross characteristics are consistent with a benign lipoma.  Pathology result was consistent with lipoma.    Patient is doing very well.  No complaints or concerns.  Incision looks fine.  Pathology discussed.    No new issues to address.  Patient seems pleased with her outcome thus far and can see me PRN.

## 2024-05-15 ENCOUNTER — TELEPHONE (OUTPATIENT)
Dept: FAMILY MEDICINE CLINIC | Facility: CLINIC | Age: 69
End: 2024-05-15
Payer: MEDICARE

## 2024-05-15 ENCOUNTER — OFFICE VISIT (OUTPATIENT)
Dept: FAMILY MEDICINE CLINIC | Facility: CLINIC | Age: 69
End: 2024-05-15
Payer: MEDICARE

## 2024-05-15 VITALS
HEART RATE: 97 BPM | SYSTOLIC BLOOD PRESSURE: 126 MMHG | HEIGHT: 70 IN | BODY MASS INDEX: 31.98 KG/M2 | WEIGHT: 223.4 LBS | TEMPERATURE: 95 F | DIASTOLIC BLOOD PRESSURE: 80 MMHG | OXYGEN SATURATION: 97 %

## 2024-05-15 DIAGNOSIS — S80.869A INSECT BITE OF LOWER LEG, UNSPECIFIED LATERALITY, INITIAL ENCOUNTER: Primary | ICD-10-CM
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
## 2024-05-15 PROCEDURE — 1126F AMNT PAIN NOTED NONE PRSNT: CPT | Performed by: NURSE PRACTITIONER

## 2024-05-15 PROCEDURE — 99213 OFFICE O/P EST LOW 20 MIN: CPT | Performed by: NURSE PRACTITIONER

## 2024-05-15 PROCEDURE — 1159F MED LIST DOCD IN RCRD: CPT | Performed by: NURSE PRACTITIONER

## 2024-05-15 PROCEDURE — 1160F RVW MEDS BY RX/DR IN RCRD: CPT | Performed by: NURSE PRACTITIONER

## 2024-05-15 RX ORDER — TRIAMCINOLONE ACETONIDE 1 MG/G
1 OINTMENT TOPICAL 2 TIMES DAILY
Qty: 30 G | Refills: 0 | Status: SHIPPED | OUTPATIENT
Start: 2024-05-15

## 2024-05-15 NOTE — PROGRESS NOTES
"Chief Complaint  Rash    Subjective         Meliza Roberts presents to [facility]  Patient presents to the office today with concerns of multiple bug bites on her legs.  She states that they have been present for approximately 4 days.  She denies any other people in her home with these [place] areas.  She denies being out in any brush, woods or weeds.  Denies any drainage from the areas.  She denies any pain.  She states that they have been itching.    Rash         Objective     Vitals:    05/15/24 1049   BP: 126/80   BP Location: Right arm   Patient Position: Sitting   Cuff Size: Adult   Pulse: 97   Temp: 95 °F (35 °C)   TempSrc: Temporal   SpO2: 97%   Weight: 101 kg (223 lb 6.4 oz)   Height: 177.8 cm (70\")      Body mass index is 32.05 kg/m².             Physical Exam  Vitals reviewed.   Constitutional:       Appearance: Normal appearance.   Cardiovascular:      Rate and Rhythm: Normal rate and regular rhythm.      Pulses: Normal pulses.      Heart sounds: Normal heart sounds, S1 normal and S2 normal. No murmur heard.  Pulmonary:      Effort: Pulmonary effort is normal. No respiratory distress.      Breath sounds: Normal breath sounds.   Musculoskeletal:      Comments: Multiple insect bites noted to the lower legs bilaterally.  No erythema or drainage noted   Skin:     General: Skin is warm and dry.   Neurological:      Mental Status: She is alert and oriented to person, place, and time.   Psychiatric:         Attention and Perception: Attention normal.         Mood and Affect: Mood normal.         Behavior: Behavior normal.          Result Review :   The following data was reviewed by: VALENTINE Campoverde on 05/15/2024:      Procedures    Assessment and Plan   Diagnoses and all orders for this visit:    1. Insect bite of lower leg, unspecified laterality, initial encounter (Primary)  -     triamcinolone (KENALOG) 0.1 % ointment; Apply 1 Application topically to the " appropriate area as directed 2 (Two) Times a Day.  Dispense: 30 g; Refill: 0          Follow Up   Return if symptoms worsen or fail to improve.  Patient was given instructions and counseling regarding her condition or for health maintenance advice. Please see specific information pulled into the AVS if appropriate.

## 2024-05-15 NOTE — TELEPHONE ENCOUNTER
Caller: Meliza Trivedi    Relationship: Self    Best call back number: [phone]     What form or medical record are you requesting: RETURN TO WORK LETTER     Who is requesting this form or medical record from you:PATIENT     How would you like to receive the form or medical records (pick-up, mail, fax):            Timeframe paperwork needed: ASAP     Additional notes: PATIENT IS REQUESTING A LETTER TO STATE SHE CAN RETURN BACK TO WORK TOMORROW.    PLEASE CALL PATIENT WHEN READY FOR                
Is this ok?  
Note completed, patient aware       
FAMILY HISTORY:  Family history of cardiomyopathy    Mother  Still living? Unknown  Family history of Kristi-Parkinson-White (WPW) syndrome, Age at diagnosis: Age Unknown    Grandparent  Still living? Unknown  Family history of Kristi-Parkinson-White (WPW) syndrome, Age at diagnosis: Age Unknown

## 2024-06-05 ENCOUNTER — OFFICE VISIT (OUTPATIENT)
Dept: FAMILY MEDICINE CLINIC | Facility: CLINIC | Age: 69
End: 2024-06-05
Payer: MEDICARE

## 2024-06-05 VITALS
BODY MASS INDEX: 32.01 KG/M2 | RESPIRATION RATE: 18 BRPM | HEART RATE: 84 BPM | WEIGHT: 223.6 LBS | OXYGEN SATURATION: 99 % | HEIGHT: 70 IN | DIASTOLIC BLOOD PRESSURE: 88 MMHG | SYSTOLIC BLOOD PRESSURE: 132 MMHG | TEMPERATURE: 97 F

## 2024-06-05 DIAGNOSIS — M19.90 ARTHRITIS: ICD-10-CM

## 2024-06-05 DIAGNOSIS — S80.869A INSECT BITE OF LOWER LEG, UNSPECIFIED LATERALITY, INITIAL ENCOUNTER: Primary | ICD-10-CM
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
## 2024-06-05 PROCEDURE — 99213 OFFICE O/P EST LOW 20 MIN: CPT | Performed by: NURSE PRACTITIONER

## 2024-06-05 PROCEDURE — 1160F RVW MEDS BY RX/DR IN RCRD: CPT | Performed by: NURSE PRACTITIONER

## 2024-06-05 PROCEDURE — 1159F MED LIST DOCD IN RCRD: CPT | Performed by: NURSE PRACTITIONER

## 2024-06-05 PROCEDURE — 1126F AMNT PAIN NOTED NONE PRSNT: CPT | Performed by: NURSE PRACTITIONER

## 2024-06-05 RX ORDER — TRIAMCINOLONE ACETONIDE 1 MG/G
1 OINTMENT TOPICAL 2 TIMES DAILY
Qty: 80 G | Refills: 0 | Status: SHIPPED | OUTPATIENT
Start: 2024-06-05

## 2024-06-05 RX ORDER — METHYLPREDNISOLONE 4 MG/1
TABLET ORAL
Qty: 21 TABLET | Refills: 0 | Status: SHIPPED | OUTPATIENT
Start: 2024-06-05

## 2024-06-05 NOTE — PROGRESS NOTES
Answers submitted by the patient for this visit:  Primary Reason for Visit (Submitted on 6/3/2024)  What is the primary reason for your visit?: Rash  Rash Questionnaire (Submitted on 6/3/2024)  Chief Complaint: Rash  Chronicity: recurrent  Onset: 1 to 4 weeks ago  Progression since onset: coming and going  Affected locations: left leg, left ankle, right arm, right upper leg, right leg, right ankle  Characteristics: itchiness  Exposed to: unknown  anorexia: No  congestion: No  cough: No  diarrhea: No  facial edema: No  fatigue: No  fever: No  joint pain: No  nail changes: No  rhinorrhea: No  shortness of breath: No  sore throat: No  vomiting: No  Chief Complaint  Insect Bite    Subjective        Meliza Roberts presents to [facility]  History of Present Illness  Insect bites that she has for the last few weeks.  Cream is working but concerned is going up legs.  Insect Bite  Associated symptoms include a rash. Pertinent negatives include no anorexia, congestion, coughing, fatigue, fever, sore throat or vomiting.   Rash  This is a recurrent problem. The current episode started 1 to 4 weeks ago. The problem has been coming and going since onset. The affected locations include the left leg, left ankle, right arm, right upper leg, right leg and right ankle. The rash is characterized by itchiness. It is unknown if there was an exposure to a precipitant. Pertinent negatives include no anorexia, congestion, cough, diarrhea, facial edema, fatigue, fever, joint pain, nail changes, rhinorrhea, shortness of breath, sore throat or vomiting.       The following portions of the patient's history were personally reviewed and updated as appropriate: allergies, current medications, past medical history, past surgical history, past family history, and past social history.     Body mass index is 32.08 kg/m².           Past History:    Medical History: has a past medical history of Aftercare  following right knee joint replacement surgery (01/29/2018), Allergic conjunctivitis and rhinitis, Anemia, Arthritis, H/O Hepatitis, History of total knee arthroplasty, right (05/16/2018), Limb swelling, Lipoma of arm (06/15/2020), Seasonal allergies, and Sinus trouble.     Surgical History: has a past surgical history that includes Colonoscopy (06/2017); Breast mass excision (Right, 06/06/1997); Hysterectomy (2006); Anterior cruciate ligament repair (Bilateral, 1989/1991/2010); Tubal ligation (09/14/1993); Rotator cuff repair (Left, 01/27/2021); Replacement total knee (Right); Cyst Removal (Right, 03/14/2024); Joint replacement (2018); and Breast surgery (1996).     Family History: family history includes Arthritis in her brother and mother; Cancer in her brother, mother, sister, and sister; Diabetes in her brother, father, and sister; Heart disease in her father, maternal grandmother, and mother; Hyperlipidemia in her sister; Multiple myeloma (age of onset: 23) in her brother; Stroke in her father; Vision loss in her father and sister.     Social History: reports that she has never smoked. She has never been exposed to tobacco smoke. She has never used smokeless tobacco. She reports that she does not drink alcohol and does not use drugs.    Allergies: Drug ingredient [cephalexin], Clindamycin/lincomycin, and Sulfa antibiotics          Current Outpatient Medications:     acetaminophen (TYLENOL) 650 MG 8 hr tablet, Tylenol Arthritis Pain 650 mg oral tablet extended release take 2 tablets by oral route every 8 hours   Active, Disp: , Rfl:     calcium carbonate-cholecalciferol 500-400 MG-UNIT tablet tablet, Take  by mouth Daily., Disp: , Rfl:     diclofenac (VOLTAREN) 50 MG EC tablet, Take 1 tablet by mouth 2 (Two) Times a Day., Disp: 180 tablet, Rfl: 1    Diclofenac Sodium (VOLTAREN) 1 % gel gel, Apply  topically to the appropriate area as directed 2 (Two) Times a Day., Disp: 450 g, Rfl: 1    ferrous sulfate 324 (65  "Fe) MG tablet delayed-release EC tablet, Take 1 tablet by mouth Daily With Breakfast., Disp: , Rfl:     multivitamin with minerals tablet tablet, Take 1 tablet by mouth Daily., Disp: , Rfl:     triamcinolone (KENALOG) 0.1 % ointment, Apply 1 Application topically to the appropriate area as directed 2 (Two) Times a Day., Disp: 80 g, Rfl: 0    vitamin B-12 (CYANOCOBALAMIN) 500 MCG tablet, Take 1 tablet by mouth Daily., Disp: , Rfl:     vitamin D3 125 MCG (5000 UT) capsule capsule, Take 1 capsule by mouth Daily., Disp: , Rfl:     methylPREDNISolone (MEDROL) 4 MG dose pack, Take as directed on package instructions., Disp: 21 tablet, Rfl: 0    Medications Discontinued During This Encounter   Medication Reason    HYDROcodone-acetaminophen (Norco) 5-325 MG per tablet *Therapy completed    triamcinolone (KENALOG) 0.1 % ointment Reorder         Review of Systems   Constitutional:  Negative for fatigue and fever.   HENT:  Negative for congestion, rhinorrhea and sore throat.    Respiratory:  Negative for cough and shortness of breath.    Gastrointestinal:  Negative for anorexia, diarrhea and vomiting.   Musculoskeletal:  Negative for joint pain.   Skin:  Positive for rash. Negative for nail changes.        Objective         Vitals:    06/05/24 1415   BP: 132/88   BP Location: Right arm   Patient Position: Sitting   Cuff Size: Adult   Pulse: 84   Resp: 18   Temp: 97 °F (36.1 °C)   TempSrc: Temporal   SpO2: 99%   Weight: 101 kg (223 lb 9.6 oz)   Height: 177.8 cm (70\")     Body mass index is 32.08 kg/m².         Physical Exam  Vitals reviewed.   Constitutional:       Appearance: Normal appearance. She is well-developed.   HENT:      Head: Normocephalic and atraumatic.      Mouth/Throat:      Pharynx: No oropharyngeal exudate.   Eyes:      Conjunctiva/sclera: Conjunctivae normal.      Pupils: Pupils are equal, round, and reactive to light.   Cardiovascular:      Rate and Rhythm: Normal rate and regular rhythm.      Heart sounds: " Normal heart sounds. No murmur heard.     No friction rub. No gallop.   Pulmonary:      Effort: Pulmonary effort is normal.      Breath sounds: Normal breath sounds. No wheezing or rhonchi.   Skin:     General: Skin is warm and dry.      Findings: Rash is macular and urticarial.   Neurological:      Mental Status: She is alert and oriented to person, place, and time.   Psychiatric:         Mood and Affect: Mood and affect normal.         Behavior: Behavior normal.         Thought Content: Thought content normal.         Judgment: Judgment normal.             Result Review :               Assessment and Plan     Diagnoses and all orders for this visit:    1. Insect bite of lower leg, unspecified laterality, initial encounter (Primary)  -     methylPREDNISolone (MEDROL) 4 MG dose pack; Take as directed on package instructions.  Dispense: 21 tablet; Refill: 0  -     triamcinolone (KENALOG) 0.1 % ointment; Apply 1 Application topically to the appropriate area as directed 2 (Two) Times a Day.  Dispense: 80 g; Refill: 0              Follow Up     Return for Next scheduled follow up.    Patient was given instructions and counseling regarding her condition or for health maintenance advice. Please see specific information pulled into the AVS if appropriate.

## 2024-06-06 NOTE — TELEPHONE ENCOUNTER
UPCOMING APPTS  With Family Medicine (VALENTINE Cast)  08/29/2025 at 7:00 AM  LAST OFFICE VISIT - THIS DEPT  6/5/2024 Bishnu Figueroa APRN

## 2024-06-07 ENCOUNTER — LAB (OUTPATIENT)
Dept: LAB | Facility: HOSPITAL | Age: 69
End: 2024-06-07
Payer: MEDICARE

## 2024-06-07 DIAGNOSIS — E55.9 VITAMIN D DEFICIENCY: ICD-10-CM

## 2024-06-07 DIAGNOSIS — D50.9 IRON DEFICIENCY ANEMIA, UNSPECIFIED IRON DEFICIENCY ANEMIA TYPE: ICD-10-CM

## 2024-06-07 DIAGNOSIS — E78.00 ELEVATED LDL CHOLESTEROL LEVEL: ICD-10-CM

## 2024-06-07 DIAGNOSIS — E53.8 B12 DEFICIENCY: ICD-10-CM

## 2024-06-07 LAB
25(OH)D3 SERPL-MCNC: 85.1 NG/ML (ref 30–100)
ALBUMIN SERPL-MCNC: 4.2 G/DL (ref 3.5–5.2)
ALBUMIN/GLOB SERPL: 1.3 G/DL
ALP SERPL-CCNC: 81 U/L (ref 39–117)
ALT SERPL W P-5'-P-CCNC: 18 U/L (ref 1–33)
ANION GAP SERPL CALCULATED.3IONS-SCNC: 9 MMOL/L (ref 5–15)
AST SERPL-CCNC: 10 U/L (ref 1–32)
BACTERIA UR QL AUTO: ABNORMAL /HPF
BILIRUB SERPL-MCNC: 0.2 MG/DL (ref 0–1.2)
BILIRUB UR QL STRIP: NEGATIVE
BUN SERPL-MCNC: 15 MG/DL (ref 8–23)
BUN/CREAT SERPL: 21.7 (ref 7–25)
CALCIUM SPEC-SCNC: 9.5 MG/DL (ref 8.6–10.5)
CHLORIDE SERPL-SCNC: 105 MMOL/L (ref 98–107)
CHOLEST SERPL-MCNC: 176 MG/DL (ref 0–200)
CLARITY UR: CLEAR
CO2 SERPL-SCNC: 27 MMOL/L (ref 22–29)
COLOR UR: YELLOW
CREAT SERPL-MCNC: 0.69 MG/DL (ref 0.57–1)
EGFRCR SERPLBLD CKD-EPI 2021: 95.9 ML/MIN/1.73
FERRITIN SERPL-MCNC: 169 NG/ML (ref 13–150)
GLOBULIN UR ELPH-MCNC: 3.3 GM/DL
GLUCOSE SERPL-MCNC: 93 MG/DL (ref 65–99)
GLUCOSE UR STRIP-MCNC: NEGATIVE MG/DL
HDLC SERPL QL: 2.98
HDLC SERPL-MCNC: 59 MG/DL (ref 40–60)
HGB UR QL STRIP.AUTO: NEGATIVE
HOLD SPECIMEN: NORMAL
HYALINE CASTS UR QL AUTO: ABNORMAL /LPF
KETONES UR QL STRIP: NEGATIVE
LDLC SERPL CALC-MCNC: 105 MG/DL (ref 0–100)
LEUKOCYTE ESTERASE UR QL STRIP.AUTO: ABNORMAL
NITRITE UR QL STRIP: NEGATIVE
PH UR STRIP.AUTO: 6.5 [PH] (ref 5–8)
POTASSIUM SERPL-SCNC: 4.1 MMOL/L (ref 3.5–5.2)
PROT SERPL-MCNC: 7.5 G/DL (ref 6–8.5)
PROT UR QL STRIP: NEGATIVE
RBC # UR STRIP: ABNORMAL /HPF
REF LAB TEST METHOD: ABNORMAL
SODIUM SERPL-SCNC: 141 MMOL/L (ref 136–145)
SP GR UR STRIP: 1.01 (ref 1–1.03)
SQUAMOUS #/AREA URNS HPF: ABNORMAL /HPF
TRIGL SERPL-MCNC: 62 MG/DL (ref 0–150)
UROBILINOGEN UR QL STRIP: ABNORMAL
VIT B12 BLD-MCNC: >2000 PG/ML (ref 211–946)
VLDLC SERPL-MCNC: 12 MG/DL (ref 5–40)
WBC # UR STRIP: ABNORMAL /HPF

## 2024-06-07 PROCEDURE — 82607 VITAMIN B-12: CPT

## 2024-06-07 PROCEDURE — 82306 VITAMIN D 25 HYDROXY: CPT

## 2024-06-07 PROCEDURE — 81001 URINALYSIS AUTO W/SCOPE: CPT

## 2024-06-07 PROCEDURE — 80061 LIPID PANEL: CPT

## 2024-06-07 PROCEDURE — 36415 COLL VENOUS BLD VENIPUNCTURE: CPT

## 2024-06-07 PROCEDURE — 82728 ASSAY OF FERRITIN: CPT

## 2024-06-07 PROCEDURE — 80053 COMPREHEN METABOLIC PANEL: CPT

## 2024-06-30 DIAGNOSIS — M19.90 ARTHRITIS: ICD-10-CM

## 2024-08-22 ENCOUNTER — TELEPHONE (OUTPATIENT)
Dept: FAMILY MEDICINE CLINIC | Facility: CLINIC | Age: 69
End: 2024-08-22

## 2024-08-22 NOTE — TELEPHONE ENCOUNTER
Caller: Meliza Trivedi    Relationship to patient: Self    Best call back number: [phone]    Patient is needing: PATIENT CALLED STATING SHE HAD A FEW CLINICAL QUESTIONS REGARDING CHOLESTEROL MEDICATIONS THAT SHE WOULD LIKE TO SPEAK WITH SOMEONE ABOUT. CALLER DID NOT GIVE ANY OTHER DETAILS.

## 2024-08-22 NOTE — TELEPHONE ENCOUNTER
Pt just wanted to know if there was a preventative cholesterol medication or if it was just to manage cholesterol.  Let her know it is to manage high cholesterol.

## 2024-09-13 ENCOUNTER — HOSPITAL ENCOUNTER (OUTPATIENT)
Dept: BONE DENSITY | Facility: HOSPITAL | Age: 69
Discharge: HOME | End: 2024-09-13
Payer: MEDICARE

## 2024-09-13 DIAGNOSIS — Z78.0 POSTMENOPAUSAL: ICD-10-CM

## 2024-09-13 PROCEDURE — 77080 DXA BONE DENSITY AXIAL: CPT

## 2024-10-04 ENCOUNTER — OFFICE VISIT (OUTPATIENT)
Dept: FAMILY MEDICINE CLINIC | Facility: CLINIC | Age: 69
End: 2024-10-04
Payer: MEDICARE

## 2024-10-04 VITALS
OXYGEN SATURATION: 98 % | BODY MASS INDEX: 32.75 KG/M2 | HEIGHT: 70 IN | HEART RATE: 104 BPM | WEIGHT: 228.8 LBS | RESPIRATION RATE: 16 BRPM | DIASTOLIC BLOOD PRESSURE: 72 MMHG | SYSTOLIC BLOOD PRESSURE: 108 MMHG | TEMPERATURE: 97.4 F

## 2024-10-04 DIAGNOSIS — Z23 NEED FOR IMMUNIZATION AGAINST INFLUENZA: Primary | ICD-10-CM

## 2024-10-04 DIAGNOSIS — M19.90 ARTHRITIS: ICD-10-CM

## 2024-10-04 PROCEDURE — 1159F MED LIST DOCD IN RCRD: CPT | Performed by: NURSE PRACTITIONER

## 2024-10-04 PROCEDURE — 1126F AMNT PAIN NOTED NONE PRSNT: CPT | Performed by: NURSE PRACTITIONER

## 2024-10-04 PROCEDURE — 1160F RVW MEDS BY RX/DR IN RCRD: CPT | Performed by: NURSE PRACTITIONER

## 2024-10-04 PROCEDURE — 99213 OFFICE O/P EST LOW 20 MIN: CPT | Performed by: NURSE PRACTITIONER

## 2024-10-04 PROCEDURE — G0008 ADMIN INFLUENZA VIRUS VAC: HCPCS | Performed by: NURSE PRACTITIONER

## 2024-10-04 PROCEDURE — 90662 IIV NO PRSV INCREASED AG IM: CPT | Performed by: NURSE PRACTITIONER

## 2024-10-04 RX ORDER — AMOXICILLIN 500 MG/1
CAPSULE ORAL
COMMUNITY
Start: 2024-09-09 | End: 2024-10-04

## 2024-10-04 NOTE — PROGRESS NOTES
Answers submitted by the patient for this visit:  Other (Submitted on 9/28/2024)  Please describe your symptoms.: Regular visit that needed to be changed.  Have you had these symptoms before?: No  How long have you been having these symptoms?: Greater than 2 weeks  Please list any medications you are currently taking for this condition.: none  Please describe any probable cause for these symptoms. : none  Primary Reason for Visit (Submitted on 9/28/2024)  What is the primary reason for your visit?: Problem Not Listed  Chief Complaint  Obesity and Osteoarthritis    Minerva Roberts presents to [facility]  History of Present Illness  Arthritis:  flares up more than it used to.      The following portions of the patient's history were personally reviewed and updated as appropriate: allergies, current medications, past medical history, past surgical history, past family history, and past social history.     Body mass index is 32.83 kg/m².    BMI is >= 30 and <35. (Class 1 Obesity). The following options were offered after discussion;: weight loss educational material (shared in after visit summary)      Past History:    Medical History: has a past medical history of Aftercare following right knee joint replacement surgery (01/29/2018), Allergic conjunctivitis and rhinitis, Anemia, Arthritis, H/O Hepatitis, History of total knee arthroplasty, right (05/16/2018), Limb swelling, Lipoma of arm (06/15/2020), Seasonal allergies, and Sinus trouble.     Surgical History: has a past surgical history that includes Colonoscopy (06/2017); Breast mass excision (Right, 06/06/1997); Hysterectomy (2006); Anterior cruciate ligament repair (Bilateral, 1989/1991/2010); Tubal ligation (09/14/1993); Rotator cuff repair (Left, 01/27/2021); Replacement total knee (Right); Cyst Removal (Right, 03/14/2024); Joint replacement (2018); and Breast surgery (1996).     Family History: family  history includes Arthritis in her brother and mother; Cancer in her brother, mother, sister, and sister; Diabetes in her brother, father, and sister; Heart disease in her father, maternal grandmother, and mother; Hyperlipidemia in her sister; Multiple myeloma (age of onset: 23) in her brother; Stroke in her father; Vision loss in her father and sister.     Social History: reports that she has never smoked. She has never been exposed to tobacco smoke. She has never used smokeless tobacco. She reports that she does not drink alcohol and does not use drugs.    Allergies: Drug ingredient [cephalexin], Clindamycin/lincomycin, and Sulfa antibiotics          Current Outpatient Medications:     acetaminophen (TYLENOL) 650 MG 8 hr tablet, Tylenol Arthritis Pain 650 mg oral tablet extended release take 2 tablets by oral route every 8 hours   Active, Disp: , Rfl:     calcium carbonate-cholecalciferol 500-400 MG-UNIT tablet tablet, Take  by mouth Daily., Disp: , Rfl:     diclofenac (VOLTAREN) 50 MG EC tablet, Take 1 tablet by mouth 2 (Two) Times a Day., Disp: 180 tablet, Rfl: 1    Diclofenac Sodium (VOLTAREN) 1 % gel gel, Apply  topically to the appropriate area as directed 2 (Two) Times a Day., Disp: 450 g, Rfl: 1    ferrous sulfate 324 (65 Fe) MG tablet delayed-release EC tablet, Take 1 tablet by mouth Daily With Breakfast., Disp: , Rfl:     multivitamin with minerals tablet tablet, Take 1 tablet by mouth Daily., Disp: , Rfl:     vitamin B-12 (CYANOCOBALAMIN) 500 MCG tablet, Take 1 tablet by mouth Daily., Disp: , Rfl:     vitamin D3 125 MCG (5000 UT) capsule capsule, Take 1 capsule by mouth Daily., Disp: , Rfl:     Medications Discontinued During This Encounter   Medication Reason    triamcinolone (KENALOG) 0.1 % ointment *Therapy completed    amoxicillin (AMOXIL) 500 MG capsule *Therapy completed    methylPREDNISolone (MEDROL) 4 MG dose pack *Therapy completed    diclofenac (VOLTAREN) 50 MG EC tablet Reorder         Review  "of Systems   Constitutional:  Negative for fever.   Respiratory:  Negative for cough and shortness of breath.    Cardiovascular:  Negative for chest pain, palpitations and leg swelling.   Neurological:  Negative for dizziness, weakness, numbness and headache.        Objective         Vitals:    10/04/24 1312   BP: 108/72   BP Location: Right arm   Patient Position: Sitting   Cuff Size: Large Adult   Pulse: 104   Resp: 16   Temp: 97.4 °F (36.3 °C)   TempSrc: Temporal   SpO2: 98%   Weight: 104 kg (228 lb 12.8 oz)   Height: 177.8 cm (70\")     Body mass index is 32.83 kg/m².         Physical Exam  Vitals reviewed.   Constitutional:       Appearance: Normal appearance. She is well-developed.   HENT:      Head: Normocephalic and atraumatic.      Mouth/Throat:      Pharynx: No oropharyngeal exudate.   Eyes:      Conjunctiva/sclera: Conjunctivae normal.      Pupils: Pupils are equal, round, and reactive to light.   Cardiovascular:      Rate and Rhythm: Normal rate and regular rhythm.      Heart sounds: Normal heart sounds. No murmur heard.     No friction rub. No gallop.   Pulmonary:      Effort: Pulmonary effort is normal.      Breath sounds: Normal breath sounds. No wheezing or rhonchi.   Skin:     General: Skin is warm and dry.   Neurological:      Mental Status: She is alert and oriented to person, place, and time.   Psychiatric:         Mood and Affect: Mood and affect normal.         Behavior: Behavior normal.         Thought Content: Thought content normal.         Judgment: Judgment normal.             Result Review :               Assessment and Plan     Diagnoses and all orders for this visit:    1. Need for immunization against influenza (Primary)  -     Fluzone High-Dose 65+yrs ([id number])    2. Arthritis  -     diclofenac (VOLTAREN) 50 MG EC tablet; Take 1 tablet by mouth 2 (Two) Times a Day.  Dispense: 180 tablet; Refill: 1              Follow Up     Return in about 6 months (around 4/4/2025).    Patient was " given instructions and counseling regarding her condition or for health maintenance advice. Please see specific information pulled into the AVS if appropriate.

## 2024-10-10 DIAGNOSIS — M19.90 ARTHRITIS: ICD-10-CM

## 2024-10-11 NOTE — TELEPHONE ENCOUNTER
UPCOMING APPTS  With Family Medicine ([facility])  11/15/2024 at 7:00 AM  LAST OFFICE VISIT - THIS DEPT  10/4/2024 Bishnu Figueroa APRN

## 2024-11-15 ENCOUNTER — OFFICE VISIT (OUTPATIENT)
Dept: FAMILY MEDICINE CLINIC | Facility: CLINIC | Age: 69
End: 2024-11-15
Payer: MEDICARE

## 2024-11-15 VITALS
DIASTOLIC BLOOD PRESSURE: 86 MMHG | TEMPERATURE: 96.2 F | BODY MASS INDEX: 32.5 KG/M2 | WEIGHT: 227 LBS | SYSTOLIC BLOOD PRESSURE: 128 MMHG | RESPIRATION RATE: 18 BRPM | HEART RATE: 98 BPM | HEIGHT: 70 IN | OXYGEN SATURATION: 97 %

## 2024-11-15 DIAGNOSIS — Z00.00 MEDICARE ANNUAL WELLNESS VISIT, SUBSEQUENT: Primary | ICD-10-CM

## 2024-11-15 DIAGNOSIS — Z00.00 WELLNESS EXAMINATION: ICD-10-CM

## 2024-11-15 DIAGNOSIS — M19.90 ARTHRITIS: ICD-10-CM

## 2024-11-15 DIAGNOSIS — D50.9 IRON DEFICIENCY ANEMIA, UNSPECIFIED IRON DEFICIENCY ANEMIA TYPE: ICD-10-CM

## 2024-11-15 NOTE — PROGRESS NOTES
Subjective   The ABCs of the Annual Wellness Visit  Medicare Wellness Visit      Meliza Roberts is a 69 y.o. patient who presents for a Medicare Wellness Visit.    The following portions of the patient's history were reviewed and   updated as appropriate: allergies, current medications, past family history, past medical history, past social history, past surgical history, and problem list.    Compared to one year ago, the patient's physical   health is the same.  Compared to one year ago, the patient's mental   health is the same.    Recent Hospitalizations:  She was not admitted to the hospital during the last year.     Current Medical Providers:  Patient Care Team:  Bishnu Figueroa APRN as PCP - General (Nurse Practitioner)    Outpatient Medications Prior to Visit   Medication Sig Dispense Refill    acetaminophen (TYLENOL) 650 MG 8 hr tablet Tylenol Arthritis Pain 650 mg oral tablet extended release take 2 tablets by oral route every 8 hours   Active      calcium carbonate-cholecalciferol 500-400 MG-UNIT tablet tablet Take  by mouth Daily.      Diclofenac Sodium (VOLTAREN) 1 % gel gel Apply  topically to the appropriate area as directed 2 (Two) Times a Day. 450 g 1    ferrous sulfate 324 (65 Fe) MG tablet delayed-release EC tablet Take 1 tablet by mouth Daily With Breakfast.      multivitamin with minerals tablet tablet Take 1 tablet by mouth Daily.      vitamin B-12 (CYANOCOBALAMIN) 500 MCG tablet Take 1 tablet by mouth Daily.      vitamin D3 125 MCG (5000 UT) capsule capsule Take 1 capsule by mouth Daily.      diclofenac (VOLTAREN) 50 MG EC tablet Take 1 tablet by mouth 2 (Two) Times a Day. 180 tablet 1     No facility-administered medications prior to visit.     No opioid medication identified on active medication list. I have reviewed chart for other potential  high risk medication/s and harmful drug interactions in the elderly.      Aspirin is not on active medication list.  Aspirin use is not  "indicated based on review of current medical condition/s. Risk of harm outweighs potential benefits.  .    Patient Active Problem List   Diagnosis    Aftercare following surgery of the musculoskeletal system    Chronic left shoulder pain    Allergic condition    Anemia    Arthritis    History of total knee arthroplasty, right    Seasonal allergic rhinitis    Sinus trouble    Lipoma of arm    Vitamin D deficiency    B12 deficiency     Advance Care Planning Advance Directive is on file.  ACP discussion was held with the patient during this visit. Patient has an advance directive in EMR which is still valid.             Objective   Vitals:    11/15/24 0651   BP: 128/86   BP Location: Right arm   Patient Position: Sitting   Cuff Size: Adult   Pulse: 98   Resp: 18   Temp: 96.2 °F (35.7 °C)   TempSrc: Temporal   SpO2: 97%   Weight: 103 kg (227 lb)   Height: 177.8 cm (70\")   PainSc:   3   PainLoc: Generalized       Estimated body mass index is 32.57 kg/m² as calculated from the following:    Height as of this encounter: 177.8 cm (70\").    Weight as of this encounter: 103 kg (227 lb).            Does the patient have evidence of cognitive impairment? No                                                                                               Health  Risk Assessment    Smoking Status:  Social History     Tobacco Use   Smoking Status Never    Passive exposure: Never   Smokeless Tobacco Never     Alcohol Consumption:  Social History     Substance and Sexual Activity   Alcohol Use Never    Comment: does not drink       Fall Risk Screen  STEADI Fall Risk Assessment was completed, and patient is at LOW risk for falls.Assessment completed on:11/15/2024    Depression Screening   Little interest or pleasure in doing things? Not at all   Feeling down, depressed, or hopeless? Not at all   PHQ-2 Total Score 0      Health Habits and Functional and Cognitive Screenin/14/2024     8:19 PM   Functional & Cognitive Status   Do " you have difficulty preparing food and eating? No    Do you have difficulty bathing yourself, getting dressed or grooming yourself? No    Do you have difficulty using the toilet? No    Do you have difficulty moving around from place to place? No    Do you have trouble with steps or getting out of a bed or a chair? Yes    Current Diet Other    Dental Exam Up to date    Eye Exam Up to date    Exercise (times per week) 0 times per week    Current Exercises Include House Cleaning    Do you need help using the phone?  No    Are you deaf or do you have serious difficulty hearing?  No    Do you need help to go to places out of walking distance? No    Do you need help shopping? No    Do you need help preparing meals?  No    Do you need help with housework?  No    Do you need help with laundry? No    Do you need help taking your medications? No    Do you need help managing money? No    Do you ever drive or ride in a car without wearing a seat belt? No    Have you felt unusual stress, anger or loneliness in the last month? No    Who do you live with? Sibling    If you need help, do you have trouble finding someone available to you? No    Have you been bothered in the last four weeks by sexual problems? No    Do you have difficulty concentrating, remembering or making decisions? No        Patient-reported           Age-appropriate Screening Schedule:  Refer to the list below for future screening recommendations based on patient's age, sex and/or medical conditions. Orders for these recommended tests are listed in the plan section. The patient has been provided with a written plan.    Health Maintenance List  Health Maintenance   Topic Date Due    Pneumococcal Vaccine 65+ (1 of 1 - PCV) 02/19/2025 (Originally 8/25/2022)    BMI FOLLOWUP  10/04/2025    ANNUAL WELLNESS VISIT  11/15/2025    MAMMOGRAM  03/01/2026    DXA SCAN  09/13/2026    COLORECTAL CANCER SCREENING  07/13/2027    TDAP/TD VACCINES (2 - Td or Tdap) 10/09/2029     HEPATITIS C SCREENING  Completed    COVID-19 Vaccine  Completed    INFLUENZA VACCINE  Completed    ZOSTER VACCINE  Completed                                                                                                                                                CMS Preventative Services Quick Reference  Risk Factors Identified During Encounter  None Identified Gait is steady in the office not needing to walk with assistance.    The above risks/problems have been discussed with the patient.  Pertinent information has been shared with the patient in the After Visit Summary.  An After Visit Summary and PPPS were made available to the patient.    Follow Up:   Next Medicare Wellness visit to be scheduled in 1 year.     Assessment & Plan  Arthritis    Orders:    diclofenac (VOLTAREN) 50 MG EC tablet; Take 1 tablet by mouth 2 (Two) Times a Day.    Medicare annual wellness visit, subsequent         Iron deficiency anemia, unspecified iron deficiency anemia type         Wellness examination    Orders:    Comprehensive Metabolic Panel; Future    Lipid Panel With / Chol / HDL Ratio; Future    Urinalysis With Culture If Indicated -; Future    CBC & Differential; Future    TSH Rfx On Abnormal To Free T4; Future         Follow Up:   No follow-ups on file.

## 2024-12-02 DIAGNOSIS — Z12.31 ENCOUNTER FOR SCREENING MAMMOGRAM FOR MALIGNANT NEOPLASM OF BREAST: Primary | ICD-10-CM

## 2025-02-17 ENCOUNTER — HOSPITAL ENCOUNTER (OUTPATIENT)
Dept: GENERAL RADIOLOGY | Facility: HOSPITAL | Age: 70
Discharge: HOME | End: 2025-02-17
Admitting: NURSE PRACTITIONER
Payer: MEDICARE

## 2025-02-17 ENCOUNTER — OFFICE VISIT (OUTPATIENT)
Dept: FAMILY MEDICINE CLINIC | Facility: CLINIC | Age: 70
End: 2025-02-17
Payer: MEDICARE

## 2025-02-17 VITALS
TEMPERATURE: 97.3 F | BODY MASS INDEX: 33.7 KG/M2 | RESPIRATION RATE: 18 BRPM | HEIGHT: 70 IN | HEART RATE: 105 BPM | DIASTOLIC BLOOD PRESSURE: 84 MMHG | WEIGHT: 235.4 LBS | OXYGEN SATURATION: 97 % | SYSTOLIC BLOOD PRESSURE: 126 MMHG

## 2025-02-17 DIAGNOSIS — M25.551 RIGHT HIP PAIN: ICD-10-CM

## 2025-02-17 DIAGNOSIS — M25.551 RIGHT HIP PAIN: Primary | ICD-10-CM

## 2025-02-17 PROCEDURE — 1159F MED LIST DOCD IN RCRD: CPT | Performed by: NURSE PRACTITIONER

## 2025-02-17 PROCEDURE — 1160F RVW MEDS BY RX/DR IN RCRD: CPT | Performed by: NURSE PRACTITIONER

## 2025-02-17 PROCEDURE — 99213 OFFICE O/P EST LOW 20 MIN: CPT | Performed by: NURSE PRACTITIONER

## 2025-02-17 PROCEDURE — 1125F AMNT PAIN NOTED PAIN PRSNT: CPT | Performed by: NURSE PRACTITIONER

## 2025-02-17 PROCEDURE — 73502 X-RAY EXAM HIP UNI 2-3 VIEWS: CPT

## 2025-02-17 NOTE — PROGRESS NOTES
Chief Complaint  Hip Pain (Right - started last Sat stood up and turned and it popped, has hurt ever since.  )    Minerva Roberts presents to [facility]  History of Present Illness  Hip pain on the right. Saturday:stood up and turned and it popped and has hurt ever since. Doesn't hurt unless moving.  States that is fine if sitting still.  Took stom diclofenac and ices and pain did ease up.  This happened 8 days ago.     Hip Pain   Pertinent negatives include no numbness.     Symptoms include: joint pain and joint swelling.   Pertinent negative symptoms include no chest pain, no cough, no fever, no numbness and no weakness.   Treatment and/or Medications comments include: Tylenol arthritis  Diclofenac   Additional information: ice packs      The following portions of the patient's history were personally reviewed and updated as appropriate: allergies, current medications, past medical history, past surgical history, past family history, and past social history.     Body mass index is 33.78 kg/m².           Past History:    Medical History: has a past medical history of Aftercare following right knee joint replacement surgery (01/29/2018), Allergic conjunctivitis and rhinitis, Anemia, Arthritis, H/O Hepatitis, History of total knee arthroplasty, right (05/16/2018), Limb swelling, Lipoma of arm (06/15/2020), Seasonal allergies, and Sinus trouble.     Surgical History: has a past surgical history that includes Colonoscopy (06/2017); Breast mass excision (Right, 06/06/1997); Hysterectomy (2006); Anterior cruciate ligament repair (Bilateral, 1989/1991/2010); Tubal ligation (09/14/1993); Rotator cuff repair (Left, 01/27/2021); Replacement total knee (Right); Cyst Removal (Right, 03/14/2024); Joint replacement (2018); and Breast surgery (1996).     Family History: family history includes Arthritis in her brother and mother; Cancer in her brother, mother, sister, and  sister; Diabetes in her brother, brother, brother, father, and sister; Early death in her brother; Heart disease in her father, maternal grandmother, and mother; Hyperlipidemia in her brother and sister; Multiple myeloma (age of onset: 23) in her brother; Stroke in her father; Vision loss in her father and sister.     Social History: reports that she has never smoked. She has never been exposed to tobacco smoke. She has never used smokeless tobacco. She reports that she does not drink alcohol and does not use drugs.    Allergies: Drug ingredient [cephalexin], Clindamycin/lincomycin, and Sulfa antibiotics          Current Outpatient Medications:     acetaminophen (TYLENOL) 650 MG 8 hr tablet, Tylenol Arthritis Pain 650 mg oral tablet extended release take 2 tablets by oral route every 8 hours   Active, Disp: , Rfl:     calcium carbonate-cholecalciferol 500-400 MG-UNIT tablet tablet, Take  by mouth Daily., Disp: , Rfl:     diclofenac (VOLTAREN) 50 MG EC tablet, Take 1 tablet by mouth 2 (Two) Times a Day., Disp: 180 tablet, Rfl: 1    Diclofenac Sodium (VOLTAREN) 1 % gel gel, Apply  topically to the appropriate area as directed 2 (Two) Times a Day., Disp: 450 g, Rfl: 1    ferrous sulfate 324 (65 Fe) MG tablet delayed-release EC tablet, Take 1 tablet by mouth Daily With Breakfast., Disp: , Rfl:     multivitamin with minerals tablet tablet, Take 1 tablet by mouth Daily., Disp: , Rfl:     vitamin B-12 (CYANOCOBALAMIN) 500 MCG tablet, Take 1 tablet by mouth Daily., Disp: , Rfl:     vitamin D3 125 MCG (5000 UT) capsule capsule, Take 1 capsule by mouth Daily., Disp: , Rfl:     There are no discontinued medications.      Review of Systems   Constitutional:  Negative for fever.   Respiratory:  Negative for cough and shortness of breath.    Cardiovascular:  Negative for chest pain, palpitations and leg swelling.   Musculoskeletal:  Positive for joint pain.   Neurological:  Negative for dizziness, weakness, numbness and headache.  "       Objective         Vitals:    02/17/25 1412   BP: 126/84   Pulse: 105   Resp: 18   Temp: 97.3 °F (36.3 °C)   TempSrc: Temporal   SpO2: 97%   Weight: 107 kg (235 lb 6.4 oz)   Height: 177.8 cm (70\")     Body mass index is 33.78 kg/m².         Physical Exam  Vitals reviewed.   Constitutional:       Appearance: Normal appearance. She is well-developed.   HENT:      Head: Normocephalic and atraumatic.      Mouth/Throat:      Pharynx: No oropharyngeal exudate.   Eyes:      Conjunctiva/sclera: Conjunctivae normal.      Pupils: Pupils are equal, round, and reactive to light.   Cardiovascular:      Rate and Rhythm: Normal rate and regular rhythm.      Heart sounds: Normal heart sounds. No murmur heard.     No friction rub. No gallop.   Pulmonary:      Effort: Pulmonary effort is normal.      Breath sounds: Normal breath sounds. No wheezing or rhonchi.   Musculoskeletal:        Legs:       Comments: Tender to palpation.   Skin:     General: Skin is warm and dry.   Neurological:      Mental Status: She is alert and oriented to person, place, and time.   Psychiatric:         Mood and Affect: Mood and affect normal.         Behavior: Behavior normal.         Thought Content: Thought content normal.         Judgment: Judgment normal.             Result Review :               Assessment and Plan     Diagnoses and all orders for this visit:    1. Right hip pain (Primary)  -     XR Hip With or Without Pelvis 2 - 3 View Left; Future              Follow Up     Return for Next scheduled follow up.    Patient was given instructions and counseling regarding her condition or for health maintenance advice. Please see specific information pulled into the AVS if appropriate.         "

## 2025-02-20 DIAGNOSIS — M25.551 RIGHT HIP PAIN: Primary | ICD-10-CM

## 2025-03-07 ENCOUNTER — HOSPITAL ENCOUNTER (OUTPATIENT)
Dept: MAMMOGRAPHY | Facility: HOSPITAL | Age: 70
Discharge: HOME | End: 2025-03-07
Admitting: NURSE PRACTITIONER
Payer: MEDICARE

## 2025-03-07 DIAGNOSIS — Z12.31 ENCOUNTER FOR SCREENING MAMMOGRAM FOR MALIGNANT NEOPLASM OF BREAST: ICD-10-CM

## 2025-03-07 PROCEDURE — 77063 BREAST TOMOSYNTHESIS BI: CPT

## 2025-03-07 PROCEDURE — 77067 SCR MAMMO BI INCL CAD: CPT

## 2025-03-20 ENCOUNTER — LAB (OUTPATIENT)
Dept: LAB | Facility: HOSPITAL | Age: 70
End: 2025-03-20
Payer: MEDICARE

## 2025-03-20 ENCOUNTER — TELEPHONE (OUTPATIENT)
Dept: FAMILY MEDICINE CLINIC | Facility: CLINIC | Age: 70
End: 2025-03-20
Payer: MEDICARE

## 2025-03-20 DIAGNOSIS — Z00.00 WELLNESS EXAMINATION: ICD-10-CM

## 2025-03-20 LAB
ALBUMIN SERPL-MCNC: 4 G/DL (ref 3.5–5.2)
ALBUMIN/GLOB SERPL: 1.2 G/DL
ALP SERPL-CCNC: 87 U/L (ref 39–117)
ALT SERPL W P-5'-P-CCNC: 29 U/L (ref 1–33)
ANION GAP SERPL CALCULATED.3IONS-SCNC: 8.6 MMOL/L (ref 5–15)
AST SERPL-CCNC: 21 U/L (ref 1–32)
BACTERIA UR QL AUTO: ABNORMAL /HPF
BASOPHILS # BLD AUTO: 0.02 10*3/MM3 (ref 0–0.2)
BASOPHILS NFR BLD AUTO: 0.3 % (ref 0–1.5)
BILIRUB SERPL-MCNC: 0.3 MG/DL (ref 0–1.2)
BILIRUB UR QL STRIP: NEGATIVE
BUN SERPL-MCNC: 15 MG/DL (ref 8–23)
BUN/CREAT SERPL: 19.5 (ref 7–25)
CALCIUM SPEC-SCNC: 9.3 MG/DL (ref 8.6–10.5)
CHLORIDE SERPL-SCNC: 104 MMOL/L (ref 98–107)
CHOLEST SERPL-MCNC: 164 MG/DL (ref 0–200)
CLARITY UR: ABNORMAL
CO2 SERPL-SCNC: 26.4 MMOL/L (ref 22–29)
COLOR UR: YELLOW
CREAT SERPL-MCNC: 0.77 MG/DL (ref 0.57–1)
DEPRECATED RDW RBC AUTO: 39.6 FL (ref 37–54)
EGFRCR SERPLBLD CKD-EPI 2021: 84.7 ML/MIN/1.73
EOSINOPHIL # BLD AUTO: 0.33 10*3/MM3 (ref 0–0.4)
EOSINOPHIL NFR BLD AUTO: 5.1 % (ref 0.3–6.2)
ERYTHROCYTE [DISTWIDTH] IN BLOOD BY AUTOMATED COUNT: 14.1 % (ref 12.3–15.4)
GLOBULIN UR ELPH-MCNC: 3.4 GM/DL
GLUCOSE SERPL-MCNC: 91 MG/DL (ref 65–99)
GLUCOSE UR STRIP-MCNC: NEGATIVE MG/DL
HCT VFR BLD AUTO: 41.5 % (ref 34–46.6)
HDLC SERPL QL: 3.42
HDLC SERPL-MCNC: 48 MG/DL (ref 40–60)
HGB BLD-MCNC: 11.9 G/DL (ref 12–15.9)
HGB UR QL STRIP.AUTO: ABNORMAL
HOLD SPECIMEN: NORMAL
HYALINE CASTS UR QL AUTO: ABNORMAL /LPF
IMM GRANULOCYTES # BLD AUTO: 0.01 10*3/MM3 (ref 0–0.05)
IMM GRANULOCYTES NFR BLD AUTO: 0.2 % (ref 0–0.5)
KETONES UR QL STRIP: NEGATIVE
LDLC SERPL CALC-MCNC: 102 MG/DL (ref 0–100)
LEUKOCYTE ESTERASE UR QL STRIP.AUTO: ABNORMAL
LYMPHOCYTES # BLD AUTO: 2.67 10*3/MM3 (ref 0.7–3.1)
LYMPHOCYTES NFR BLD AUTO: 41.5 % (ref 19.6–45.3)
MCH RBC QN AUTO: 22.2 PG (ref 26.6–33)
MCHC RBC AUTO-ENTMCNC: 28.7 G/DL (ref 31.5–35.7)
MCV RBC AUTO: 77.3 FL (ref 79–97)
MONOCYTES # BLD AUTO: 0.52 10*3/MM3 (ref 0.1–0.9)
MONOCYTES NFR BLD AUTO: 8.1 % (ref 5–12)
NEUTROPHILS NFR BLD AUTO: 2.88 10*3/MM3 (ref 1.7–7)
NEUTROPHILS NFR BLD AUTO: 44.8 % (ref 42.7–76)
NITRITE UR QL STRIP: POSITIVE
NRBC BLD AUTO-RTO: 0 /100 WBC (ref 0–0.2)
PH UR STRIP.AUTO: 6 [PH] (ref 5–8)
PLATELET # BLD AUTO: 305 10*3/MM3 (ref 140–450)
PMV BLD AUTO: 10.7 FL (ref 6–12)
POTASSIUM SERPL-SCNC: 3.9 MMOL/L (ref 3.5–5.2)
PROT SERPL-MCNC: 7.4 G/DL (ref 6–8.5)
PROT UR QL STRIP: NEGATIVE
RBC # BLD AUTO: 5.37 10*6/MM3 (ref 3.77–5.28)
RBC # UR STRIP: ABNORMAL /HPF
REF LAB TEST METHOD: ABNORMAL
SODIUM SERPL-SCNC: 139 MMOL/L (ref 136–145)
SP GR UR STRIP: 1.02 (ref 1–1.03)
SQUAMOUS #/AREA URNS HPF: ABNORMAL /HPF
TRIGL SERPL-MCNC: 74 MG/DL (ref 0–150)
TSH SERPL DL<=0.05 MIU/L-ACNC: 1.83 UIU/ML (ref 0.27–4.2)
UROBILINOGEN UR QL STRIP: ABNORMAL
VLDLC SERPL-MCNC: 14 MG/DL (ref 5–40)
WBC # UR STRIP: ABNORMAL /HPF
WBC NRBC COR # BLD AUTO: 6.43 10*3/MM3 (ref 3.4–10.8)

## 2025-03-20 PROCEDURE — 87077 CULTURE AEROBIC IDENTIFY: CPT

## 2025-03-20 PROCEDURE — 87086 URINE CULTURE/COLONY COUNT: CPT

## 2025-03-20 PROCEDURE — 87186 SC STD MICRODIL/AGAR DIL: CPT

## 2025-03-20 PROCEDURE — 85025 COMPLETE CBC W/AUTO DIFF WBC: CPT

## 2025-03-20 PROCEDURE — 81001 URINALYSIS AUTO W/SCOPE: CPT

## 2025-03-20 PROCEDURE — 36415 COLL VENOUS BLD VENIPUNCTURE: CPT

## 2025-03-20 PROCEDURE — 80053 COMPREHEN METABOLIC PANEL: CPT

## 2025-03-20 PROCEDURE — 80061 LIPID PANEL: CPT

## 2025-03-20 PROCEDURE — 84443 ASSAY THYROID STIM HORMONE: CPT

## 2025-03-20 NOTE — TELEPHONE ENCOUNTER
Caller: [facility]   Relationship: Lab   Best call back number:   Who are you requesting to speak with (clinical staff, provider, specific staff member): MA or Provider     What was the call regarding: Patient went to lab to get blood drawn but order date was too far out, advised lab we would change order date to allow the labs to be drawn prior to Meliza's appointment with us on 4/4.    pelvic pain

## 2025-03-22 LAB — BACTERIA SPEC AEROBE CULT: ABNORMAL

## 2025-04-04 ENCOUNTER — OFFICE VISIT (OUTPATIENT)
Dept: FAMILY MEDICINE CLINIC | Facility: CLINIC | Age: 70
End: 2025-04-04
Payer: MEDICARE

## 2025-04-04 VITALS
WEIGHT: 235.6 LBS | HEIGHT: 70 IN | HEART RATE: 86 BPM | DIASTOLIC BLOOD PRESSURE: 70 MMHG | RESPIRATION RATE: 16 BRPM | TEMPERATURE: 97.6 F | BODY MASS INDEX: 33.73 KG/M2 | SYSTOLIC BLOOD PRESSURE: 122 MMHG | OXYGEN SATURATION: 98 %

## 2025-04-04 DIAGNOSIS — M19.90 ARTHRITIS: ICD-10-CM

## 2025-04-04 DIAGNOSIS — Z00.00 MEDICARE ANNUAL WELLNESS VISIT, SUBSEQUENT: Primary | ICD-10-CM

## 2025-04-04 NOTE — PROGRESS NOTES
Subjective   The ABCs of the Annual Wellness Visit  Medicare Wellness Visit      Meliza Roberts is a 69 y.o. patient who presents for a Medicare Wellness Visit.    The following portions of the patient's history were reviewed and   updated as appropriate: allergies, current medications, past family history, past medical history, past social history, past surgical history, and problem list.    Compared to one year ago, the patient's physical   health is the same.  Compared to one year ago, the patient's mental   health is the same.    Recent Hospitalizations:  She was not admitted to the hospital during the last year.     Current Medical Providers:  Patient Care Team:  Bishnu Figueroa APRN as PCP - General (Nurse Practitioner)    Outpatient Medications Prior to Visit   Medication Sig Dispense Refill    acetaminophen (TYLENOL) 650 MG 8 hr tablet Tylenol Arthritis Pain 650 mg oral tablet extended release take 2 tablets by oral route every 8 hours   Active      calcium carbonate-cholecalciferol 500-400 MG-UNIT tablet tablet Take  by mouth Daily.      diclofenac (VOLTAREN) 50 MG EC tablet Take 1 tablet by mouth 2 (Two) Times a Day. 180 tablet 1    Diclofenac Sodium (VOLTAREN) 1 % gel gel Apply  topically to the appropriate area as directed 2 (Two) Times a Day. 450 g 1    ferrous sulfate 324 (65 Fe) MG tablet delayed-release EC tablet Take 1 tablet by mouth Daily With Breakfast.      multivitamin with minerals tablet tablet Take 1 tablet by mouth Daily.      vitamin B-12 (CYANOCOBALAMIN) 500 MCG tablet Take 1 tablet by mouth Daily.      vitamin D3 125 MCG (5000 UT) capsule capsule Take 1 capsule by mouth Daily.       No facility-administered medications prior to visit.     No opioid medication identified on active medication list. I have reviewed chart for other potential  high risk medication/s and harmful drug interactions in the elderly.      Aspirin is not on active medication list.  Aspirin use is not  "indicated based on review of current medical condition/s. Risk of harm outweighs potential benefits.  .    Patient Active Problem List   Diagnosis    Aftercare following surgery of the musculoskeletal system    Chronic left shoulder pain    Allergic condition    Anemia    Arthritis    History of total knee arthroplasty, right    Seasonal allergic rhinitis    Sinus trouble    Lipoma of arm    Vitamin D deficiency    B12 deficiency     Advance Care Planning Advance Directive is on file.  ACP discussion was held with the patient during this visit. Patient has an advance directive in EMR which is still valid.             Objective   Vitals:    04/04/25 0731   BP: 122/70   BP Location: Right arm   Patient Position: Sitting   Cuff Size: Large Adult   Pulse: 86   Resp: 16   Temp: 97.6 °F (36.4 °C)   TempSrc: Temporal   SpO2: 98%   Weight: 107 kg (235 lb 9.6 oz)   Height: 177.8 cm (70\")   PainSc: 0-No pain       Estimated body mass index is 33.81 kg/m² as calculated from the following:    Height as of this encounter: 177.8 cm (70\").    Weight as of this encounter: 107 kg (235 lb 9.6 oz).         Gait and Balance Evaluation:  Normal        Does the patient have evidence of cognitive impairment? No  Lab Results   Component Value Date    TRIG 74 03/20/2025    HDL 48 03/20/2025     (H) 03/20/2025    VLDL 14 03/20/2025                                                                                                Health  Risk Assessment    Smoking Status:  Social History     Tobacco Use   Smoking Status Never    Passive exposure: Never   Smokeless Tobacco Never     Alcohol Consumption:  Social History     Substance and Sexual Activity   Alcohol Use Never    Comment: does not drink       Fall Risk Screen  STEADI Fall Risk Assessment was completed, and patient is at LOW risk for falls.Assessment completed on:4/4/2025    Depression Screening   Little interest or pleasure in doing things? Not at all   Feeling down, depressed, " or hopeless? Not at all   PHQ-2 Total Score 0      Health Habits and Functional and Cognitive Screening:      3/30/2025     8:12 PM   Functional & Cognitive Status   Do you have difficulty preparing food and eating? No   Do you have difficulty bathing yourself, getting dressed or grooming yourself? No   Do you have difficulty using the toilet? No   Do you have difficulty moving around from place to place? No   Do you have trouble with steps or getting out of a bed or a chair? No   Current Diet Other   Dental Exam Other   Eye Exam Up to date   Exercise (times per week) 1 times per week   Current Exercises Include Walking   Do you need help using the phone?  No   Are you deaf or do you have serious difficulty hearing?  No   Do you need help to go to places out of walking distance? No   Do you need help shopping? No   Do you need help preparing meals?  No   Do you need help with housework?  No   Do you need help with laundry? No   Do you need help taking your medications? No   Do you need help managing money? No   Do you ever drive or ride in a car without wearing a seat belt? No   Have you felt unusual stress, anger or loneliness in the last month? No   Who do you live with? Sibling   If you need help, do you have trouble finding someone available to you? No   Have you been bothered in the last four weeks by sexual problems? No   Do you have difficulty concentrating, remembering or making decisions? No           Age-appropriate Screening Schedule:  Refer to the list below for future screening recommendations based on patient's age, sex and/or medical conditions. Orders for these recommended tests are listed in the plan section. The patient has been provided with a written plan.    Health Maintenance List  Health Maintenance   Topic Date Due    Pneumococcal Vaccine 50+ (1 of 1 - PCV) 04/04/2026 (Originally 8/25/2007)    COVID-19 Vaccine (8 - 2024-25 season) 04/25/2025    INFLUENZA VACCINE  07/01/2025    ANNUAL WELLNESS  "VISIT  04/04/2026    DXA SCAN  09/13/2026    MAMMOGRAM  03/07/2027    COLORECTAL CANCER SCREENING  07/13/2027    TDAP/TD VACCINES (2 - Td or Tdap) 10/09/2029    HEPATITIS C SCREENING  Completed    ZOSTER VACCINE  Completed                                                                                                                                                CMS Preventative Services Quick Reference  Risk Factors Identified During Encounter  None Identified    The above risks/problems have been discussed with the patient.  Pertinent information has been shared with the patient in the After Visit Summary.  An After Visit Summary and PPPS were made available to the patient.    Follow Up:   Next Medicare Wellness visit to be scheduled in 1 year.         Additional E&M Note during same encounter follows:  Patient has additional, significant, and separately identifiable condition(s)/problem(s) that require work above and beyond the Medicare Wellness Visit     Chief Complaint  Medicare Wellness-subsequent and Arthritis    Subjective   Arthritis:  doing well for the most part.  And doing well with diclofenac.    Arthritis  Pertinent negatives include no fever.         Review of Systems   Constitutional:  Negative for fever.   Respiratory:  Negative for cough, chest tightness and shortness of breath.    Cardiovascular:  Negative for chest pain.   Musculoskeletal:  Positive for arthritis.              Objective   Vital Signs:  /70 (BP Location: Right arm, Patient Position: Sitting, Cuff Size: Large Adult)   Pulse 86   Temp 97.6 °F (36.4 °C) (Temporal)   Resp 16   Ht 177.8 cm (70\")   Wt 107 kg (235 lb 9.6 oz)   SpO2 98%   BMI 33.81 kg/m²   Physical Exam  Vitals reviewed.   Constitutional:       Appearance: Normal appearance. She is well-developed.   HENT:      Head: Normocephalic and atraumatic.      Mouth/Throat:      Pharynx: No oropharyngeal exudate.   Eyes:      Conjunctiva/sclera: Conjunctivae normal. "      Pupils: Pupils are equal, round, and reactive to light.   Cardiovascular:      Rate and Rhythm: Normal rate and regular rhythm.      Heart sounds: Normal heart sounds. No murmur heard.     No friction rub. No gallop.   Pulmonary:      Effort: Pulmonary effort is normal.      Breath sounds: Normal breath sounds. No wheezing or rhonchi.   Skin:     General: Skin is warm and dry.   Neurological:      Mental Status: She is alert and oriented to person, place, and time.   Psychiatric:         Mood and Affect: Mood and affect normal.         Behavior: Behavior normal.         Thought Content: Thought content normal.         Judgment: Judgment normal.                    Assessment and Plan      Medicare annual wellness visit, subsequent         Arthritis                 Follow Up   Return in about 6 months (around 10/4/2025).  Patient was given instructions and counseling regarding her condition or for health maintenance advice. Please see specific information pulled into the AVS if appropriate.

## (undated) DEVICE — MAJOR-LF: Brand: MEDLINE INDUSTRIES, INC.

## (undated) DEVICE — SUT MNCRYL 4/0 PS2 18 IN

## (undated) DEVICE — SLV SCD KN/LEN ADJ EXPRSS BLENDED MD 1P/U

## (undated) DEVICE — PENCL E/S SMOKEEVAC W/TELESCP CANN

## (undated) DEVICE — ADHS SKIN PREMIERPRO EXOFIN TOPICAL HI/VISC .5ML

## (undated) DEVICE — SUT VIC 3/0 SH 27IN J416H

## (undated) DEVICE — GLV SURG BIOGEL LTX PF 7 1/2

## (undated) DEVICE — GOWN,REINFORCE,POLY,SIRUS,BREATH SLV,XLG: Brand: MEDLINE

## (undated) DEVICE — INTENDED FOR TISSUE SEPARATION, AND OTHER PROCEDURES THAT REQUIRE A SHARP SURGICAL BLADE TO PUNCTURE OR CUT.: Brand: BARD-PARKER ® CARBON RIB-BACK BLADES

## (undated) DEVICE — ELECTRD BLD EDGE COAT 3IN